# Patient Record
Sex: FEMALE | Race: WHITE | Employment: UNEMPLOYED | URBAN - METROPOLITAN AREA
[De-identification: names, ages, dates, MRNs, and addresses within clinical notes are randomized per-mention and may not be internally consistent; named-entity substitution may affect disease eponyms.]

---

## 2017-02-14 ENCOUNTER — HOSPITAL ENCOUNTER (OUTPATIENT)
Dept: OTHER | Age: 77
Discharge: HOME | End: 2017-02-14
Attending: INTERNAL MEDICINE | Admitting: INTERNAL MEDICINE

## 2017-02-14 DIAGNOSIS — I10 ESSENTIAL HYPERTENSION: ICD-10-CM

## 2017-02-14 LAB
A/G RATIO: 1.2 (ref 1.1–2.2)
ALBUMIN SERPL-MCNC: 3.9 G/DL (ref 3.4–5)
ALP BLD-CCNC: 80 U/L (ref 40–129)
ALT SERPL-CCNC: <5 U/L (ref 10–40)
ANION GAP SERPL CALCULATED.3IONS-SCNC: 14 MMOL/L (ref 3–16)
AST SERPL-CCNC: 11 U/L (ref 15–37)
BASOPHILS ABSOLUTE: 0 K/UL (ref 0–0.2)
BASOPHILS RELATIVE PERCENT: 0.9 %
BILIRUB SERPL-MCNC: 0.9 MG/DL (ref 0–1)
BUN BLDV-MCNC: 9 MG/DL (ref 7–20)
CALCIUM SERPL-MCNC: 9.3 MG/DL (ref 8.3–10.6)
CHLORIDE BLD-SCNC: 104 MMOL/L (ref 99–110)
CHOLESTEROL, TOTAL: 199 MG/DL (ref 0–199)
CO2: 26 MMOL/L (ref 21–32)
CREAT SERPL-MCNC: 1.2 MG/DL (ref 0.6–1.2)
EOSINOPHILS ABSOLUTE: 0.2 K/UL (ref 0–0.6)
EOSINOPHILS RELATIVE PERCENT: 3 %
GFR AFRICAN AMERICAN: 53
GFR NON-AFRICAN AMERICAN: 44
GLOBULIN: 3.2 G/DL
GLUCOSE BLD-MCNC: 106 MG/DL (ref 70–99)
HCT VFR BLD CALC: 34.1 % (ref 36–48)
HDLC SERPL-MCNC: 68 MG/DL (ref 40–60)
HEMOGLOBIN: 11.3 G/DL (ref 12–16)
LDL CHOLESTEROL CALCULATED: 114 MG/DL
LYMPHOCYTES ABSOLUTE: 2.1 K/UL (ref 1–5.1)
LYMPHOCYTES RELATIVE PERCENT: 38 %
MCH RBC QN AUTO: 27.8 PG (ref 26–34)
MCHC RBC AUTO-ENTMCNC: 33.1 G/DL (ref 31–36)
MCV RBC AUTO: 83.8 FL (ref 80–100)
MONOCYTES ABSOLUTE: 0.5 K/UL (ref 0–1.3)
MONOCYTES RELATIVE PERCENT: 8.4 %
NEUTROPHILS ABSOLUTE: 2.8 K/UL (ref 1.7–7.7)
NEUTROPHILS RELATIVE PERCENT: 49.7 %
PDW BLD-RTO: 13.6 % (ref 12.4–15.4)
PLATELET # BLD: 190 K/UL (ref 135–450)
PMV BLD AUTO: 8.4 FL (ref 5–10.5)
POTASSIUM SERPL-SCNC: 4.9 MMOL/L (ref 3.5–5.1)
RBC # BLD: 4.07 M/UL (ref 4–5.2)
SODIUM BLD-SCNC: 144 MMOL/L (ref 136–145)
TOTAL PROTEIN: 7.1 G/DL (ref 6.4–8.2)
TRIGL SERPL-MCNC: 83 MG/DL (ref 0–150)
VLDLC SERPL CALC-MCNC: 17 MG/DL
WBC # BLD: 5.6 K/UL (ref 4–11)

## 2017-05-23 PROBLEM — M17.0 PRIMARY OSTEOARTHRITIS OF BOTH KNEES: Status: ACTIVE | Noted: 2017-05-23

## 2018-06-04 RX ORDER — LISINOPRIL 10 MG/1
10 TABLET ORAL DAILY
Qty: 90 TABLET | Refills: 0 | Status: SHIPPED | OUTPATIENT
Start: 2018-06-04 | End: 2018-08-20 | Stop reason: SDUPTHER

## 2018-08-11 ENCOUNTER — HOSPITAL ENCOUNTER (OUTPATIENT)
Dept: OTHER | Age: 78
Discharge: HOME | End: 2018-08-11
Attending: INTERNAL MEDICINE | Admitting: INTERNAL MEDICINE

## 2018-08-11 DIAGNOSIS — Z00.00 MEDICARE ANNUAL WELLNESS VISIT, SUBSEQUENT: ICD-10-CM

## 2018-08-11 LAB
ANION GAP SERPL CALCULATED.3IONS-SCNC: 12 MMOL/L (ref 3–16)
BUN BLDV-MCNC: 8 MG/DL (ref 7–20)
CALCIUM SERPL-MCNC: 9.5 MG/DL (ref 8.3–10.6)
CHLORIDE BLD-SCNC: 101 MMOL/L (ref 99–110)
CO2: 27 MMOL/L (ref 21–32)
CREAT SERPL-MCNC: 1 MG/DL (ref 0.6–1.2)
GFR AFRICAN AMERICAN: >60
GFR NON-AFRICAN AMERICAN: 54
GLUCOSE BLD-MCNC: 106 MG/DL (ref 70–99)
HCT VFR BLD CALC: 33.9 % (ref 36–48)
HEMOGLOBIN: 11.3 G/DL (ref 12–16)
MCH RBC QN AUTO: 28.1 PG (ref 26–34)
MCHC RBC AUTO-ENTMCNC: 33.5 G/DL (ref 31–36)
MCV RBC AUTO: 83.9 FL (ref 80–100)
PDW BLD-RTO: 14.6 % (ref 12.4–15.4)
PLATELET # BLD: 191 K/UL (ref 135–450)
PMV BLD AUTO: 8 FL (ref 5–10.5)
POTASSIUM SERPL-SCNC: 4.1 MMOL/L (ref 3.5–5.1)
RBC # BLD: 4.04 M/UL (ref 4–5.2)
SODIUM BLD-SCNC: 140 MMOL/L (ref 136–145)
WBC # BLD: 3.9 K/UL (ref 4–11)

## 2019-03-16 ENCOUNTER — HOSPITAL ENCOUNTER (INPATIENT)
Age: 79
LOS: 4 days | Discharge: HOME | DRG: 638 | End: 2019-03-20
Attending: INTERNAL MEDICINE | Admitting: INTERNAL MEDICINE
Payer: MEDICARE

## 2019-03-16 ENCOUNTER — HOSPITAL ENCOUNTER (OUTPATIENT)
Age: 79
Discharge: HOME | DRG: 638 | End: 2019-03-16
Payer: MEDICARE

## 2019-03-16 DIAGNOSIS — E86.0 DEHYDRATION: ICD-10-CM

## 2019-03-16 DIAGNOSIS — E10.65 UNCONTROLLED TYPE 1 DIABETES MELLITUS WITH HYPERGLYCEMIA (HCC): Primary | ICD-10-CM

## 2019-03-16 DIAGNOSIS — M62.838 MUSCLE SPASM: ICD-10-CM

## 2019-03-16 PROBLEM — N17.9 ACUTE RENAL FAILURE (ARF) (HCC): Status: ACTIVE | Noted: 2019-03-16

## 2019-03-16 LAB
A/G RATIO: 1.2 (ref 1.1–2.2)
ALBUMIN SERPL-MCNC: 4.5 G/DL (ref 3.4–5)
ALP BLD-CCNC: 113 U/L (ref 40–129)
ALT SERPL-CCNC: 10 U/L (ref 10–40)
ANION GAP SERPL CALCULATED.3IONS-SCNC: 16 MMOL/L (ref 3–16)
ANION GAP SERPL CALCULATED.3IONS-SCNC: 27 MMOL/L (ref 3–16)
ANION GAP SERPL CALCULATED.3IONS-SCNC: 29 MMOL/L (ref 3–16)
AST SERPL-CCNC: 14 U/L (ref 15–37)
BASOPHILS ABSOLUTE: 0 K/UL (ref 0–0.2)
BASOPHILS RELATIVE PERCENT: 0.4 %
BILIRUB SERPL-MCNC: 1.1 MG/DL (ref 0–1)
BUN BLDV-MCNC: 23 MG/DL (ref 7–20)
BUN BLDV-MCNC: 28 MG/DL (ref 7–20)
BUN BLDV-MCNC: 29 MG/DL (ref 7–20)
CALCIUM SERPL-MCNC: 10.4 MG/DL (ref 8.3–10.6)
CALCIUM SERPL-MCNC: 8.2 MG/DL (ref 8.3–10.6)
CALCIUM SERPL-MCNC: 9.7 MG/DL (ref 8.3–10.6)
CHLORIDE BLD-SCNC: 100 MMOL/L (ref 99–110)
CHLORIDE BLD-SCNC: 80 MMOL/L (ref 99–110)
CHLORIDE BLD-SCNC: 80 MMOL/L (ref 99–110)
CO2: 17 MMOL/L (ref 21–32)
CO2: 18 MMOL/L (ref 21–32)
CO2: 21 MMOL/L (ref 21–32)
CREAT SERPL-MCNC: 1.2 MG/DL (ref 0.6–1.2)
CREAT SERPL-MCNC: 1.5 MG/DL (ref 0.6–1.2)
CREAT SERPL-MCNC: 1.8 MG/DL (ref 0.6–1.2)
EOSINOPHILS ABSOLUTE: 0 K/UL (ref 0–0.6)
EOSINOPHILS RELATIVE PERCENT: 0.4 %
GFR AFRICAN AMERICAN: 33
GFR AFRICAN AMERICAN: 40
GFR AFRICAN AMERICAN: 52
GFR NON-AFRICAN AMERICAN: 27
GFR NON-AFRICAN AMERICAN: 33
GFR NON-AFRICAN AMERICAN: 43
GLOBULIN: 3.7 G/DL
GLUCOSE BLD-MCNC: 151 MG/DL (ref 70–99)
GLUCOSE BLD-MCNC: 162 MG/DL (ref 70–99)
GLUCOSE BLD-MCNC: 182 MG/DL (ref 70–99)
GLUCOSE BLD-MCNC: 312 MG/DL (ref 70–99)
GLUCOSE BLD-MCNC: 373 MG/DL (ref 70–99)
GLUCOSE BLD-MCNC: 665 MG/DL (ref 70–99)
GLUCOSE BLD-MCNC: 719 MG/DL (ref 70–99)
GLUCOSE BLD-MCNC: >600 MG/DL (ref 70–99)
HCT VFR BLD CALC: 41.9 % (ref 36–48)
HCT VFR BLD CALC: 41.9 % (ref 36–48)
HEMOGLOBIN: 13.7 G/DL (ref 12–16)
HEMOGLOBIN: 13.7 G/DL (ref 12–16)
LYMPHOCYTES ABSOLUTE: 1.7 K/UL (ref 1–5.1)
LYMPHOCYTES RELATIVE PERCENT: 29.6 %
MAGNESIUM: 2.1 MG/DL (ref 1.8–2.4)
MAGNESIUM: 2.3 MG/DL (ref 1.8–2.4)
MAGNESIUM: 2.5 MG/DL (ref 1.8–2.4)
MCH RBC QN AUTO: 28 PG (ref 26–34)
MCH RBC QN AUTO: 28.1 PG (ref 26–34)
MCHC RBC AUTO-ENTMCNC: 32.6 G/DL (ref 31–36)
MCHC RBC AUTO-ENTMCNC: 32.8 G/DL (ref 31–36)
MCV RBC AUTO: 85.6 FL (ref 80–100)
MCV RBC AUTO: 85.8 FL (ref 80–100)
MONOCYTES ABSOLUTE: 0.6 K/UL (ref 0–1.3)
MONOCYTES RELATIVE PERCENT: 10.2 %
NEUTROPHILS ABSOLUTE: 3.5 K/UL (ref 1.7–7.7)
NEUTROPHILS RELATIVE PERCENT: 59.4 %
PDW BLD-RTO: 15.1 % (ref 12.4–15.4)
PDW BLD-RTO: 15.4 % (ref 12.4–15.4)
PERFORMED ON: ABNORMAL
PHOSPHORUS: 1.8 MG/DL (ref 2.5–4.9)
PHOSPHORUS: 3.9 MG/DL (ref 2.5–4.9)
PLATELET # BLD: 205 K/UL (ref 135–450)
PLATELET # BLD: 218 K/UL (ref 135–450)
PMV BLD AUTO: 10.5 FL (ref 5–10.5)
PMV BLD AUTO: 11 FL (ref 5–10.5)
POTASSIUM REFLEX MAGNESIUM: 5.9 MMOL/L (ref 3.5–5.1)
POTASSIUM SERPL-SCNC: 4.4 MMOL/L (ref 3.5–5.1)
POTASSIUM SERPL-SCNC: 5.6 MMOL/L (ref 3.5–5.1)
RBC # BLD: 4.88 M/UL (ref 4–5.2)
RBC # BLD: 4.9 M/UL (ref 4–5.2)
SODIUM BLD-SCNC: 125 MMOL/L (ref 136–145)
SODIUM BLD-SCNC: 126 MMOL/L (ref 136–145)
SODIUM BLD-SCNC: 137 MMOL/L (ref 136–145)
TOTAL PROTEIN: 8.2 G/DL (ref 6.4–8.2)
WBC # BLD: 5.9 K/UL (ref 4–11)
WBC # BLD: 6.4 K/UL (ref 4–11)

## 2019-03-16 PROCEDURE — 6360000002 HC RX W HCPCS: Performed by: FAMILY MEDICINE

## 2019-03-16 PROCEDURE — 2580000003 HC RX 258: Performed by: FAMILY MEDICINE

## 2019-03-16 PROCEDURE — 36415 COLL VENOUS BLD VENIPUNCTURE: CPT

## 2019-03-16 PROCEDURE — 80053 COMPREHEN METABOLIC PANEL: CPT

## 2019-03-16 PROCEDURE — 83036 HEMOGLOBIN GLYCOSYLATED A1C: CPT

## 2019-03-16 PROCEDURE — 2500000003 HC RX 250 WO HCPCS: Performed by: FAMILY MEDICINE

## 2019-03-16 PROCEDURE — 83735 ASSAY OF MAGNESIUM: CPT

## 2019-03-16 PROCEDURE — 84100 ASSAY OF PHOSPHORUS: CPT

## 2019-03-16 PROCEDURE — 85025 COMPLETE CBC W/AUTO DIFF WBC: CPT

## 2019-03-16 PROCEDURE — 6370000000 HC RX 637 (ALT 250 FOR IP): Performed by: INTERNAL MEDICINE

## 2019-03-16 PROCEDURE — 2580000003 HC RX 258: Performed by: INTERNAL MEDICINE

## 2019-03-16 PROCEDURE — 6370000000 HC RX 637 (ALT 250 FOR IP): Performed by: FAMILY MEDICINE

## 2019-03-16 PROCEDURE — 2000000000 HC ICU R&B

## 2019-03-16 PROCEDURE — 2580000003 HC RX 258

## 2019-03-16 PROCEDURE — 85027 COMPLETE CBC AUTOMATED: CPT

## 2019-03-16 RX ORDER — SODIUM CHLORIDE 9 MG/ML
INJECTION, SOLUTION INTRAVENOUS CONTINUOUS
Status: DISCONTINUED | OUTPATIENT
Start: 2019-03-16 | End: 2019-03-18

## 2019-03-16 RX ORDER — DEXTROSE AND SODIUM CHLORIDE 5; .45 G/100ML; G/100ML
INJECTION, SOLUTION INTRAVENOUS CONTINUOUS PRN
Status: DISCONTINUED | OUTPATIENT
Start: 2019-03-16 | End: 2019-03-19

## 2019-03-16 RX ORDER — 0.9 % SODIUM CHLORIDE 0.9 %
15 INTRAVENOUS SOLUTION INTRAVENOUS ONCE
Status: COMPLETED | OUTPATIENT
Start: 2019-03-16 | End: 2019-03-16

## 2019-03-16 RX ORDER — MAGNESIUM SULFATE 1 G/100ML
1 INJECTION INTRAVENOUS PRN
Status: DISCONTINUED | OUTPATIENT
Start: 2019-03-16 | End: 2019-03-18

## 2019-03-16 RX ORDER — 0.9 % SODIUM CHLORIDE 0.9 %
2000 INTRAVENOUS SOLUTION INTRAVENOUS ONCE
Status: COMPLETED | OUTPATIENT
Start: 2019-03-16 | End: 2019-03-16

## 2019-03-16 RX ORDER — SODIUM CHLORIDE 9 MG/ML
INJECTION, SOLUTION INTRAVENOUS
Status: COMPLETED
Start: 2019-03-16 | End: 2019-03-16

## 2019-03-16 RX ORDER — POTASSIUM CHLORIDE 7.45 MG/ML
10 INJECTION INTRAVENOUS PRN
Status: DISCONTINUED | OUTPATIENT
Start: 2019-03-16 | End: 2019-03-18

## 2019-03-16 RX ORDER — DEXTROSE MONOHYDRATE 25 G/50ML
12.5 INJECTION, SOLUTION INTRAVENOUS PRN
Status: DISCONTINUED | OUTPATIENT
Start: 2019-03-16 | End: 2019-03-19 | Stop reason: SDUPTHER

## 2019-03-16 RX ADMIN — SODIUM CHLORIDE 614 ML: 9 INJECTION, SOLUTION INTRAVENOUS at 19:57

## 2019-03-16 RX ADMIN — INSULIN HUMAN 10 UNITS: 100 INJECTION, SOLUTION PARENTERAL at 18:56

## 2019-03-16 RX ADMIN — ENOXAPARIN SODIUM 30 MG: 30 INJECTION SUBCUTANEOUS at 22:32

## 2019-03-16 RX ADMIN — SODIUM PHOSPHATE, MONOBASIC, MONOHYDRATE 15 MMOL: 276; 142 INJECTION, SOLUTION INTRAVENOUS at 23:34

## 2019-03-16 RX ADMIN — SODIUM CHLORIDE: 9 INJECTION, SOLUTION INTRAVENOUS at 21:03

## 2019-03-16 RX ADMIN — SODIUM CHLORIDE 2000 ML: 9 INJECTION, SOLUTION INTRAVENOUS at 18:58

## 2019-03-16 RX ADMIN — DEXTROSE AND SODIUM CHLORIDE: 5; 450 INJECTION, SOLUTION INTRAVENOUS at 22:44

## 2019-03-16 RX ADMIN — POTASSIUM CHLORIDE 10 MEQ: 7.46 INJECTION, SOLUTION INTRAVENOUS at 23:27

## 2019-03-16 RX ADMIN — SODIUM CHLORIDE 1000 ML: 9 INJECTION, SOLUTION INTRAVENOUS at 18:57

## 2019-03-16 RX ADMIN — SODIUM CHLORIDE 0.1 UNITS/KG/HR: 9 INJECTION, SOLUTION INTRAVENOUS at 19:51

## 2019-03-17 LAB
ANION GAP SERPL CALCULATED.3IONS-SCNC: 10 MMOL/L (ref 3–16)
ANION GAP SERPL CALCULATED.3IONS-SCNC: 12 MMOL/L (ref 3–16)
ANION GAP SERPL CALCULATED.3IONS-SCNC: 9 MMOL/L (ref 3–16)
BILIRUBIN URINE: NEGATIVE
BLOOD, URINE: ABNORMAL
BUN BLDV-MCNC: 12 MG/DL (ref 7–20)
BUN BLDV-MCNC: 15 MG/DL (ref 7–20)
BUN BLDV-MCNC: 19 MG/DL (ref 7–20)
CALCIUM SERPL-MCNC: 7.6 MG/DL (ref 8.3–10.6)
CALCIUM SERPL-MCNC: 7.7 MG/DL (ref 8.3–10.6)
CALCIUM SERPL-MCNC: 7.8 MG/DL (ref 8.3–10.6)
CHLORIDE BLD-SCNC: 101 MMOL/L (ref 99–110)
CHLORIDE BLD-SCNC: 102 MMOL/L (ref 99–110)
CHLORIDE BLD-SCNC: 102 MMOL/L (ref 99–110)
CLARITY: ABNORMAL
CO2: 22 MMOL/L (ref 21–32)
CO2: 22 MMOL/L (ref 21–32)
CO2: 24 MMOL/L (ref 21–32)
COLOR: YELLOW
CREAT SERPL-MCNC: 0.9 MG/DL (ref 0.6–1.2)
CREAT SERPL-MCNC: 0.9 MG/DL (ref 0.6–1.2)
CREAT SERPL-MCNC: 1 MG/DL (ref 0.6–1.2)
EPITHELIAL CELLS, UA: 1 /HPF (ref 0–5)
GFR AFRICAN AMERICAN: >60
GFR NON-AFRICAN AMERICAN: 53
GFR NON-AFRICAN AMERICAN: >60
GFR NON-AFRICAN AMERICAN: >60
GLUCOSE BLD-MCNC: 135 MG/DL (ref 70–99)
GLUCOSE BLD-MCNC: 137 MG/DL (ref 70–99)
GLUCOSE BLD-MCNC: 146 MG/DL (ref 70–99)
GLUCOSE BLD-MCNC: 148 MG/DL (ref 70–99)
GLUCOSE BLD-MCNC: 159 MG/DL (ref 70–99)
GLUCOSE BLD-MCNC: 165 MG/DL (ref 70–99)
GLUCOSE BLD-MCNC: 185 MG/DL (ref 70–99)
GLUCOSE BLD-MCNC: 188 MG/DL (ref 70–99)
GLUCOSE BLD-MCNC: 189 MG/DL (ref 70–99)
GLUCOSE BLD-MCNC: 195 MG/DL (ref 70–99)
GLUCOSE BLD-MCNC: 195 MG/DL (ref 70–99)
GLUCOSE BLD-MCNC: 204 MG/DL (ref 70–99)
GLUCOSE BLD-MCNC: 230 MG/DL (ref 70–99)
GLUCOSE BLD-MCNC: 236 MG/DL (ref 70–99)
GLUCOSE BLD-MCNC: 237 MG/DL (ref 70–99)
GLUCOSE URINE: >=1000 MG/DL
HYALINE CASTS: 2 /LPF (ref 0–8)
KETONES, URINE: 40 MG/DL
LEUKOCYTE ESTERASE, URINE: ABNORMAL
MAGNESIUM: 1.7 MG/DL (ref 1.8–2.4)
MAGNESIUM: 1.8 MG/DL (ref 1.8–2.4)
MAGNESIUM: 3 MG/DL (ref 1.8–2.4)
MICROSCOPIC EXAMINATION: YES
NITRITE, URINE: NEGATIVE
PERFORMED ON: ABNORMAL
PH UA: 5.5 (ref 5–8)
PHOSPHORUS: 1.7 MG/DL (ref 2.5–4.9)
PHOSPHORUS: 2.4 MG/DL (ref 2.5–4.9)
PHOSPHORUS: 3.5 MG/DL (ref 2.5–4.9)
POTASSIUM SERPL-SCNC: 3.7 MMOL/L (ref 3.5–5.1)
POTASSIUM SERPL-SCNC: 4.1 MMOL/L (ref 3.5–5.1)
POTASSIUM SERPL-SCNC: 4.2 MMOL/L (ref 3.5–5.1)
PROTEIN UA: NEGATIVE MG/DL
RBC UA: 2 /HPF (ref 0–4)
SODIUM BLD-SCNC: 133 MMOL/L (ref 136–145)
SODIUM BLD-SCNC: 135 MMOL/L (ref 136–145)
SODIUM BLD-SCNC: 136 MMOL/L (ref 136–145)
SPECIFIC GRAVITY UA: 1.02 (ref 1–1.03)
URINE TYPE: ABNORMAL
UROBILINOGEN, URINE: 1 E.U./DL
WBC UA: 15 /HPF (ref 0–5)

## 2019-03-17 PROCEDURE — 36415 COLL VENOUS BLD VENIPUNCTURE: CPT

## 2019-03-17 PROCEDURE — 81001 URINALYSIS AUTO W/SCOPE: CPT

## 2019-03-17 PROCEDURE — 2000000000 HC ICU R&B

## 2019-03-17 PROCEDURE — 80048 BASIC METABOLIC PNL TOTAL CA: CPT

## 2019-03-17 PROCEDURE — 2580000003 HC RX 258: Performed by: FAMILY MEDICINE

## 2019-03-17 PROCEDURE — 6360000002 HC RX W HCPCS: Performed by: INTERNAL MEDICINE

## 2019-03-17 PROCEDURE — 6370000000 HC RX 637 (ALT 250 FOR IP): Performed by: INTERNAL MEDICINE

## 2019-03-17 PROCEDURE — 1200000000 HC SEMI PRIVATE

## 2019-03-17 PROCEDURE — 84100 ASSAY OF PHOSPHORUS: CPT

## 2019-03-17 PROCEDURE — 83036 HEMOGLOBIN GLYCOSYLATED A1C: CPT

## 2019-03-17 PROCEDURE — 83735 ASSAY OF MAGNESIUM: CPT

## 2019-03-17 PROCEDURE — 6360000002 HC RX W HCPCS: Performed by: FAMILY MEDICINE

## 2019-03-17 RX ADMIN — POTASSIUM CHLORIDE 10 MEQ: 7.46 INJECTION, SOLUTION INTRAVENOUS at 04:20

## 2019-03-17 RX ADMIN — POTASSIUM CHLORIDE 10 MEQ: 7.46 INJECTION, SOLUTION INTRAVENOUS at 00:45

## 2019-03-17 RX ADMIN — POTASSIUM CHLORIDE 10 MEQ: 7.46 INJECTION, SOLUTION INTRAVENOUS at 09:55

## 2019-03-17 RX ADMIN — INSULIN LISPRO 1 UNITS: 100 INJECTION, SOLUTION INTRAVENOUS; SUBCUTANEOUS at 21:15

## 2019-03-17 RX ADMIN — ENOXAPARIN SODIUM 40 MG: 40 INJECTION SUBCUTANEOUS at 21:15

## 2019-03-17 RX ADMIN — INSULIN GLARGINE 10 UNITS: 100 INJECTION, SOLUTION SUBCUTANEOUS at 09:56

## 2019-03-17 RX ADMIN — DEXTROSE AND SODIUM CHLORIDE: 5; 450 INJECTION, SOLUTION INTRAVENOUS at 05:29

## 2019-03-17 RX ADMIN — POTASSIUM CHLORIDE 10 MEQ: 7.46 INJECTION, SOLUTION INTRAVENOUS at 06:16

## 2019-03-17 RX ADMIN — INSULIN LISPRO 2 UNITS: 100 INJECTION, SOLUTION INTRAVENOUS; SUBCUTANEOUS at 14:36

## 2019-03-18 ENCOUNTER — APPOINTMENT (OUTPATIENT)
Dept: CT IMAGING | Age: 79
DRG: 638 | End: 2019-03-18
Attending: INTERNAL MEDICINE
Payer: MEDICARE

## 2019-03-18 LAB
ANION GAP SERPL CALCULATED.3IONS-SCNC: 8 MMOL/L (ref 3–16)
BUN BLDV-MCNC: 9 MG/DL (ref 7–20)
CALCIUM SERPL-MCNC: 8.2 MG/DL (ref 8.3–10.6)
CHLORIDE BLD-SCNC: 106 MMOL/L (ref 99–110)
CO2: 24 MMOL/L (ref 21–32)
CREAT SERPL-MCNC: 0.8 MG/DL (ref 0.6–1.2)
ESTIMATED AVERAGE GLUCOSE: 378.1 MG/DL
ESTIMATED AVERAGE GLUCOSE: 380.9 MG/DL
GFR AFRICAN AMERICAN: >60
GFR NON-AFRICAN AMERICAN: >60
GLUCOSE BLD-MCNC: 118 MG/DL (ref 70–99)
GLUCOSE BLD-MCNC: 126 MG/DL (ref 70–99)
GLUCOSE BLD-MCNC: 135 MG/DL (ref 70–99)
GLUCOSE BLD-MCNC: 144 MG/DL (ref 70–99)
GLUCOSE BLD-MCNC: 145 MG/DL (ref 70–99)
HBA1C MFR BLD: 14.8 %
HBA1C MFR BLD: 14.9 %
MAGNESIUM: 1.9 MG/DL (ref 1.8–2.4)
PERFORMED ON: ABNORMAL
PHOSPHORUS: 2 MG/DL (ref 2.5–4.9)
POTASSIUM SERPL-SCNC: 4.1 MMOL/L (ref 3.5–5.1)
SODIUM BLD-SCNC: 138 MMOL/L (ref 136–145)

## 2019-03-18 PROCEDURE — 92526 ORAL FUNCTION THERAPY: CPT

## 2019-03-18 PROCEDURE — 1200000000 HC SEMI PRIVATE

## 2019-03-18 PROCEDURE — 36415 COLL VENOUS BLD VENIPUNCTURE: CPT

## 2019-03-18 PROCEDURE — 84100 ASSAY OF PHOSPHORUS: CPT

## 2019-03-18 PROCEDURE — 74176 CT ABD & PELVIS W/O CONTRAST: CPT

## 2019-03-18 PROCEDURE — 6370000000 HC RX 637 (ALT 250 FOR IP): Performed by: INTERNAL MEDICINE

## 2019-03-18 PROCEDURE — 92610 EVALUATE SWALLOWING FUNCTION: CPT

## 2019-03-18 PROCEDURE — 83735 ASSAY OF MAGNESIUM: CPT

## 2019-03-18 PROCEDURE — 80048 BASIC METABOLIC PNL TOTAL CA: CPT

## 2019-03-18 RX ORDER — LISINOPRIL 5 MG/1
5 TABLET ORAL DAILY
Status: DISCONTINUED | OUTPATIENT
Start: 2019-03-18 | End: 2019-03-20 | Stop reason: HOSPADM

## 2019-03-18 RX ADMIN — INSULIN GLARGINE 10 UNITS: 100 INJECTION, SOLUTION SUBCUTANEOUS at 08:12

## 2019-03-18 RX ADMIN — INSULIN LISPRO 1 UNITS: 100 INJECTION, SOLUTION INTRAVENOUS; SUBCUTANEOUS at 17:03

## 2019-03-18 RX ADMIN — LISINOPRIL 5 MG: 5 TABLET ORAL at 12:01

## 2019-03-18 ASSESSMENT — PAIN SCALES - GENERAL: PAINLEVEL_OUTOF10: 0

## 2019-03-19 PROBLEM — E10.10 DIABETIC KETOACIDOSIS WITHOUT COMA ASSOCIATED WITH TYPE 1 DIABETES MELLITUS (HCC): Status: ACTIVE | Noted: 2019-03-19

## 2019-03-19 PROBLEM — E10.65 UNCONTROLLED TYPE 1 DIABETES MELLITUS WITH HYPERGLYCEMIA (HCC): Status: ACTIVE | Noted: 2019-03-19

## 2019-03-19 LAB
ANION GAP SERPL CALCULATED.3IONS-SCNC: 9 MMOL/L (ref 3–16)
BUN BLDV-MCNC: 9 MG/DL (ref 7–20)
CALCIUM SERPL-MCNC: 8.4 MG/DL (ref 8.3–10.6)
CHLORIDE BLD-SCNC: 106 MMOL/L (ref 99–110)
CO2: 26 MMOL/L (ref 21–32)
CORTISOL TOTAL: 8.5 UG/DL
CREAT SERPL-MCNC: 0.8 MG/DL (ref 0.6–1.2)
FOLLICLE STIMULATING HORMONE: 58.4 MIU/ML
GFR AFRICAN AMERICAN: >60
GFR NON-AFRICAN AMERICAN: >60
GLUCOSE BLD-MCNC: 130 MG/DL (ref 70–99)
GLUCOSE BLD-MCNC: 190 MG/DL (ref 70–99)
GLUCOSE BLD-MCNC: 246 MG/DL (ref 70–99)
GLUCOSE BLD-MCNC: 87 MG/DL (ref 70–99)
GLUCOSE BLD-MCNC: 89 MG/DL (ref 70–99)
LUTEINIZING HORMONE: 29.3 MIU/ML
MAGNESIUM: 1.9 MG/DL (ref 1.8–2.4)
PERFORMED ON: ABNORMAL
PERFORMED ON: NORMAL
PHOSPHORUS: 2.5 MG/DL (ref 2.5–4.9)
POTASSIUM SERPL-SCNC: 3.8 MMOL/L (ref 3.5–5.1)
SODIUM BLD-SCNC: 141 MMOL/L (ref 136–145)
T4 FREE: 1.2 NG/DL (ref 0.9–1.8)
TSH SERPL DL<=0.05 MIU/L-ACNC: 3.32 UIU/ML (ref 0.27–4.2)

## 2019-03-19 PROCEDURE — 83001 ASSAY OF GONADOTROPIN (FSH): CPT

## 2019-03-19 PROCEDURE — 84443 ASSAY THYROID STIM HORMONE: CPT

## 2019-03-19 PROCEDURE — 84100 ASSAY OF PHOSPHORUS: CPT

## 2019-03-19 PROCEDURE — 82533 TOTAL CORTISOL: CPT

## 2019-03-19 PROCEDURE — 1200000000 HC SEMI PRIVATE

## 2019-03-19 PROCEDURE — 83735 ASSAY OF MAGNESIUM: CPT

## 2019-03-19 PROCEDURE — 36415 COLL VENOUS BLD VENIPUNCTURE: CPT

## 2019-03-19 PROCEDURE — 80048 BASIC METABOLIC PNL TOTAL CA: CPT

## 2019-03-19 PROCEDURE — 84305 ASSAY OF SOMATOMEDIN: CPT

## 2019-03-19 PROCEDURE — 6370000000 HC RX 637 (ALT 250 FOR IP): Performed by: INTERNAL MEDICINE

## 2019-03-19 PROCEDURE — 83002 ASSAY OF GONADOTROPIN (LH): CPT

## 2019-03-19 PROCEDURE — 84439 ASSAY OF FREE THYROXINE: CPT

## 2019-03-19 RX ORDER — NICOTINE POLACRILEX 4 MG
15 LOZENGE BUCCAL PRN
Status: DISCONTINUED | OUTPATIENT
Start: 2019-03-19 | End: 2019-03-20 | Stop reason: HOSPADM

## 2019-03-19 RX ORDER — DEXTROSE MONOHYDRATE 50 MG/ML
100 INJECTION, SOLUTION INTRAVENOUS PRN
Status: DISCONTINUED | OUTPATIENT
Start: 2019-03-19 | End: 2019-03-20 | Stop reason: HOSPADM

## 2019-03-19 RX ORDER — DEXTROSE MONOHYDRATE 25 G/50ML
12.5 INJECTION, SOLUTION INTRAVENOUS PRN
Status: DISCONTINUED | OUTPATIENT
Start: 2019-03-19 | End: 2019-03-20 | Stop reason: HOSPADM

## 2019-03-19 RX ADMIN — INSULIN LISPRO 1 UNITS: 100 INJECTION, SOLUTION INTRAVENOUS; SUBCUTANEOUS at 19:30

## 2019-03-19 RX ADMIN — INSULIN LISPRO 1 UNITS: 100 INJECTION, SOLUTION INTRAVENOUS; SUBCUTANEOUS at 22:12

## 2019-03-19 RX ADMIN — INSULIN GLARGINE 10 UNITS: 100 INJECTION, SOLUTION SUBCUTANEOUS at 10:05

## 2019-03-19 RX ADMIN — LISINOPRIL 5 MG: 5 TABLET ORAL at 09:52

## 2019-03-19 ASSESSMENT — PAIN SCALES - GENERAL
PAINLEVEL_OUTOF10: 0

## 2019-03-20 VITALS
HEIGHT: 65 IN | WEIGHT: 95.02 LBS | OXYGEN SATURATION: 100 % | TEMPERATURE: 97.8 F | HEART RATE: 70 BPM | RESPIRATION RATE: 16 BRPM | DIASTOLIC BLOOD PRESSURE: 78 MMHG | SYSTOLIC BLOOD PRESSURE: 138 MMHG | BODY MASS INDEX: 15.83 KG/M2

## 2019-03-20 LAB
ANION GAP SERPL CALCULATED.3IONS-SCNC: 9 MMOL/L (ref 3–16)
BUN BLDV-MCNC: 9 MG/DL (ref 7–20)
CALCIUM SERPL-MCNC: 8.6 MG/DL (ref 8.3–10.6)
CHLORIDE BLD-SCNC: 106 MMOL/L (ref 99–110)
CO2: 27 MMOL/L (ref 21–32)
CREAT SERPL-MCNC: 0.9 MG/DL (ref 0.6–1.2)
GFR AFRICAN AMERICAN: >60
GFR NON-AFRICAN AMERICAN: >60
GLUCOSE BLD-MCNC: 119 MG/DL (ref 70–99)
GLUCOSE BLD-MCNC: 166 MG/DL (ref 70–99)
GLUCOSE BLD-MCNC: 238 MG/DL (ref 70–99)
GLUCOSE BLD-MCNC: 71 MG/DL (ref 70–99)
IGF-1 (INSULIN-LIKE GROWTH I): 33 NG/ML (ref 18–206)
INSULIN-LIKE GROWTH FACTOR-1 Z-SCORE: -2
MAGNESIUM: 1.8 MG/DL (ref 1.8–2.4)
PERFORMED ON: ABNORMAL
PERFORMED ON: ABNORMAL
PERFORMED ON: NORMAL
PHOSPHORUS: 3.2 MG/DL (ref 2.5–4.9)
POTASSIUM SERPL-SCNC: 3.7 MMOL/L (ref 3.5–5.1)
SODIUM BLD-SCNC: 142 MMOL/L (ref 136–145)

## 2019-03-20 PROCEDURE — 84100 ASSAY OF PHOSPHORUS: CPT

## 2019-03-20 PROCEDURE — 6370000000 HC RX 637 (ALT 250 FOR IP): Performed by: INTERNAL MEDICINE

## 2019-03-20 PROCEDURE — 80048 BASIC METABOLIC PNL TOTAL CA: CPT

## 2019-03-20 PROCEDURE — 36415 COLL VENOUS BLD VENIPUNCTURE: CPT

## 2019-03-20 PROCEDURE — 83735 ASSAY OF MAGNESIUM: CPT

## 2019-03-20 RX ORDER — LANCETS 30 GAUGE
1 EACH MISCELLANEOUS 4 TIMES DAILY
Qty: 200 EACH | Refills: 0 | Status: SHIPPED | OUTPATIENT
Start: 2019-03-20 | End: 2020-06-23 | Stop reason: SDUPTHER

## 2019-03-20 RX ORDER — MAGNESIUM OXIDE 400 MG/1
400 TABLET ORAL DAILY
Qty: 30 TABLET | Refills: 1 | Status: SHIPPED | OUTPATIENT
Start: 2019-03-20 | End: 2019-04-19

## 2019-03-20 RX ORDER — LISINOPRIL 5 MG/1
5 TABLET ORAL DAILY
Qty: 30 TABLET | Refills: 3 | Status: SHIPPED | OUTPATIENT
Start: 2019-03-21 | End: 2019-05-23 | Stop reason: SDUPTHER

## 2019-03-20 RX ADMIN — INSULIN LISPRO 1 UNITS: 100 INJECTION, SOLUTION INTRAVENOUS; SUBCUTANEOUS at 08:48

## 2019-03-20 RX ADMIN — LISINOPRIL 5 MG: 5 TABLET ORAL at 08:45

## 2019-03-20 RX ADMIN — INSULIN LISPRO 2 UNITS: 100 INJECTION, SOLUTION INTRAVENOUS; SUBCUTANEOUS at 11:52

## 2019-03-20 RX ADMIN — INSULIN GLARGINE 10 UNITS: 100 INJECTION, SOLUTION SUBCUTANEOUS at 08:47

## 2019-03-20 ASSESSMENT — PAIN SCALES - GENERAL
PAINLEVEL_OUTOF10: 0

## 2019-09-17 ENCOUNTER — HOSPITAL ENCOUNTER (OUTPATIENT)
Age: 79
Discharge: HOME | End: 2019-09-17
Payer: MEDICARE

## 2019-09-17 LAB
CREATININE URINE: 81.5 MG/DL (ref 28–259)
MICROALBUMIN UR-MCNC: <1.2 MG/DL
MICROALBUMIN/CREAT UR-RTO: NORMAL MG/G (ref 0–30)

## 2019-09-17 PROCEDURE — 82570 ASSAY OF URINE CREATININE: CPT

## 2019-09-17 PROCEDURE — 82043 UR ALBUMIN QUANTITATIVE: CPT

## 2019-10-07 ENCOUNTER — HOSPITAL ENCOUNTER (OUTPATIENT)
Age: 79
Discharge: HOME | End: 2019-10-07
Payer: MEDICARE

## 2019-10-07 ENCOUNTER — OFFICE VISIT (OUTPATIENT)
Dept: ENDOCRINOLOGY | Age: 79
End: 2019-10-07
Payer: MEDICARE

## 2019-10-07 VITALS
WEIGHT: 88.4 LBS | OXYGEN SATURATION: 99 % | SYSTOLIC BLOOD PRESSURE: 138 MMHG | DIASTOLIC BLOOD PRESSURE: 78 MMHG | BODY MASS INDEX: 14.73 KG/M2 | HEART RATE: 61 BPM | HEIGHT: 65 IN

## 2019-10-07 DIAGNOSIS — E10.10 DIABETIC KETOACIDOSIS WITHOUT COMA ASSOCIATED WITH TYPE 1 DIABETES MELLITUS (HCC): Primary | ICD-10-CM

## 2019-10-07 DIAGNOSIS — K59.00 CONSTIPATION, UNSPECIFIED CONSTIPATION TYPE: ICD-10-CM

## 2019-10-07 DIAGNOSIS — E10.65 UNCONTROLLED TYPE 1 DIABETES MELLITUS WITH HYPERGLYCEMIA (HCC): ICD-10-CM

## 2019-10-07 DIAGNOSIS — M17.0 PRIMARY OSTEOARTHRITIS OF BOTH KNEES: ICD-10-CM

## 2019-10-07 DIAGNOSIS — I10 ESSENTIAL HYPERTENSION: ICD-10-CM

## 2019-10-07 DIAGNOSIS — E10.10 DIABETIC KETOACIDOSIS WITHOUT COMA ASSOCIATED WITH TYPE 1 DIABETES MELLITUS (HCC): ICD-10-CM

## 2019-10-07 LAB
A/G RATIO: 1.5 (ref 1.1–2.2)
ALBUMIN SERPL-MCNC: 4.6 G/DL (ref 3.4–5)
ALP BLD-CCNC: 69 U/L (ref 40–129)
ALT SERPL-CCNC: 12 U/L (ref 10–40)
ANION GAP SERPL CALCULATED.3IONS-SCNC: 15 MMOL/L (ref 3–16)
ANTI-THYROGLOB ABS: <10 IU/ML
AST SERPL-CCNC: 23 U/L (ref 15–37)
BILIRUB SERPL-MCNC: 0.6 MG/DL (ref 0–1)
BUN BLDV-MCNC: 19 MG/DL (ref 7–20)
CALCIUM SERPL-MCNC: 10.1 MG/DL (ref 8.3–10.6)
CHLORIDE BLD-SCNC: 104 MMOL/L (ref 99–110)
CO2: 24 MMOL/L (ref 21–32)
CREAT SERPL-MCNC: 0.9 MG/DL (ref 0.6–1.2)
GFR AFRICAN AMERICAN: >60
GFR NON-AFRICAN AMERICAN: >60
GLOBULIN: 3 G/DL
GLUCOSE BLD-MCNC: 107 MG/DL (ref 70–99)
POTASSIUM SERPL-SCNC: 5.6 MMOL/L (ref 3.5–5.1)
SODIUM BLD-SCNC: 143 MMOL/L (ref 136–145)
T3 TOTAL: 0.68 NG/ML (ref 0.8–2)
T4 FREE: 1.2 NG/DL (ref 0.9–1.8)
THYROID PEROXIDASE (TPO) ABS: 7 IU/ML
TOTAL PROTEIN: 7.6 G/DL (ref 6.4–8.2)
TSH SERPL DL<=0.05 MIU/L-ACNC: 2.83 UIU/ML (ref 0.27–4.2)

## 2019-10-07 PROCEDURE — G8484 FLU IMMUNIZE NO ADMIN: HCPCS | Performed by: INTERNAL MEDICINE

## 2019-10-07 PROCEDURE — 86341 ISLET CELL ANTIBODY: CPT

## 2019-10-07 PROCEDURE — 84480 ASSAY TRIIODOTHYRONINE (T3): CPT

## 2019-10-07 PROCEDURE — 4040F PNEUMOC VAC/ADMIN/RCVD: CPT | Performed by: INTERNAL MEDICINE

## 2019-10-07 PROCEDURE — 1036F TOBACCO NON-USER: CPT | Performed by: INTERNAL MEDICINE

## 2019-10-07 PROCEDURE — G8419 CALC BMI OUT NRM PARAM NOF/U: HCPCS | Performed by: INTERNAL MEDICINE

## 2019-10-07 PROCEDURE — G8400 PT W/DXA NO RESULTS DOC: HCPCS | Performed by: INTERNAL MEDICINE

## 2019-10-07 PROCEDURE — 84439 ASSAY OF FREE THYROXINE: CPT

## 2019-10-07 PROCEDURE — 84443 ASSAY THYROID STIM HORMONE: CPT

## 2019-10-07 PROCEDURE — 80053 COMPREHEN METABOLIC PANEL: CPT

## 2019-10-07 PROCEDURE — 1090F PRES/ABSN URINE INCON ASSESS: CPT | Performed by: INTERNAL MEDICINE

## 2019-10-07 PROCEDURE — 84681 ASSAY OF C-PEPTIDE: CPT

## 2019-10-07 PROCEDURE — 36415 COLL VENOUS BLD VENIPUNCTURE: CPT

## 2019-10-07 PROCEDURE — 1123F ACP DISCUSS/DSCN MKR DOCD: CPT | Performed by: INTERNAL MEDICINE

## 2019-10-07 PROCEDURE — G8427 DOCREV CUR MEDS BY ELIG CLIN: HCPCS | Performed by: INTERNAL MEDICINE

## 2019-10-07 PROCEDURE — 99204 OFFICE O/P NEW MOD 45 MIN: CPT | Performed by: INTERNAL MEDICINE

## 2019-10-07 PROCEDURE — 86376 MICROSOMAL ANTIBODY EACH: CPT

## 2019-10-07 PROCEDURE — 86800 THYROGLOBULIN ANTIBODY: CPT

## 2019-10-07 PROCEDURE — 83036 HEMOGLOBIN GLYCOSYLATED A1C: CPT

## 2019-10-08 LAB
ESTIMATED AVERAGE GLUCOSE: 134.1 MG/DL
HBA1C MFR BLD: 6.3 %

## 2019-10-09 LAB — C-PEPTIDE: 1.8 NG/ML (ref 1.1–4.4)

## 2019-10-10 ENCOUNTER — OFFICE VISIT (OUTPATIENT)
Dept: ENDOCRINOLOGY | Age: 79
End: 2019-10-10
Payer: MEDICARE

## 2019-10-10 ENCOUNTER — TELEPHONE (OUTPATIENT)
Dept: ENDOCRINOLOGY | Age: 79
End: 2019-10-10

## 2019-10-10 DIAGNOSIS — E10.65 UNCONTROLLED TYPE 1 DIABETES MELLITUS WITH HYPERGLYCEMIA (HCC): Primary | ICD-10-CM

## 2019-10-10 LAB — GLUTAMIC ACID DECARB AB: >250 IU/ML (ref 0–5)

## 2019-10-10 PROCEDURE — 97802 MEDICAL NUTRITION INDIV IN: CPT | Performed by: DIETITIAN, REGISTERED

## 2019-10-15 ENCOUNTER — OFFICE VISIT (OUTPATIENT)
Dept: ENDOCRINOLOGY | Age: 79
End: 2019-10-15
Payer: MEDICARE

## 2019-10-15 VITALS
DIASTOLIC BLOOD PRESSURE: 80 MMHG | SYSTOLIC BLOOD PRESSURE: 120 MMHG | WEIGHT: 91.2 LBS | HEART RATE: 103 BPM | OXYGEN SATURATION: 97 % | BODY MASS INDEX: 15.2 KG/M2 | HEIGHT: 65 IN

## 2019-10-15 DIAGNOSIS — E10.65 UNCONTROLLED TYPE 1 DIABETES MELLITUS WITH HYPERGLYCEMIA (HCC): Primary | ICD-10-CM

## 2019-10-15 PROCEDURE — G8400 PT W/DXA NO RESULTS DOC: HCPCS | Performed by: INTERNAL MEDICINE

## 2019-10-15 PROCEDURE — 1123F ACP DISCUSS/DSCN MKR DOCD: CPT | Performed by: INTERNAL MEDICINE

## 2019-10-15 PROCEDURE — 95251 CONT GLUC MNTR ANALYSIS I&R: CPT | Performed by: INTERNAL MEDICINE

## 2019-10-15 PROCEDURE — 99214 OFFICE O/P EST MOD 30 MIN: CPT | Performed by: INTERNAL MEDICINE

## 2019-10-15 PROCEDURE — G8484 FLU IMMUNIZE NO ADMIN: HCPCS | Performed by: INTERNAL MEDICINE

## 2019-10-15 PROCEDURE — 1090F PRES/ABSN URINE INCON ASSESS: CPT | Performed by: INTERNAL MEDICINE

## 2019-10-15 PROCEDURE — 4040F PNEUMOC VAC/ADMIN/RCVD: CPT | Performed by: INTERNAL MEDICINE

## 2019-10-15 PROCEDURE — 1036F TOBACCO NON-USER: CPT | Performed by: INTERNAL MEDICINE

## 2019-10-15 PROCEDURE — G8419 CALC BMI OUT NRM PARAM NOF/U: HCPCS | Performed by: INTERNAL MEDICINE

## 2019-10-15 PROCEDURE — G8427 DOCREV CUR MEDS BY ELIG CLIN: HCPCS | Performed by: INTERNAL MEDICINE

## 2019-10-15 PROCEDURE — 95250 CONT GLUC MNTR PHYS/QHP EQP: CPT | Performed by: INTERNAL MEDICINE

## 2019-10-15 RX ORDER — METFORMIN HYDROCHLORIDE 500 MG/5ML
SOLUTION ORAL
Qty: 2 BOTTLE | Refills: 3 | Status: SHIPPED | OUTPATIENT
Start: 2019-10-15 | End: 2020-02-13

## 2019-10-16 ENCOUNTER — TELEPHONE (OUTPATIENT)
Dept: ENDOCRINOLOGY | Age: 79
End: 2019-10-16

## 2019-10-24 ENCOUNTER — OFFICE VISIT (OUTPATIENT)
Dept: ENDOCRINOLOGY | Age: 79
End: 2019-10-24
Payer: MEDICARE

## 2019-10-24 DIAGNOSIS — E10.65 UNCONTROLLED TYPE 1 DIABETES MELLITUS WITH HYPERGLYCEMIA (HCC): ICD-10-CM

## 2019-10-24 PROCEDURE — 97803 MED NUTRITION INDIV SUBSEQ: CPT | Performed by: DIETITIAN, REGISTERED

## 2019-10-25 DIAGNOSIS — E10.65 UNCONTROLLED TYPE 1 DIABETES MELLITUS WITH HYPERGLYCEMIA (HCC): Primary | ICD-10-CM

## 2019-10-25 RX ORDER — GLUCOSAMINE HCL/CHONDROITIN SU 500-400 MG
CAPSULE ORAL
Qty: 120 STRIP | Refills: 1 | Status: SHIPPED | OUTPATIENT
Start: 2019-10-25 | End: 2019-10-28 | Stop reason: SDUPTHER

## 2019-10-28 DIAGNOSIS — E10.65 CONTROLLED TYPE 1 DIABETES MELLITUS WITH HYPERGLYCEMIA (HCC): Primary | ICD-10-CM

## 2019-10-28 RX ORDER — LANCETS 30 GAUGE
1 EACH MISCELLANEOUS 3 TIMES DAILY
Qty: 300 EACH | Refills: 3 | Status: SHIPPED | OUTPATIENT
Start: 2019-10-28 | End: 2019-10-28 | Stop reason: SDUPTHER

## 2019-10-28 RX ORDER — LANCETS 30 GAUGE
1 EACH MISCELLANEOUS 3 TIMES DAILY
Qty: 300 EACH | Refills: 3 | Status: SHIPPED | OUTPATIENT
Start: 2019-10-28 | End: 2020-02-13 | Stop reason: SDUPTHER

## 2019-10-28 RX ORDER — GLUCOSAMINE HCL/CHONDROITIN SU 500-400 MG
CAPSULE ORAL
Qty: 270 STRIP | Refills: 3 | Status: SHIPPED | OUTPATIENT
Start: 2019-10-28 | End: 2019-10-28 | Stop reason: SDUPTHER

## 2019-10-28 RX ORDER — GLUCOSAMINE HCL/CHONDROITIN SU 500-400 MG
CAPSULE ORAL
Qty: 270 STRIP | Refills: 3 | Status: SHIPPED | OUTPATIENT
Start: 2019-10-28 | End: 2020-06-23 | Stop reason: SDUPTHER

## 2019-10-28 RX ORDER — GLUCOSAMINE HCL/CHONDROITIN SU 500-400 MG
1 CAPSULE ORAL 3 TIMES DAILY
Qty: 300 EACH | Refills: 3 | Status: SHIPPED | OUTPATIENT
Start: 2019-10-28 | End: 2019-10-28 | Stop reason: SDUPTHER

## 2019-10-28 RX ORDER — GLUCOSAMINE HCL/CHONDROITIN SU 500-400 MG
1 CAPSULE ORAL 3 TIMES DAILY
Qty: 300 EACH | Refills: 3 | Status: SHIPPED | OUTPATIENT
Start: 2019-10-28 | End: 2021-06-08

## 2019-11-14 ENCOUNTER — TELEPHONE (OUTPATIENT)
Dept: ENDOCRINOLOGY | Age: 79
End: 2019-11-14

## 2019-11-14 ENCOUNTER — OFFICE VISIT (OUTPATIENT)
Dept: ENDOCRINOLOGY | Age: 79
End: 2019-11-14
Payer: MEDICARE

## 2019-11-14 DIAGNOSIS — E10.65 UNCONTROLLED TYPE 1 DIABETES MELLITUS WITH HYPERGLYCEMIA (HCC): Primary | ICD-10-CM

## 2019-11-14 PROCEDURE — 97803 MED NUTRITION INDIV SUBSEQ: CPT | Performed by: DIETITIAN, REGISTERED

## 2019-11-15 ENCOUNTER — HOSPITAL ENCOUNTER (OUTPATIENT)
Age: 79
Discharge: HOME | End: 2019-11-15
Payer: MEDICARE

## 2019-11-15 DIAGNOSIS — E10.65 UNCONTROLLED TYPE 1 DIABETES MELLITUS WITH HYPERGLYCEMIA (HCC): ICD-10-CM

## 2019-11-15 LAB
A/G RATIO: 1.6 (ref 1.1–2.2)
ALBUMIN SERPL-MCNC: 4.4 G/DL (ref 3.4–5)
ALP BLD-CCNC: 64 U/L (ref 40–129)
ALT SERPL-CCNC: 13 U/L (ref 10–40)
ANION GAP SERPL CALCULATED.3IONS-SCNC: 11 MMOL/L (ref 3–16)
AST SERPL-CCNC: 17 U/L (ref 15–37)
BILIRUB SERPL-MCNC: 1.6 MG/DL (ref 0–1)
BUN BLDV-MCNC: 14 MG/DL (ref 7–20)
CALCIUM SERPL-MCNC: 9.4 MG/DL (ref 8.3–10.6)
CHLORIDE BLD-SCNC: 104 MMOL/L (ref 99–110)
CHOLESTEROL, TOTAL: 175 MG/DL (ref 0–199)
CO2: 26 MMOL/L (ref 21–32)
CREAT SERPL-MCNC: 0.8 MG/DL (ref 0.6–1.2)
CREATININE URINE: 53.3 MG/DL (ref 28–259)
GFR AFRICAN AMERICAN: >60
GFR NON-AFRICAN AMERICAN: >60
GLOBULIN: 2.8 G/DL
GLUCOSE BLD-MCNC: 120 MG/DL (ref 70–99)
HCT VFR BLD CALC: 34.3 % (ref 36–48)
HDLC SERPL-MCNC: 87 MG/DL (ref 40–60)
HEMOGLOBIN: 11.5 G/DL (ref 12–16)
LDL CHOLESTEROL CALCULATED: 74 MG/DL
MCH RBC QN AUTO: 28.6 PG (ref 26–34)
MCHC RBC AUTO-ENTMCNC: 33.5 G/DL (ref 31–36)
MCV RBC AUTO: 85.5 FL (ref 80–100)
MICROALBUMIN UR-MCNC: <1.2 MG/DL
MICROALBUMIN/CREAT UR-RTO: NORMAL MG/G (ref 0–30)
PDW BLD-RTO: 14.5 % (ref 12.4–15.4)
PLATELET # BLD: 165 K/UL (ref 135–450)
PMV BLD AUTO: 9.5 FL (ref 5–10.5)
POTASSIUM SERPL-SCNC: 3.9 MMOL/L (ref 3.5–5.1)
RBC # BLD: 4.01 M/UL (ref 4–5.2)
SODIUM BLD-SCNC: 141 MMOL/L (ref 136–145)
TOTAL PROTEIN: 7.2 G/DL (ref 6.4–8.2)
TRIGL SERPL-MCNC: 71 MG/DL (ref 0–150)
VLDLC SERPL CALC-MCNC: 14 MG/DL
WBC # BLD: 3.8 K/UL (ref 4–11)

## 2019-11-15 PROCEDURE — 85027 COMPLETE CBC AUTOMATED: CPT

## 2019-11-15 PROCEDURE — 82043 UR ALBUMIN QUANTITATIVE: CPT

## 2019-11-15 PROCEDURE — 36415 COLL VENOUS BLD VENIPUNCTURE: CPT

## 2019-11-15 PROCEDURE — 82570 ASSAY OF URINE CREATININE: CPT

## 2019-11-15 PROCEDURE — 80061 LIPID PANEL: CPT

## 2019-11-15 PROCEDURE — 80053 COMPREHEN METABOLIC PANEL: CPT

## 2020-01-02 ENCOUNTER — TELEPHONE (OUTPATIENT)
Dept: ENDOCRINOLOGY | Age: 80
End: 2020-01-02

## 2020-02-11 ENCOUNTER — HOSPITAL ENCOUNTER (OUTPATIENT)
Age: 80
Discharge: HOME | End: 2020-02-11
Payer: MEDICARE

## 2020-02-11 LAB
A/G RATIO: 1.5 (ref 1.1–2.2)
ALBUMIN SERPL-MCNC: 4.2 G/DL (ref 3.4–5)
ALP BLD-CCNC: 67 U/L (ref 40–129)
ALT SERPL-CCNC: 20 U/L (ref 10–40)
ANION GAP SERPL CALCULATED.3IONS-SCNC: 13 MMOL/L (ref 3–16)
AST SERPL-CCNC: 22 U/L (ref 15–37)
BILIRUB SERPL-MCNC: 0.7 MG/DL (ref 0–1)
BUN BLDV-MCNC: 16 MG/DL (ref 7–20)
CALCIUM SERPL-MCNC: 9.3 MG/DL (ref 8.3–10.6)
CHLORIDE BLD-SCNC: 105 MMOL/L (ref 99–110)
CHOLESTEROL, TOTAL: 160 MG/DL (ref 0–199)
CO2: 26 MMOL/L (ref 21–32)
CREAT SERPL-MCNC: 0.9 MG/DL (ref 0.6–1.2)
CREATININE URINE: 77.4 MG/DL (ref 28–259)
ESTIMATED AVERAGE GLUCOSE: 151.3 MG/DL
GFR AFRICAN AMERICAN: >60
GFR NON-AFRICAN AMERICAN: >60
GLOBULIN: 2.8 G/DL
GLUCOSE BLD-MCNC: 131 MG/DL (ref 70–99)
HBA1C MFR BLD: 6.9 %
HDLC SERPL-MCNC: 72 MG/DL (ref 40–60)
LDL CHOLESTEROL CALCULATED: 76 MG/DL
MICROALBUMIN UR-MCNC: <1.2 MG/DL
MICROALBUMIN/CREAT UR-RTO: NORMAL MG/G (ref 0–30)
POTASSIUM SERPL-SCNC: 3.9 MMOL/L (ref 3.5–5.1)
SODIUM BLD-SCNC: 144 MMOL/L (ref 136–145)
TOTAL PROTEIN: 7 G/DL (ref 6.4–8.2)
TRIGL SERPL-MCNC: 62 MG/DL (ref 0–150)
TSH SERPL DL<=0.05 MIU/L-ACNC: 2.27 UIU/ML (ref 0.27–4.2)
VLDLC SERPL CALC-MCNC: 12 MG/DL

## 2020-02-11 PROCEDURE — 80053 COMPREHEN METABOLIC PANEL: CPT

## 2020-02-11 PROCEDURE — 83036 HEMOGLOBIN GLYCOSYLATED A1C: CPT

## 2020-02-11 PROCEDURE — 80061 LIPID PANEL: CPT

## 2020-02-11 PROCEDURE — 84443 ASSAY THYROID STIM HORMONE: CPT

## 2020-02-11 PROCEDURE — 82043 UR ALBUMIN QUANTITATIVE: CPT

## 2020-02-11 PROCEDURE — 36415 COLL VENOUS BLD VENIPUNCTURE: CPT

## 2020-02-11 PROCEDURE — 82570 ASSAY OF URINE CREATININE: CPT

## 2020-02-12 NOTE — PROGRESS NOTES
Medical Nutrition Therapy for Diabetes follow up    Mikel Carpio  February 12, 2020      Patient Care Team:  Roshni Meyers MD as PCP - Bharath Hanna MD as PCP - [facility] Empaneled Provider    Reason for visit: Type 1 Diabetes    ASSESSMENT/PLAN:   NUTRITION DIAGNOSIS    Patient says, \"I have not taken any insulin since last time. I do not want to take insulin. \"    #1 Problem: Altered Nutrition-Related Laboratory Values (NC-2.2)  Related to: Endocrine/Diabetes   As Evidenced by: Elevated Plasma glucose and/or HgbA1c levels         #2 Problem: Fluid intake NI-3.1                  Inadequate fluid intake    #3 Problem: Problem: Inconsistent Carbohydrate and Fiber Intake  Related to: Food- and nutrition-related knowledge deficit concerning appropriate amount of carbohydrate and fiber intake  As evidenced by: patient food recall      NUTRITION INTERVENTION  Nutrition Prescription: 45-60 grams per meal with protein and non-starch vegetables                                       15 gram carbohydrate snacks    Diabetes Education/Counseling included:  Reviewed relationship of insulin, carbohydrate and weight gain. Reinforced health benefits and blood sugar control when including grains/starches, fruit and milk/yogurt with protein and non-starch vegetables at meals. Reviewed label reading interpretation. Interventions: Congratulated on increased activity. Encouraged 45-60 grams carbohydrate meals with protein and non-starch vegetables. Advised continued  monitoring prior to meals. Take 1-2 units insulin as prescribed.     NUTRITION MONITORING AND EVALUATION  Target 45-60 grams carbohydrate meals  Continue glucose monitoring and walking frequently       Patient Active Problem List   Diagnosis    Essential hypertension    Constipation    Primary osteoarthritis of both knees    Acute renal failure (ARF) ([address]    Uncontrolled type 1 diabetes mellitus with hyperglycemia ([address]    Diabetic ketoacidosis grams carbohydrate meals    Barriers:   -Pts. Hesitant to use insulin if needed.           Follow Up Plan: 2 months    Referring Provider: Urszula Norwood MD  Time spent with patient: 30 minutes

## 2020-02-13 ENCOUNTER — OFFICE VISIT (OUTPATIENT)
Dept: ENDOCRINOLOGY | Age: 80
End: 2020-02-13
Payer: MEDICARE

## 2020-02-13 VITALS
HEIGHT: 65 IN | OXYGEN SATURATION: 99 % | BODY MASS INDEX: 14.33 KG/M2 | DIASTOLIC BLOOD PRESSURE: 80 MMHG | HEART RATE: 61 BPM | SYSTOLIC BLOOD PRESSURE: 100 MMHG | WEIGHT: 86 LBS

## 2020-02-13 PROCEDURE — 97803 MED NUTRITION INDIV SUBSEQ: CPT | Performed by: DIETITIAN, REGISTERED

## 2020-02-13 PROCEDURE — 99214 OFFICE O/P EST MOD 30 MIN: CPT | Performed by: INTERNAL MEDICINE

## 2020-02-13 RX ORDER — LANCETS 30 GAUGE
EACH MISCELLANEOUS
Qty: 300 EACH | Refills: 3 | Status: SHIPPED | OUTPATIENT
Start: 2020-02-13 | End: 2021-06-08

## 2020-02-13 RX ORDER — INSULIN LISPRO 100 [IU]/ML
INJECTION, SOLUTION INTRAVENOUS; SUBCUTANEOUS
Qty: 1 PEN | Refills: 3 | Status: SHIPPED | OUTPATIENT
Start: 2020-02-13 | End: 2020-05-26 | Stop reason: SDUPTHER

## 2020-02-13 NOTE — PATIENT INSTRUCTIONS
Only use 1-2 units of Humalog if eating more than 60 grams of carbs   If the blood sugar above 200 you can take 1-2 unit of humalog

## 2020-02-13 NOTE — PROGRESS NOTES
Lyssa Jin is a [de-identified] y.o. female is seen to day for  evaluation and management of  ELIDA vandana antibodies positive. Lyssa Jin was diagnosed with Diabetes mellitus in March 2019 when she was admitted to the hospital with polyuria polydipsia and weight loss. .  At the time of diagnosis patient had polyuria polydipsia and weight loss aprox 15 lbs . She was admitted to hospital for DKA in march 2019 and that is when she was diagnosed with diabetes , her aic was 14.9 she was discharged home on basal bolus insulin. Patient has been watching her diet closely and does not eat enough due to the fear that her blood glucose are good to go elevated. At the same time she does tend to eat snacks between meals so that she does not have hypoglycemia. She denies any hypoglycemia. --On review of chart in November 2015 she had blood work done and her blood glucose was 88, in February 2017 she had a fasting glucose of 106, again in August 2018 she was noted to have a blood glucose of 106 on her Chem-7  In March 2019 when she was admitted to the hospital with a diabetic ketoacidosis her glucose was 665, sodium 125 and anion gap of 27 with a creatinine of 1.8. She had an A1c of 14.9     Comorbid conditions: none    Current diabetic medications include: lantus 2 units fasting glucose 93 --100    --- Weight loss according to the  she has been slowly losing weight in the last 5 years approximately since 2014 when she weighed around 130 to 140 pounds --denied any significant change in appetite at that time. On review of chart she had lost 16 to 17 pounds from November 2017 to March 2019  --In August 2013 she weighed 117 pounds as per office note so actual weight loss might be approximately 30 pounds since 2013 but it has been gradually. INTERIM:    Diabetes   She presents for her follow-up diabetic visit. She has type 1 diabetes mellitus. No MedicAlert identification noted.  The initial diagnosis of diabetes was made 7 needed for symptoms of irregular blood glucose. 270 strip 3    Alcohol Swabs 70 % PADS 1 each by Does not apply route 3 times daily 300 each 3    Insulin Pen Needle (MEIJER PEN NEEDLES) 31G X 6 MM MISC 1 each by Does not apply route daily 100 each 3    Lancets MISC 1 each by Does not apply route 4 times daily 200 each 0     No current facility-administered medications for this visit. Allergies   Allergen Reactions    Asa [Aspirin]     Codeine     Penicillins      Family Status   Relation Name Status    Brother  (Not Specified)       Review of Systems  I have reviewed the review of system questionnaire filled by the patient .   Patient was advised to contact PCP for non endocrine signs and symptoms       OBJECTIVE:  /80   Pulse 61   Ht 5' 5\" (1.651 m)   Wt 86 lb (39 kg)   SpO2 99%   BMI 14.31 kg/m²    Wt Readings from Last 3 Encounters:   02/13/20 86 lb (39 kg)   11/26/19 88 lb (39.9 kg)   10/15/19 91 lb 3.2 oz (41.4 kg)       Constitutional: no acute distress, well appearing, well nourished  Psychiatric: oriented to person, place and time, judgement, insight and normal, recent and remote memory and intact and mood, affect are normal  Skin: skin and subcutaneous tissue is normal without mass,   Head and Face: examination of head and face revealed no abnormalities  Eyes: no lid or conjunctival swelling, no erythema or discharge, pupils are normal,   Ears/Nose: external inspection of ears and nose revealed no abnormalities, hearing is grossly normal  Oropharynx/Mouth/Face: lips, tongue and gums are normal with no lesions, the voice quality was normal  Neck: neck is supple and symmetric, with midline trachea and no masses, thyroid is normal    Pulmonary: no increased work of breathing or signs of respiratory distress, lungs are clear to auscultation  Cardiovascular: normal heart rate and rhythm, normal S1 and S2,   Musculoskeletal: normal gait and station,   Neurological: normal coordination, normal general cortical function          Lab Results   Component Value Date    LABA1C 6.9 02/11/2020    LABA1C 6.3 10/07/2019    LABA1C 6.2 08/27/2019         ASSESSMENT/PLAN:  1.  New onset diabetes diagnosed in March 2019 ---ELIDA vandana antibody strongly positive C-peptide of 1.8 in October 2019  aic 14>>6.2>>6.3>>6.9    She is not on insulin and has been restricting her carbs   Once she starts consuming more carbs she might need 1-2 units of humalog and this was discussed in great detail with the patient  She is eating snacks in between her meals denies any hypoglycemia at home  I gave her a new prescription for Humalog  Hypoglycemia protocol reviewed in detail with patient Patient was advised to carry glucose tablets as well as given a prescription for glucagon  Patient checks her fingerstick blood glucose 4-6 times a day. Patient was advised that sending of her fingerstick blood glucose logs is crucial in management of her  diabetes. I will adjust the  doses of diabetic medications  according to sent data. Health Maintenance       Last Eye Exam: advised to have annual dilated eye exam. her last eye exam was in 2020   Last Podiatry Exam:  Last foot exam was 2019she follows with podiatry every 3 months Dr Lovie Habermann: Last LDL level was 68 in feb 2020  patient is not on any medication at the time of the blood work she did not carry the diagnosis of diabetes  Microalbumin/Creatinine Ratio: Normal in feb 2020 . Education: Reviewed ABCs of diabetes management (respective goals in parentheses): A1C (<7), blood pressure (<130/80), and cholesterol (LDL <100). Additional Education: Patient has seen diabetes Educator. 2. Essential hypertension  Well controlled on medication     3. Primary osteoarthritis of both knees  Pt denies any significant hypoglycemia     4. Constipation, unspecified constipation type  Chronic problem     5.  LOW BMI  Patient weight 117 lbs in 2013 but now weighs 88   Gradual weight loss over the years. Today we again discussed that she needs to consume more calories and more carbohydrates and if required she can take 1 to 2 units of Humalog with the meals she seems to be fearful of the idea of taking insulin we tried to alleviate her anxiety around insulin dosing. Encouraged to work on diet and exercise and follow-up with the primary care physician for work-up of weight loss if continues          Reviewed and/or ordered clinical lab results yes   Reviewed and/or ordered radiology tests Yes  Reviewed and/or ordered other diagnostic tests yes   Made a decision to obtain old records yes   Reviewed and summarized old records yes     Elizabeth Anton was counseled regarding symptoms of current diagnosis, course and complications of disease if inadequately treated, side effects of medications, diagnosis, treatment options, and prognosis, risks, benefits, complications, and alternatives of treatment, labs, imaging and other studies and treatment targets and goals. She understands instructions and counseling    I spent  25 + minutes with patient and more than 50 % of this time was spent discussing and providing counseling and coordinating care of patient's multiple health issues      Return in about 3 months (around 5/13/2020). Please note that some or all of this report was generated using voice recognition software. Please notify me in case of any questions about the content of this document, as some errors in transcription may have occurred .

## 2020-02-18 RX ORDER — BLOOD SUGAR DIAGNOSTIC
STRIP MISCELLANEOUS
Qty: 300 EACH | Refills: 5 | Status: SHIPPED | OUTPATIENT
Start: 2020-02-18 | End: 2021-01-11 | Stop reason: SDUPTHER

## 2020-02-18 RX ORDER — LANCETS 33 GAUGE
EACH MISCELLANEOUS
Qty: 300 EACH | Refills: 5 | Status: SHIPPED | OUTPATIENT
Start: 2020-02-18 | End: 2021-01-11 | Stop reason: SDUPTHER

## 2020-02-18 NOTE — TELEPHONE ENCOUNTER
Pt's  called and states rec'd One Touch Meter but needs the One Touch Delica Lancets & One Touch Verio Test Strips sent to [facility] on [address]  Rosita called back would like 90 day supply  # [phone]

## 2020-02-25 PROBLEM — H61.23 BILATERAL IMPACTED CERUMEN: Status: ACTIVE | Noted: 2020-02-25

## 2020-03-19 ENCOUNTER — TELEPHONE (OUTPATIENT)
Dept: ENDOCRINOLOGY | Age: 80
End: 2020-03-19

## 2020-04-09 ENCOUNTER — TELEPHONE (OUTPATIENT)
Dept: ENDOCRINOLOGY | Age: 80
End: 2020-04-09

## 2020-04-30 ENCOUNTER — TELEPHONE (OUTPATIENT)
Dept: ENDOCRINOLOGY | Age: 80
End: 2020-04-30

## 2020-05-14 ENCOUNTER — TELEPHONE (OUTPATIENT)
Dept: ENDOCRINOLOGY | Age: 80
End: 2020-05-14

## 2020-06-08 ENCOUNTER — TELEPHONE (OUTPATIENT)
Dept: ENDOCRINOLOGY | Age: 80
End: 2020-06-08

## 2020-06-08 NOTE — TELEPHONE ENCOUNTER
Please advise patient that I reviewed her log and there is no changes in her regimen for now.   She should let us know if her morning glucose start running above 130 all the time

## 2020-06-08 NOTE — TELEPHONE ENCOUNTER
Sorry but this diabetes log has been in my bag and I just found it.  I think it was from when we moved to the new office and I stuck it in there    Diabetes log from 5/7-5/20/20

## 2020-06-11 ENCOUNTER — TELEPHONE (OUTPATIENT)
Dept: ENDOCRINOLOGY | Age: 80
End: 2020-06-11

## 2020-06-23 ENCOUNTER — OFFICE VISIT (OUTPATIENT)
Dept: ENDOCRINOLOGY | Age: 80
End: 2020-06-23
Payer: MEDICARE

## 2020-06-23 VITALS
HEIGHT: 66 IN | DIASTOLIC BLOOD PRESSURE: 78 MMHG | WEIGHT: 84.2 LBS | BODY MASS INDEX: 13.53 KG/M2 | TEMPERATURE: 97.7 F | SYSTOLIC BLOOD PRESSURE: 146 MMHG | RESPIRATION RATE: 16 BRPM | HEART RATE: 63 BPM

## 2020-06-23 LAB — HBA1C MFR BLD: 6.9 %

## 2020-06-23 PROCEDURE — 99214 OFFICE O/P EST MOD 30 MIN: CPT | Performed by: INTERNAL MEDICINE

## 2020-06-23 PROCEDURE — 83036 HEMOGLOBIN GLYCOSYLATED A1C: CPT | Performed by: INTERNAL MEDICINE

## 2020-06-23 RX ORDER — INSULIN LISPRO 100 [IU]/ML
INJECTION, SOLUTION INTRAVENOUS; SUBCUTANEOUS
Qty: 1 PEN | Refills: 3 | Status: SHIPPED | OUTPATIENT
Start: 2020-06-23 | End: 2020-06-23 | Stop reason: SDUPTHER

## 2020-06-23 RX ORDER — INSULIN LISPRO 100 [IU]/ML
INJECTION, SOLUTION INTRAVENOUS; SUBCUTANEOUS
Qty: 1 PEN | Refills: 3 | Status: SHIPPED | OUTPATIENT
Start: 2020-06-23 | End: 2020-07-08

## 2020-06-23 NOTE — PROGRESS NOTES
300 each 5    Alcohol Swabs 70 % PADS 1 each by Does not apply route 3 times daily 300 each 3    Insulin Pen Needle (MEIJER PEN NEEDLES) 31G X 6 MM MISC 1 each by Does not apply route daily 100 each 3    Lancets MISC Test blood sugar TID dx: e10.65 (Patient not taking: Reported on 6/23/2020) 300 each 3     No current facility-administered medications for this visit. Allergies   Allergen Reactions    Asa [Aspirin]     Codeine     Meloxicam     Penicillins      Family Status   Relation Name Status    Brother  (Not Specified)       Review of Systems  I have reviewed the review of system questionnaire filled by the patient .   Patient was advised to contact PCP for non endocrine signs and symptoms       OBJECTIVE:  BP (!) 146/78   Pulse 63   Temp 97.7 °F (36.5 °C)   Resp 16   Ht 5' 5.5\" (1.664 m)   Wt 84 lb 3.2 oz (38.2 kg)   BMI 13.80 kg/m²    Wt Readings from Last 3 Encounters:   06/23/20 84 lb 3.2 oz (38.2 kg)   05/26/20 86 lb (39 kg)   05/26/20 86 lb (39 kg)       Constitutional: no acute distress, well appearing, well nourished  Psychiatric: oriented to person, place and time, judgement, insight and normal, recent and remote memory and intact and mood, affect are normal  Skin: skin and subcutaneous tissue is normal without mass,   Head and Face: examination of head and face revealed no abnormalities  Eyes: no lid or conjunctival swelling, no erythema or discharge, pupils are normal,   Ears/Nose: external inspection of ears and nose revealed no abnormalities, hearing is grossly normal  Oropharynx/Mouth/Face: lips, tongue and gums are normal with no lesions, the voice quality was normal  Neck: neck is supple and symmetric, with midline trachea and no masses, thyroid is normal    Pulmonary: no increased work of breathing or signs of respiratory distress, lungs are clear to auscultation  Cardiovascular: normal heart rate and rhythm, normal S1 and S2,   Musculoskeletal: normal gait and station,

## 2020-06-24 ENCOUNTER — OFFICE VISIT (OUTPATIENT)
Dept: ENT CLINIC | Age: 80
End: 2020-06-24
Payer: MEDICARE

## 2020-06-24 VITALS
WEIGHT: 84 LBS | HEIGHT: 65 IN | DIASTOLIC BLOOD PRESSURE: 72 MMHG | TEMPERATURE: 98.2 F | HEART RATE: 66 BPM | BODY MASS INDEX: 13.99 KG/M2 | SYSTOLIC BLOOD PRESSURE: 179 MMHG

## 2020-06-24 PROBLEM — H90.0 CONDUCTIVE HEARING LOSS, BILATERAL: Status: ACTIVE | Noted: 2020-06-24

## 2020-06-24 PROCEDURE — 69210 REMOVE IMPACTED EAR WAX UNI: CPT | Performed by: OTOLARYNGOLOGY

## 2020-06-24 NOTE — PATIENT INSTRUCTIONS
1. Please schedule an audiogram (hearing test), to evaluate your hearing loss. 2. You may use an over the counter ear wax removal kit (such as Murine, Bausch and Lomb, NeilMed, or Debrox wax removal system) for ear wax removal, as needed. 3. It may help to use Debrox (OTC) for 4 days prior to future visits for ear cleaning. This may soften your ear wax and facilitate removal of the wax. NO Q-TIPS OR OTHER INSTRUMENTS/OBJECTS IN THE EARS   You should never clean your ears with a Q-tip, cotton tipped applicator, Nat pin, paper clip, safety pin, pen cap, or any other instrument. This will tend to push wax in deeper and pack the ear canal with wax. There is a high risk and danger of this practice, especially rupture of ear drum, dislocation or other damage to ossicles, and permanent, irreversible, and irreparable hearing loss. It may cause inflammation and irritation of the ear canal and cause itching or pain. I recommend only use of one the several ear wax removal kits available \"over the counter\" if you feel a need to try to remove ear wax. For example, Murine, Bausch and Lomb, NeilMed, or Debrox ear wax removal kits may be used for ear wax removal, as needed. No other methods should be self used for cleaning your ears.

## 2020-07-06 ENCOUNTER — TELEPHONE (OUTPATIENT)
Dept: ENDOCRINOLOGY | Age: 80
End: 2020-07-06

## 2020-07-07 NOTE — TELEPHONE ENCOUNTER
Patient gave verbal permission over the phone to speak to her . Pt's  is going to call [facility] pharmacy to verify they have the correct insurance on file and will call us back if he has any issues.

## 2020-07-08 RX ORDER — INSULIN ASPART 100 [IU]/ML
2 INJECTION, SOLUTION INTRAVENOUS; SUBCUTANEOUS
Qty: 5 PEN | Refills: 3 | Status: SHIPPED | OUTPATIENT
Start: 2020-07-08 | End: 2021-06-08

## 2020-07-08 NOTE — TELEPHONE ENCOUNTER
Pt's  calling back to let us know he called insurance to find out what was cov'd as a alternative and they told him either Novolog or Fiasp.  He would like which ever one Dr prefers and send to pharmacy

## 2020-07-16 ENCOUNTER — TELEPHONE (OUTPATIENT)
Dept: ENDOCRINOLOGY | Age: 80
End: 2020-07-16

## 2020-08-03 ENCOUNTER — HOSPITAL ENCOUNTER (OUTPATIENT)
Age: 80
Discharge: HOME | End: 2020-08-03
Payer: MEDICARE

## 2020-08-03 LAB
A/G RATIO: 1.5 (ref 1.1–2.2)
ALBUMIN SERPL-MCNC: 4.4 G/DL (ref 3.4–5)
ALP BLD-CCNC: 64 U/L (ref 40–129)
ALT SERPL-CCNC: 12 U/L (ref 10–40)
ANION GAP SERPL CALCULATED.3IONS-SCNC: 13 MMOL/L (ref 3–16)
AST SERPL-CCNC: 19 U/L (ref 15–37)
BILIRUB SERPL-MCNC: 1 MG/DL (ref 0–1)
BUN BLDV-MCNC: 20 MG/DL (ref 7–20)
CALCIUM SERPL-MCNC: 9.5 MG/DL (ref 8.3–10.6)
CHLORIDE BLD-SCNC: 104 MMOL/L (ref 99–110)
CO2: 25 MMOL/L (ref 21–32)
CREAT SERPL-MCNC: 1.1 MG/DL (ref 0.6–1.2)
CREATININE URINE: 54.6 MG/DL (ref 28–259)
ESTIMATED AVERAGE GLUCOSE: 154.2 MG/DL
GFR AFRICAN AMERICAN: 58
GFR NON-AFRICAN AMERICAN: 48
GLOBULIN: 3 G/DL
GLUCOSE BLD-MCNC: 125 MG/DL (ref 70–99)
HBA1C MFR BLD: 7 %
MICROALBUMIN UR-MCNC: <1.2 MG/DL
MICROALBUMIN/CREAT UR-RTO: NORMAL MG/G (ref 0–30)
POTASSIUM SERPL-SCNC: 4.1 MMOL/L (ref 3.5–5.1)
SODIUM BLD-SCNC: 142 MMOL/L (ref 136–145)
TOTAL PROTEIN: 7.4 G/DL (ref 6.4–8.2)
TSH SERPL DL<=0.05 MIU/L-ACNC: 2.26 UIU/ML (ref 0.27–4.2)

## 2020-08-03 PROCEDURE — 82043 UR ALBUMIN QUANTITATIVE: CPT

## 2020-08-03 PROCEDURE — 80053 COMPREHEN METABOLIC PANEL: CPT

## 2020-08-03 PROCEDURE — 83036 HEMOGLOBIN GLYCOSYLATED A1C: CPT

## 2020-08-03 PROCEDURE — 82570 ASSAY OF URINE CREATININE: CPT

## 2020-08-03 PROCEDURE — 84443 ASSAY THYROID STIM HORMONE: CPT

## 2020-08-03 PROCEDURE — 84681 ASSAY OF C-PEPTIDE: CPT

## 2020-08-05 LAB — C-PEPTIDE: 1.1 NG/ML (ref 1.1–4.4)

## 2020-08-13 ENCOUNTER — TELEPHONE (OUTPATIENT)
Dept: ENDOCRINOLOGY | Age: 80
End: 2020-08-13

## 2020-08-18 NOTE — TELEPHONE ENCOUNTER
On the log on 6 and 11 the patient has written numbers 33 and 37 I just want to make sure that her fingerstick glucose was not that low it probably was 133 and 137 instead but I could not see the one in the log so please confirm with the patient and let me know.   Thanks

## 2020-08-19 NOTE — TELEPHONE ENCOUNTER
Please advise patient that her labs are still within normal limits she can continue with the current regimen of watching diet

## 2020-08-19 NOTE — TELEPHONE ENCOUNTER
Patient stated she thinks that was a mistake and that it was 133 and 131. She does not remember her sugar going that low. Patient also had labs done 2 weeks ago.

## 2020-08-27 ENCOUNTER — TELEPHONE (OUTPATIENT)
Dept: ENDOCRINOLOGY | Age: 80
End: 2020-08-27

## 2020-09-14 ENCOUNTER — TELEPHONE (OUTPATIENT)
Dept: ENDOCRINOLOGY | Age: 80
End: 2020-09-14

## 2020-09-14 NOTE — TELEPHONE ENCOUNTER
Please advise patient that since her blood sugars have been running  good she can reduce the frequency of fingersticks to maybe twice a day and she can rotate mealtimes i.e. she can check a few before breakfast some days she can check before lunch and some days she can check before dinner numbers

## 2020-09-24 ENCOUNTER — TELEPHONE (OUTPATIENT)
Dept: ENDOCRINOLOGY | Age: 80
End: 2020-09-24

## 2020-09-24 NOTE — TELEPHONE ENCOUNTER
Diabetes log 9/10-9/23/20    When dropping off log the  wanted to know why there are changes each time because it is confusing him and the pt.

## 2020-10-13 ENCOUNTER — TELEPHONE (OUTPATIENT)
Dept: ENDOCRINOLOGY | Age: 80
End: 2020-10-13

## 2020-10-22 ENCOUNTER — TELEPHONE (OUTPATIENT)
Dept: ENDOCRINOLOGY | Age: 80
End: 2020-10-22

## 2020-11-05 ENCOUNTER — TELEPHONE (OUTPATIENT)
Dept: ENDOCRINOLOGY | Age: 80
End: 2020-11-05

## 2020-11-19 ENCOUNTER — TELEPHONE (OUTPATIENT)
Dept: ENDOCRINOLOGY | Age: 80
End: 2020-11-19

## 2020-11-19 NOTE — TELEPHONE ENCOUNTER
Patient aware. She stated she has been eating more than usual and would like to watch her diet before starting the Metformin.

## 2020-12-08 ENCOUNTER — OFFICE VISIT (OUTPATIENT)
Dept: ENDOCRINOLOGY | Age: 80
End: 2020-12-08
Payer: MEDICARE

## 2020-12-08 VITALS
DIASTOLIC BLOOD PRESSURE: 75 MMHG | TEMPERATURE: 97.3 F | RESPIRATION RATE: 16 BRPM | BODY MASS INDEX: 13.44 KG/M2 | WEIGHT: 83.6 LBS | SYSTOLIC BLOOD PRESSURE: 153 MMHG | HEIGHT: 66 IN | HEART RATE: 68 BPM

## 2020-12-08 LAB — HBA1C MFR BLD: 7 %

## 2020-12-08 PROCEDURE — 99214 OFFICE O/P EST MOD 30 MIN: CPT | Performed by: INTERNAL MEDICINE

## 2020-12-08 PROCEDURE — 3051F HG A1C>EQUAL 7.0%<8.0%: CPT | Performed by: INTERNAL MEDICINE

## 2020-12-08 PROCEDURE — 83036 HEMOGLOBIN GLYCOSYLATED A1C: CPT | Performed by: INTERNAL MEDICINE

## 2020-12-08 NOTE — PROGRESS NOTES
Oswaldo Mir is a 79 y.o. 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 Oswaldo Mir was diagnosed with Diabetes mellitus in March 2019 when she was admitted to the hospital with polyuria polydipsia and weight loss. .  At the time of diagnosis patient had polyuria polydipsia and weight loss aprox 15 lbs . She was admitted to hospital for DKA in march 2019 and that is when she was diagnosed with diabetes , her aic was 14.9 she was discharged home on basal bolus insulin. Patient has been watching her diet closely and does not eat enough due to the fear that her blood glucose are good to go elevated. At the same time she does tend to eat snacks between meals so that she does not have hypoglycemia. She denies any hypoglycemia. --On review of chart in November 2015 she had blood work done and her blood glucose was 88, in February 2017 she had a fasting glucose of 106, again in August 2018 she was noted to have a blood glucose of 106 on her Chem-7  In March 2019 when she was admitted to the hospital with a diabetic ketoacidosis her glucose was 665, sodium 125 and anion gap of 27 with a creatinine of 1.8. She had an A1c of 14.9     Comorbid conditions: none    Current diabetic medications include: lantus 2 units fasting glucose 93 --100    --- Weight loss according to the  she has been slowly losing weight in the last 5 years approximately since 2014 when she weighed around 130 to 140 pounds --denied any significant change in appetite at that time. On review of chart she had lost 16 to 17 pounds from November 2017 to March 2019  --In August 2013 she weighed 117 pounds as per office note so actual weight loss might be approximately 30 pounds since 2013 but it has been gradually. INTERIM:    Diabetes   She presents for her follow-up diabetic visit. She has type 1 diabetes mellitus. No MedicAlert identification noted.  The initial diagnosis of diabetes was made 7 months ago. Her disease course has been improving. There are no hypoglycemic associated symptoms. Associated symptoms include polyphagia and polyuria. Pertinent negatives for diabetes include no weight loss. There are no hypoglycemic complications. Symptoms are improving. There are no diabetic complications. Risk factors for coronary artery disease include hypertension. Current diabetic treatment includes insulin injections. She is compliant with treatment most of the time. Her weight is stable. She is following a generally healthy diet. Meal planning includes avoidance of concentrated sweets. She has not had a previous visit with a dietitian. She rarely participates in exercise. Her breakfast blood glucose is taken between 7-8 am. Her breakfast blood glucose range is generally  mg/dl. Her dinner blood glucose range is generally 140-180 mg/dl. An ACE inhibitor/angiotensin II receptor blocker is being taken. She sees a podiatrist.Eye exam is current. Since October 2019 patient had stopped taking insulin but she has been restricting the carbohydrates in her diet so that she does not have to take any insulin her fingerstick glucose data is within reasonable glycemic control. She did not had any hypoglycemia she is not taking any oral hypoglycemic agent either.   She denies any change in her symptoms since the last visit     Weight trend: stable  Prior visit with dietician: no  Current diet: on average, 3 meals per day  Current exercise: rare    She has hypertension and takes medication   She has been diagnosed with Arthritis and is not on meds     Past Medical History:   Diagnosis Date    Hypertension       Patient Active Problem List   Diagnosis    Essential hypertension    Constipation    Primary osteoarthritis of both knees    Acute renal failure (ARF) ([address]    Uncontrolled type 1 diabetes mellitus with hyperglycemia ([address]    Diabetic ketoacidosis without coma associated with type 1 diabetes mellitus [facility]    Bilateral impacted cerumen    Conductive hearing loss, bilateral     Past Surgical History:   Procedure Laterality Date    CHOLECYSTECTOMY       Social History     Socioeconomic History    Marital status:      Spouse name: Not on file    Number of children: Not on file    Years of education: Not on file    Highest education level: Not on file   Occupational History    Not on file   Social Needs    Financial resource strain: Not on file    Food insecurity     Worry: Not on file     Inability: Not on file    Transportation needs     Medical: Not on file     Non-medical: Not on file   Tobacco Use    Smoking status: Former Smoker     Packs/day: 0.50     Years: 10.00     Pack years: 5.00     Types: Cigarettes    Smokeless tobacco: Never Used   Substance and Sexual Activity    Alcohol use: No    Drug use: No    Sexual activity: Not on file   Lifestyle    Physical activity     Days per week: Not on file     Minutes per session: Not on file    Stress: Not on file   Relationships    Social connections     Talks on phone: Not on file     Gets together: Not on file     Attends Voodoo service: Not on file     Active member of club or organization: Not on file     Attends meetings of clubs or organizations: Not on file     Relationship status: Not on file    Intimate partner violence     Fear of current or ex partner: Not on file     Emotionally abused: Not on file     Physically abused: Not on file     Forced sexual activity: Not on file   Other Topics Concern    Not on file   Social History Narrative    Not on file     Family History   Problem Relation Age of Onset    Other Brother         hypoglycemia      Current Outpatient Medications   Medication Sig Dispense Refill    lisinopril (PRINIVIL;ZESTRIL) 5 MG tablet Take 1 tablet by mouth daily 90 tablet 0    ONETOUCH VERIO strip Test blood sugar TID dx: e10.65 300 each 5    OneTouch Delica Lancets 60D MISC Test blood sugar TID dx: e10.65 300 each 5    Lancets MISC Test blood sugar TID dx: e10.65 300 each 3    Alcohol Swabs 70 % PADS 1 each by Does not apply route 3 times daily 300 each 3    Insulin Pen Needle (MEIJER PEN NEEDLES) 31G X 6 MM MISC 1 each by Does not apply route daily 100 each 3    insulin aspart (NOVOLOG FLEXPEN) 100 UNIT/ML injection pen Inject 2 Units into the skin 3 times daily (before meals) (Patient not taking: Reported on 12/8/2020) 5 pen 3     No current facility-administered medications for this visit. Allergies   Allergen Reactions    Asa [Aspirin]     Codeine     Meloxicam     Penicillins      Family Status   Relation Name Status    Brother  (Not Specified)       Review of Systems  I have reviewed the review of system questionnaire filled by the patient .   Patient was advised to contact PCP for non endocrine signs and symptoms       OBJECTIVE:  BP (!) 153/75   Pulse 68   Temp 97.3 °F (36.3 °C)   Resp 16   Ht 5' 5.5\" (1.664 m)   Wt 83 lb 9.6 oz (37.9 kg)   BMI 13.70 kg/m²    Wt Readings from Last 3 Encounters:   12/08/20 83 lb 9.6 oz (37.9 kg)   11/24/20 86 lb (39 kg)   08/25/20 86 lb (39 kg)       Constitutional: no acute distress, well appearing, well nourished  Psychiatric: oriented to person, place and time, judgement, insight and normal, recent and remote memory and intact and mood, affect are normal  Skin: skin and subcutaneous tissue is normal without mass,   Head and Face: examination of head and face revealed no abnormalities  Eyes: no lid or conjunctival swelling, no erythema or discharge, pupils are normal,   Ears/Nose: external inspection of ears and nose revealed no abnormalities, hearing is grossly normal  Oropharynx/Mouth/Face: lips, tongue and gums are normal with no lesions, the voice quality was normal  Neck: neck is supple and symmetric, with midline trachea and no masses, thyroid is normal    Pulmonary: no increased work of breathing or signs of respiratory distress, lungs are clear to auscultation  Cardiovascular: normal heart rate and rhythm, normal S1 and S2,   Musculoskeletal: normal gait and station,   Neurological: normal coordination, normal general cortical function          Lab Results   Component Value Date    LABA1C 7.0 12/08/2020    LABA1C 7.0 08/03/2020    LABA1C 6.9 06/23/2020         ASSESSMENT/PLAN:  ----  New onset diabetes diagnosed in March 2019 ---ELIDA vandana antibody strongly positive C-peptide of 1.8 in October 2019  aic 14>>6.2>>6.3>>6.9>>6.9>>7.0    She is not on any medication  and has been restricting her carbs and watching diet   We discussed starting metformin she is opposed to taking it and will like to send me her logs to me for the next few months and if fingerstick blood glucose stay elevated she will take Glucophage ,     She will call me if fingerstick blood glucose go above 150   She will reduce the frequency of testing to BID     Patient was advised that sending of her fingerstick blood glucose logs is crucial in management of her  diabetes. I will adjust the  doses of diabetic medications  according to sent data. Health Maintenance       Last Eye Exam: advised to have annual dilated eye exam. her last eye exam was in 2020   Last Podiatry Exam:  Last foot exam was 2020 she follows with podiatry every 3 months Dr Luis Gonzalez: Last LDL level was 68 in feb 2020  patient is not on any medication at the time of the blood work she did not carry the diagnosis of diabetes  Microalbumin/Creatinine Ratio: Normal in feb 2020 . Education: Reviewed ABCs of diabetes management (respective goals in parentheses): A1C (<7), blood pressure (<130/80), and cholesterol (LDL <100). Additional Education: Patient has seen diabetes Educator. 2. Essential hypertension  On medication   BP high today advised to go to Er if develops any of those S/s   She denies any chestpain denies any headaches denies any SOB   BP high today she feels the anxiety     3. Primary osteoarthritis of both knees  Pt denies any significant hypoglycemia     4. Constipation, unspecified constipation type  Chronic problem     5. LOW BMI  Patient weight 117 lbs in 2013 but now weighs 88   Gradual weight loss over the years. Today again we again discussed that she needs to consume more calories and more carbohydrates and if required she can take 1 to 2 units of Humalog with the meals she seems to be fearful of the idea of taking insulin we tried to alleviate her anxiety around insulin dosing. Encouraged to work on diet and exercise and follow-up with the primary care physician for work-up of weight loss if continues          Reviewed and/or ordered clinical lab results yes   Reviewed and/or ordered radiology tests Yes  Reviewed and/or ordered other diagnostic tests yes   Made a decision to obtain old records yes   Reviewed and summarized old records yes     Alejo Escoto was counseled regarding symptoms of current diagnosis, course and complications of disease if inadequately treated, side effects of medications, diagnosis, treatment options, and prognosis, risks, benefits, complications, and alternatives of treatment, labs, imaging and other studies and treatment targets and goals. She understands instructions and counseling    I spent  25 + minutes with patient and more than 50 % of this time was spent discussing and providing counseling and coordinating care of patient's multiple health issues      Return in about 6 months (around 6/8/2021). Please note that some or all of this report was generated using voice recognition software. Please notify me in case of any questions about the content of this document, as some errors in transcription may have occurred .

## 2020-12-08 NOTE — PATIENT INSTRUCTIONS
Patient Education          Introduction     Metformin (such as Glucophage) is a medicine used to treat type 2 diabetes. It helps keep blood sugar levels on target. You may have tried to eat a healthy diet, lose weight, and get more exercise to keep your blood sugar in your target range. If those things do not help, you may take a medicine called metformin. It helps your body use insulin. This can help you control your blood sugar. You might take it on its own or with other medicines. When taken on its own, metformin should not cause low blood sugar or weight gain. Example  · Metformin (Glucophage)  Possible side effects  Common side effects include:  · Short-term nausea. · Not feeling hungry. · Diarrhea. · Increased gas in your belly. · A metallic taste. You may have side effects or reactions not listed here. Check the information that comes with your medicine. What to know about taking this medicine  · Metformin does not usually cause low blood sugar. But you may get a low blood sugar when you take metformin and you exercise hard, drink alcohol, or you do not eat enough food. · Sometimes metformin is combined with other diabetes medicine. Some of these can cause low blood sugar. · If you need a test that uses a dye or you need to have surgery, be sure to tell all of your doctors that you take metformin. You may have to stop taking it before and after the test or surgery. · Over time, blood levels of vitamin B12 can decrease in some people who take metformin. Your body needs this B vitamin to make blood cells. It also keeps your nervous system healthy. If you have been taking metformin for more than a few years, ask your doctor if you need a B12 blood test to measure the amount of vitamin B12 in your blood. · Be safe with medicines. Take your medicines exactly as prescribed. Call your doctor if you think you are having a problem with your medicine.   · Check with your doctor or pharmacist before you use any other medicines. This includes over-the-counter medicines. Make sure your doctor knows all of the medicines, vitamins, herbal products, and supplements you take. Taking some medicines together can cause problems. Where can you learn more? Go to https://chpexiang.[employer]. org and sign in to your [employer] account. Enter J360 in the MediaLink box to learn more about \"Learning About Metformin for Type 2 Diabetes. \"     If you do not have an account, please click on the \"Sign Up Now\" link. Current as of: December 20, 2019               Content Version: 12.6  © 6035-6762 [employer]. Care instructions adapted under license by [facility] ([facility]). If you have questions about a medical condition or this instruction, always ask your healthcare professional. [address] any warranty or liability for your use of this information.

## 2020-12-17 ENCOUNTER — TELEPHONE (OUTPATIENT)
Dept: ENDOCRINOLOGY | Age: 80
End: 2020-12-17

## 2021-01-07 ENCOUNTER — TELEPHONE (OUTPATIENT)
Dept: ENDOCRINOLOGY | Age: 81
End: 2021-01-07

## 2021-01-07 DIAGNOSIS — E10.65 UNCONTROLLED TYPE 1 DIABETES MELLITUS WITH HYPERGLYCEMIA (HCC): Primary | ICD-10-CM

## 2021-01-11 RX ORDER — LANCETS 33 GAUGE
EACH MISCELLANEOUS
Qty: 300 EACH | Refills: 5 | Status: SHIPPED | OUTPATIENT
Start: 2021-01-11 | End: 2022-04-06

## 2021-01-11 RX ORDER — BLOOD SUGAR DIAGNOSTIC
STRIP MISCELLANEOUS
Qty: 300 EACH | Refills: 5 | Status: SHIPPED | OUTPATIENT
Start: 2021-01-11 | End: 2022-04-04

## 2021-02-11 ENCOUNTER — TELEPHONE (OUTPATIENT)
Dept: ENDOCRINOLOGY | Age: 81
End: 2021-02-11

## 2021-02-11 NOTE — TELEPHONE ENCOUNTER
Please advise Pt that her glucose are running higher than usual   I would recommend starting metformin  daily

## 2021-02-12 RX ORDER — METFORMIN HYDROCHLORIDE 500 MG/1
500 TABLET, EXTENDED RELEASE ORAL
Qty: 90 TABLET | Refills: 1 | Status: SHIPPED | OUTPATIENT
Start: 2021-02-12 | End: 2021-02-15

## 2021-02-15 ENCOUNTER — TELEPHONE (OUTPATIENT)
Dept: ENDOCRINOLOGY | Age: 81
End: 2021-02-15

## 2021-02-15 NOTE — TELEPHONE ENCOUNTER
Called pt and advised below. Pt stated she cannot take the Metformin, it is too big. She already has some at home. Pt would like alternate medication. Please advise?

## 2021-02-15 NOTE — TELEPHONE ENCOUNTER
PT's states that the Metformin 500 mg pill is too large for the PT to take. Would like to know if this pill comes in a smaller form.   Also, one of the side effects for the mediation is diarrhea and the PT's  states that will not be good for the PT.  PT's  would like to have a new medication phoned into [facility] on [address] [facility]

## 2021-02-16 NOTE — TELEPHONE ENCOUNTER
Called pt and informed of below. Pt states she has a colonoscopy getting schedule next week. Is she able to take this medication before the colonoscopy or no?

## 2021-02-16 NOTE — TELEPHONE ENCOUNTER
Called pt and informed. Pt will call us and let us know when this is scheduled. Pt will check her sugar and keep us posted on readings.

## 2021-02-24 ENCOUNTER — HOSPITAL ENCOUNTER (OUTPATIENT)
Age: 81
Discharge: HOME | End: 2021-02-24
Payer: MEDICARE

## 2021-02-24 DIAGNOSIS — R19.00 ABDOMINAL MASS, UNSPECIFIED ABDOMINAL LOCATION: ICD-10-CM

## 2021-02-24 DIAGNOSIS — I10 ESSENTIAL HYPERTENSION: ICD-10-CM

## 2021-02-24 DIAGNOSIS — R63.4 WEIGHT LOSS: ICD-10-CM

## 2021-02-24 LAB
A/G RATIO: 1.4 (ref 1.1–2.2)
ALBUMIN SERPL-MCNC: 4.1 G/DL (ref 3.4–5)
ALP BLD-CCNC: 71 U/L (ref 40–129)
ALT SERPL-CCNC: 14 U/L (ref 10–40)
ANION GAP SERPL CALCULATED.3IONS-SCNC: 9 MMOL/L (ref 3–16)
AST SERPL-CCNC: 21 U/L (ref 15–37)
BILIRUB SERPL-MCNC: 0.9 MG/DL (ref 0–1)
BUN BLDV-MCNC: 19 MG/DL (ref 7–20)
CALCIUM SERPL-MCNC: 9.7 MG/DL (ref 8.3–10.6)
CHLORIDE BLD-SCNC: 98 MMOL/L (ref 99–110)
CO2: 26 MMOL/L (ref 21–32)
CREAT SERPL-MCNC: 1 MG/DL (ref 0.6–1.2)
GFR AFRICAN AMERICAN: >60
GFR NON-AFRICAN AMERICAN: 53
GLOBULIN: 2.9 G/DL
GLUCOSE BLD-MCNC: 138 MG/DL (ref 70–99)
HCT VFR BLD CALC: 37.5 % (ref 36–48)
HEMOGLOBIN: 12.7 G/DL (ref 12–16)
MCH RBC QN AUTO: 29 PG (ref 26–34)
MCHC RBC AUTO-ENTMCNC: 33.9 G/DL (ref 31–36)
MCV RBC AUTO: 85.7 FL (ref 80–100)
PDW BLD-RTO: 14.8 % (ref 12.4–15.4)
PLATELET # BLD: 164 K/UL (ref 135–450)
PMV BLD AUTO: 10.4 FL (ref 5–10.5)
POTASSIUM SERPL-SCNC: 4.3 MMOL/L (ref 3.5–5.1)
RBC # BLD: 4.38 M/UL (ref 4–5.2)
SODIUM BLD-SCNC: 133 MMOL/L (ref 136–145)
TOTAL PROTEIN: 7 G/DL (ref 6.4–8.2)
WBC # BLD: 3.9 K/UL (ref 4–11)

## 2021-02-24 PROCEDURE — 80053 COMPREHEN METABOLIC PANEL: CPT

## 2021-02-24 PROCEDURE — 85027 COMPLETE CBC AUTOMATED: CPT

## 2021-02-24 PROCEDURE — 36415 COLL VENOUS BLD VENIPUNCTURE: CPT

## 2021-02-25 ENCOUNTER — TELEPHONE (OUTPATIENT)
Dept: ENDOCRINOLOGY | Age: 81
End: 2021-02-25

## 2021-03-02 ENCOUNTER — HOSPITAL ENCOUNTER (OUTPATIENT)
Dept: ULTRASOUND IMAGING | Age: 81
Discharge: HOME | End: 2021-03-02
Payer: MEDICARE

## 2021-03-02 DIAGNOSIS — R19.00 ABDOMINAL MASS, UNSPECIFIED ABDOMINAL LOCATION: ICD-10-CM

## 2021-03-02 PROCEDURE — 76856 US EXAM PELVIC COMPLETE: CPT

## 2021-03-02 PROCEDURE — 76700 US EXAM ABDOM COMPLETE: CPT

## 2021-03-11 ENCOUNTER — TELEPHONE (OUTPATIENT)
Dept: ENDOCRINOLOGY | Age: 81
End: 2021-03-11

## 2021-03-11 NOTE — TELEPHONE ENCOUNTER
Pt's  stopped by office to drop off sugar log and had some questions also. Wants to know when pt should get her A1-C lab drawn since her appt is not until June 2021? Now or later? Also states that pt has not started taking her Januvia because she is concerned about her abdominal pain that she's been having and wants to wait until she sees on her family Dr on 3-22-21 to go over her ultrasound abd/pelvis results.  Pt is also concerned that when she does start her Januvia that her sugar #\"s will go to low in the evening/bedtime

## 2021-03-11 NOTE — TELEPHONE ENCOUNTER
Please advise I do not expect having hypoglycemia with Januvia.   She can start taking it after she sees her medical doctor

## 2021-03-11 NOTE — TELEPHONE ENCOUNTER
Please inquire if patient started taking Januvia or not, if she still has not started it she can start taking it she should continue watching her diet

## 2021-04-08 ENCOUNTER — TELEPHONE (OUTPATIENT)
Dept: ENDOCRINOLOGY | Age: 81
End: 2021-04-08

## 2021-05-12 ENCOUNTER — HOSPITAL ENCOUNTER (OUTPATIENT)
Dept: CT IMAGING | Age: 81
Discharge: HOME | End: 2021-05-12
Payer: MEDICARE

## 2021-05-12 DIAGNOSIS — E11.9 TYPE 2 DIABETES MELLITUS WITHOUT COMPLICATION, UNSPECIFIED WHETHER LONG TERM INSULIN USE (HCC): ICD-10-CM

## 2021-05-12 DIAGNOSIS — N13.39 OTHER HYDRONEPHROSIS: ICD-10-CM

## 2021-05-12 DIAGNOSIS — I10 ESSENTIAL HYPERTENSION: ICD-10-CM

## 2021-05-12 PROCEDURE — 74176 CT ABD & PELVIS W/O CONTRAST: CPT

## 2021-05-17 ENCOUNTER — TELEPHONE (OUTPATIENT)
Dept: ENDOCRINOLOGY | Age: 81
End: 2021-05-17

## 2021-05-17 NOTE — TELEPHONE ENCOUNTER
PT  states she was given a sugar pill 4-5 days ago to level out her blood sugar. He stated she also received a script for metformin and needs a call back to let her know what she is supposed to take both or one or the other.

## 2021-05-21 ENCOUNTER — HOSPITAL ENCOUNTER (OUTPATIENT)
Age: 81
Discharge: HOME | End: 2021-05-21
Payer: MEDICARE

## 2021-05-21 ENCOUNTER — TELEPHONE (OUTPATIENT)
Dept: ENDOCRINOLOGY | Age: 81
End: 2021-05-21

## 2021-05-21 DIAGNOSIS — E10.65 UNCONTROLLED TYPE 1 DIABETES MELLITUS WITH HYPERGLYCEMIA (HCC): ICD-10-CM

## 2021-05-21 LAB
A/G RATIO: 1.7 (ref 1.1–2.2)
ALBUMIN SERPL-MCNC: 4.3 G/DL (ref 3.4–5)
ALP BLD-CCNC: 74 U/L (ref 40–129)
ALT SERPL-CCNC: 13 U/L (ref 10–40)
ANION GAP SERPL CALCULATED.3IONS-SCNC: 11 MMOL/L (ref 3–16)
AST SERPL-CCNC: 21 U/L (ref 15–37)
BILIRUB SERPL-MCNC: 0.6 MG/DL (ref 0–1)
BUN BLDV-MCNC: 36 MG/DL (ref 7–20)
CALCIUM SERPL-MCNC: 9 MG/DL (ref 8.3–10.6)
CHLORIDE BLD-SCNC: 101 MMOL/L (ref 99–110)
CHOLESTEROL, TOTAL: 224 MG/DL (ref 0–199)
CO2: 24 MMOL/L (ref 21–32)
CREAT SERPL-MCNC: 1.1 MG/DL (ref 0.6–1.2)
CREATININE URINE: 33 MG/DL (ref 28–259)
GFR AFRICAN AMERICAN: 58
GFR NON-AFRICAN AMERICAN: 48
GLOBULIN: 2.6 G/DL
GLUCOSE BLD-MCNC: 150 MG/DL (ref 70–99)
HDLC SERPL-MCNC: 89 MG/DL (ref 40–60)
LDL CHOLESTEROL CALCULATED: 124 MG/DL
MICROALBUMIN UR-MCNC: <1.2 MG/DL
MICROALBUMIN/CREAT UR-RTO: NORMAL MG/G (ref 0–30)
POTASSIUM SERPL-SCNC: 3.9 MMOL/L (ref 3.5–5.1)
SODIUM BLD-SCNC: 136 MMOL/L (ref 136–145)
TOTAL PROTEIN: 6.9 G/DL (ref 6.4–8.2)
TRIGL SERPL-MCNC: 55 MG/DL (ref 0–150)
TSH SERPL DL<=0.05 MIU/L-ACNC: 2.52 UIU/ML (ref 0.27–4.2)
VLDLC SERPL CALC-MCNC: 11 MG/DL

## 2021-05-21 PROCEDURE — 83036 HEMOGLOBIN GLYCOSYLATED A1C: CPT

## 2021-05-21 PROCEDURE — 36415 COLL VENOUS BLD VENIPUNCTURE: CPT

## 2021-05-21 PROCEDURE — 82570 ASSAY OF URINE CREATININE: CPT

## 2021-05-21 PROCEDURE — 80053 COMPREHEN METABOLIC PANEL: CPT

## 2021-05-21 PROCEDURE — 84681 ASSAY OF C-PEPTIDE: CPT

## 2021-05-21 PROCEDURE — 84443 ASSAY THYROID STIM HORMONE: CPT

## 2021-05-21 PROCEDURE — 80061 LIPID PANEL: CPT

## 2021-05-21 PROCEDURE — 82043 UR ALBUMIN QUANTITATIVE: CPT

## 2021-05-22 LAB
ESTIMATED AVERAGE GLUCOSE: 171.4 MG/DL
HBA1C MFR BLD: 7.6 %

## 2021-05-25 LAB — C-PEPTIDE: 1.8 NG/ML (ref 1.1–4.4)

## 2021-06-08 ENCOUNTER — OFFICE VISIT (OUTPATIENT)
Dept: ENDOCRINOLOGY | Age: 81
End: 2021-06-08
Payer: MEDICARE

## 2021-06-08 VITALS
RESPIRATION RATE: 16 BRPM | BODY MASS INDEX: 12.86 KG/M2 | DIASTOLIC BLOOD PRESSURE: 68 MMHG | HEIGHT: 66 IN | WEIGHT: 80 LBS | HEART RATE: 62 BPM | SYSTOLIC BLOOD PRESSURE: 146 MMHG

## 2021-06-08 DIAGNOSIS — E13.9 DIABETES MELLITUS TYPE 1.5 (HCC): Primary | ICD-10-CM

## 2021-06-08 PROCEDURE — 3051F HG A1C>EQUAL 7.0%<8.0%: CPT | Performed by: INTERNAL MEDICINE

## 2021-06-08 PROCEDURE — 99214 OFFICE O/P EST MOD 30 MIN: CPT | Performed by: INTERNAL MEDICINE

## 2021-06-18 ENCOUNTER — TELEPHONE (OUTPATIENT)
Dept: ENDOCRINOLOGY | Age: 81
End: 2021-06-18

## 2021-06-18 NOTE — TELEPHONE ENCOUNTER
pts  requests a call back, his wife has been vomiting and not able to eat since her increase to 100 mg of Januvia a week ago.

## 2021-06-18 NOTE — TELEPHONE ENCOUNTER
Plz advise Pt that she can stop the [place] and [place] for a few days and if vomiting persists she might have to go the ER   Also once she feels better she can go back to 1/2 tab [place] which She was tolerating without any side-effects

## 2021-06-21 ENCOUNTER — ANESTHESIA (OUTPATIENT)
Dept: ENDOSCOPY | Age: 81
End: 2021-06-21
Payer: MEDICARE

## 2021-06-21 ENCOUNTER — HOSPITAL ENCOUNTER (OUTPATIENT)
Age: 81
Setting detail: OUTPATIENT SURGERY
Discharge: HOME | End: 2021-06-21
Attending: INTERNAL MEDICINE | Admitting: INTERNAL MEDICINE
Payer: MEDICARE

## 2021-06-21 ENCOUNTER — ANESTHESIA EVENT (OUTPATIENT)
Dept: ENDOSCOPY | Age: 81
End: 2021-06-21
Payer: MEDICARE

## 2021-06-21 VITALS
RESPIRATION RATE: 14 BRPM | OXYGEN SATURATION: 100 % | DIASTOLIC BLOOD PRESSURE: 61 MMHG | SYSTOLIC BLOOD PRESSURE: 146 MMHG

## 2021-06-21 VITALS
SYSTOLIC BLOOD PRESSURE: 111 MMHG | HEIGHT: 65 IN | HEART RATE: 50 BPM | RESPIRATION RATE: 14 BRPM | WEIGHT: 82 LBS | TEMPERATURE: 96.3 F | BODY MASS INDEX: 13.66 KG/M2 | DIASTOLIC BLOOD PRESSURE: 97 MMHG | OXYGEN SATURATION: 100 %

## 2021-06-21 LAB
GLUCOSE BLD-MCNC: 173 MG/DL (ref 70–99)
GLUCOSE BLD-MCNC: 185 MG/DL (ref 70–99)
PERFORMED ON: ABNORMAL
PERFORMED ON: ABNORMAL

## 2021-06-21 PROCEDURE — 7100000010 HC PHASE II RECOVERY - FIRST 15 MIN: Performed by: INTERNAL MEDICINE

## 2021-06-21 PROCEDURE — 3700000000 HC ANESTHESIA ATTENDED CARE: Performed by: INTERNAL MEDICINE

## 2021-06-21 PROCEDURE — 88305 TISSUE EXAM BY PATHOLOGIST: CPT

## 2021-06-21 PROCEDURE — 3609010600 HC COLONOSCOPY POLYPECTOMY SNARE/COLD BIOPSY: Performed by: INTERNAL MEDICINE

## 2021-06-21 PROCEDURE — 3609019800 HC COLONOSCOPY WITH SUBMUCOSAL INJECTION: Performed by: INTERNAL MEDICINE

## 2021-06-21 PROCEDURE — 2580000003 HC RX 258: Performed by: NURSE ANESTHETIST, CERTIFIED REGISTERED

## 2021-06-21 PROCEDURE — 3700000001 HC ADD 15 MINUTES (ANESTHESIA): Performed by: INTERNAL MEDICINE

## 2021-06-21 PROCEDURE — 2500000003 HC RX 250 WO HCPCS: Performed by: NURSE ANESTHETIST, CERTIFIED REGISTERED

## 2021-06-21 PROCEDURE — 2709999900 HC NON-CHARGEABLE SUPPLY: Performed by: INTERNAL MEDICINE

## 2021-06-21 PROCEDURE — 3609010300 HC COLONOSCOPY W/BIOPSY SINGLE/MULTIPLE: Performed by: INTERNAL MEDICINE

## 2021-06-21 PROCEDURE — 2580000003 HC RX 258: Performed by: FAMILY MEDICINE

## 2021-06-21 PROCEDURE — 7100000011 HC PHASE II RECOVERY - ADDTL 15 MIN: Performed by: INTERNAL MEDICINE

## 2021-06-21 PROCEDURE — 6360000002 HC RX W HCPCS: Performed by: NURSE ANESTHETIST, CERTIFIED REGISTERED

## 2021-06-21 RX ORDER — PROPOFOL 10 MG/ML
INJECTION, EMULSION INTRAVENOUS PRN
Status: DISCONTINUED | OUTPATIENT
Start: 2021-06-21 | End: 2021-06-21 | Stop reason: SDUPTHER

## 2021-06-21 RX ORDER — SODIUM CHLORIDE 9 MG/ML
INJECTION, SOLUTION INTRAVENOUS CONTINUOUS PRN
Status: DISCONTINUED | OUTPATIENT
Start: 2021-06-21 | End: 2021-06-21 | Stop reason: SDUPTHER

## 2021-06-21 RX ORDER — LIDOCAINE HYDROCHLORIDE 20 MG/ML
INJECTION, SOLUTION INFILTRATION; PERINEURAL PRN
Status: DISCONTINUED | OUTPATIENT
Start: 2021-06-21 | End: 2021-06-21 | Stop reason: SDUPTHER

## 2021-06-21 RX ORDER — PROPOFOL 10 MG/ML
INJECTION, EMULSION INTRAVENOUS CONTINUOUS PRN
Status: DISCONTINUED | OUTPATIENT
Start: 2021-06-21 | End: 2021-06-21 | Stop reason: SDUPTHER

## 2021-06-21 RX ORDER — SODIUM CHLORIDE 9 MG/ML
INJECTION, SOLUTION INTRAVENOUS CONTINUOUS
Status: DISCONTINUED | OUTPATIENT
Start: 2021-06-21 | End: 2021-06-21 | Stop reason: HOSPADM

## 2021-06-21 RX ADMIN — PHENYLEPHRINE HYDROCHLORIDE 25 MCG: 10 INJECTION INTRAVENOUS at 09:26

## 2021-06-21 RX ADMIN — PHENYLEPHRINE HYDROCHLORIDE 25 MCG: 10 INJECTION INTRAVENOUS at 08:58

## 2021-06-21 RX ADMIN — PHENYLEPHRINE HYDROCHLORIDE 25 MCG: 10 INJECTION INTRAVENOUS at 09:15

## 2021-06-21 RX ADMIN — PHENYLEPHRINE HYDROCHLORIDE 25 MCG: 10 INJECTION INTRAVENOUS at 09:22

## 2021-06-21 RX ADMIN — PHENYLEPHRINE HYDROCHLORIDE 25 MCG: 10 INJECTION INTRAVENOUS at 09:03

## 2021-06-21 RX ADMIN — SODIUM CHLORIDE: 9 INJECTION, SOLUTION INTRAVENOUS at 07:55

## 2021-06-21 RX ADMIN — LIDOCAINE HYDROCHLORIDE 100 MG: 20 INJECTION, SOLUTION INFILTRATION; PERINEURAL at 08:39

## 2021-06-21 RX ADMIN — PROPOFOL 80 MCG/KG/MIN: 10 INJECTION, EMULSION INTRAVENOUS at 08:40

## 2021-06-21 RX ADMIN — PHENYLEPHRINE HYDROCHLORIDE 25 MCG: 10 INJECTION INTRAVENOUS at 09:07

## 2021-06-21 RX ADMIN — SODIUM CHLORIDE: 9 INJECTION, SOLUTION INTRAVENOUS at 08:00

## 2021-06-21 RX ADMIN — PHENYLEPHRINE HYDROCHLORIDE 25 MCG: 10 INJECTION INTRAVENOUS at 09:11

## 2021-06-21 RX ADMIN — PROPOFOL 20 MG: 10 INJECTION, EMULSION INTRAVENOUS at 08:44

## 2021-06-21 RX ADMIN — PROPOFOL 30 MG: 10 INJECTION, EMULSION INTRAVENOUS at 08:40

## 2021-06-21 ASSESSMENT — PAIN SCALES - GENERAL: PAINLEVEL_OUTOF10: 0

## 2021-06-21 ASSESSMENT — PAIN - FUNCTIONAL ASSESSMENT: PAIN_FUNCTIONAL_ASSESSMENT: 0-10

## 2021-06-21 NOTE — ANESTHESIA PRE PROCEDURE
Department of Anesthesiology  Preprocedure Note       Name:  Annelise Phillips   Age:  79 y.o.  :  1940                                          MRN:  [id number]         Date:  2021      Surgeon: Brooklyn Cruz):  Hemalatha Donovan MD    Procedure: Procedure(s):  COLONOSCOPY DIAGNOSTIC    Medications prior to admission:   Prior to Admission medications    Medication Sig Start Date End Date Taking? Authorizing Provider   SITagliptin (JANUVIA) 100 MG tablet Take 1 tablet by mouth daily 21   Aadrsh Ibrara MD   lisinopril (PRINIVIL;ZESTRIL) 5 MG tablet Take 1 tablet by mouth daily 21   Alfonzo Galindo MD   Ronnie Forte strip Test blood sugar TID dx: e10.65 21   Adarsh Ibarra MD   OneTouch Delica Lancets 77T MISC Test blood sugar TID dx: e10.65 21   Adarsh Ibarra MD       Current medications:    No current facility-administered medications for this encounter. Allergies: Allergies   Allergen Reactions    Asa [Aspirin]      nausea    Codeine      dizzy    Meloxicam      Not sure of reaction    Penicillins      rash       Problem List:    Patient Active Problem List   Diagnosis Code    Essential hypertension I10    Constipation K59.00    Primary osteoarthritis of both knees M17.0    Acute renal failure (ARF) ([address] N17.9    Uncontrolled type 1 diabetes mellitus with hyperglycemia ([address] E10.65    Diabetic ketoacidosis without coma associated with type 1 diabetes mellitus (HCC) E10.10    Bilateral impacted cerumen H61.23    Conductive hearing loss, bilateral H90.0       Past Medical History:        Diagnosis Date    Diabetes mellitus ([address]     Hypertension        Past Surgical History:        Procedure Laterality Date    CHOLECYSTECTOMY         Social History:    Social History     Tobacco Use    Smoking status: Former Smoker     Packs/day: 0.50     Years: 10.00     Pack years: 5.00     Types: Cigarettes    Smokeless tobacco: Never Used   Substance Use Topics    Alcohol use:  No Cardiovascular:    (+) hypertension:,         Rhythm: regular  Rate: normal                    Neuro/Psych:               GI/Hepatic/Renal:             Endo/Other:    (+) Diabetes, . Abdominal:           Vascular:                                        Anesthesia Plan      MAC     ASA 2       Induction: intravenous. Anesthetic plan and risks discussed with patient. Plan discussed with CRNA.                   Chip Martell MD   6/21/2021

## 2021-06-21 NOTE — PROGRESS NOTES
Patient hands very cold and fluctuating pulse ox reading. Patient states she is scheduled an appointment with her PCP on Thursday.
Post-procedure education reviewed with patient and visitor, and they verbalized understanding.
Teaching / education initiated regarding perioperative experience, expectations, and pain management during stay. Patient verbalized understanding.
To endo recovery from procedure room. VSS, awakens to voice, respirations easy and unlabored. Recovery blood sugar 173.   Patient stable
expected to remain in the car with a cell phone for communication. If the ride is leaving the hospital grounds please make sure they are back in time for pickup. Have the patient inform the staff on arrival what their rides plans are while the patient is in the facility. At the MAIN there is one visitor allowed. Please note that the visitor policy is subject to change.

## 2021-06-21 NOTE — H&P
Gastroenterology Note                 Pre-operative History and Physical    Patient: Ben Ball  : 1940  CSN:     History Obtained From:   Patient or guardian. HISTORY OF PRESENT ILLNESS:    The patient is a 79 y.o. female here for Endoscopy. Past Medical History:    Past Medical History:   Diagnosis Date    Diabetes mellitus [facility]     Hypertension      Past Surgical History:    Past Surgical History:   Procedure Laterality Date    CHOLECYSTECTOMY       Medications Prior to Admission:   No current facility-administered medications on file prior to encounter. Current Outpatient Medications on File Prior to Encounter   Medication Sig Dispense Refill    ONETOUCH VERIO strip Test blood sugar TID dx: e10.65 300 each 5    OneTouch Delica Lancets 32P MISC Test blood sugar TID dx: e10.65 300 each 5        Allergies:  Asa [aspirin], Codeine, Meloxicam, and Penicillins      Social History:   Social History     Tobacco Use    Smoking status: Former Smoker     Packs/day: 0.50     Years: 10.00     Pack years: 5.00     Types: Cigarettes    Smokeless tobacco: Never Used   Substance Use Topics    Alcohol use: No     Family History:   Family History   Problem Relation Age of Onset    Other Brother         hypoglycemia        PHYSICAL EXAM:      BP (!) 204/86 Comment: left arm  Pulse 70   Temp 96.7 °F (35.9 °C) (Temporal)   Resp 16   Ht 5' 5\" (1.651 m)   Wt 82 lb (37.2 kg)   SpO2 99%   BMI 13.65 kg/m²  I        Heart:  RRR, normal s1s2    Lungs:  CTA and normal effort    Abdomen:   Soft, nt nd. ASSESSMENT AND PLAN:    1. Patient is a 79 y.o. female here for endoscopy with MAC sedation. 2.  Procedure options, risks and benefits reviewed with patient and/or guardian. They express understanding.

## 2021-07-16 ENCOUNTER — TELEPHONE (OUTPATIENT)
Dept: ENDOCRINOLOGY | Age: 81
End: 2021-07-16

## 2021-08-03 ENCOUNTER — OFFICE VISIT (OUTPATIENT)
Dept: SURGERY | Age: 81
End: 2021-08-03
Payer: MEDICARE

## 2021-08-03 VITALS — WEIGHT: 80.4 LBS | BODY MASS INDEX: 13.38 KG/M2 | SYSTOLIC BLOOD PRESSURE: 142 MMHG | DIASTOLIC BLOOD PRESSURE: 88 MMHG

## 2021-08-03 DIAGNOSIS — D12.2 ADENOMA OF ASCENDING COLON: Primary | ICD-10-CM

## 2021-08-03 PROCEDURE — 99203 OFFICE O/P NEW LOW 30 MIN: CPT | Performed by: SURGERY

## 2021-08-03 ASSESSMENT — ENCOUNTER SYMPTOMS
RESPIRATORY NEGATIVE: 1
ALLERGIC/IMMUNOLOGIC NEGATIVE: 1
CONSTIPATION: 1

## 2021-08-03 NOTE — PROGRESS NOTES
and Penicillins    Social History:    reports that she has quit smoking. Her smoking use included cigarettes. She has a 5.00 pack-year smoking history. She has never used smokeless tobacco. She reports that she does not drink alcohol and does not use drugs. Family History:        Problem Relation Age of Onset    Other Brother         hypoglycemia        REVIEW OF SYSTEMS:  Review of Systems   Constitutional: Positive for unexpected weight change. HENT: Negative. Eyes: Positive for visual disturbance. Respiratory: Negative. Cardiovascular: Negative. Gastrointestinal: Positive for constipation. Endocrine: Positive for cold intolerance. Genitourinary: Negative. Musculoskeletal: Negative. Skin: Positive for wound. Allergic/Immunologic: Negative. Neurological: Positive for tremors. Hematological: Bruises/bleeds easily. Psychiatric/Behavioral: Positive for agitation and decreased concentration. The patient is nervous/anxious and is hyperactive. PHYSICAL EXAM:  VITALS:  BP (!) 142/88   Wt 80 lb 6.4 oz (36.5 kg)   BMI 13.38 kg/m²   CONSTITUTIONAL:  alert, no apparent distress and thin  EYES:  sclera clear  ENT:  normocepalic, without obvious abnormality  NECK:  supple, symmetrical, trachea midline and no carotid bruits, no adenopathy  LUNGS:  clear to auscultation  CARDIOVASCULAR:  regular rate and rhythm and no murmur noted  ABDOMEN:  scars noted right upper quadrant, normal bowel sounds, soft, non-distended, non-tender, voluntary guarding absent, no masses palpated, no hepatosplenomegally and hernia absent  MUSCULOSKELETAL:  0+ pitting edema lower extremities  NEUROLOGIC:  Mental Status Exam:  Level of Alertness:   awake  Orientation:   person, place, time  SKIN:  no bruising or bleeding, normal skin color, texture, turgor and no redness, warmth, or swelling       LABS:   Ref.  Range 2/24/2021 09:11 5/21/2021 08:50   Sodium Latest Ref Range: 136 - 145 mmol/L 133 (L) 136 Potassium Latest Ref Range: 3.5 - 5.1 mmol/L 4.3 3.9   Chloride Latest Ref Range: 99 - 110 mmol/L 98 (L) 101   CO2 Latest Ref Range: 21 - 32 mmol/L 26 24   BUN Latest Ref Range: 7 - 20 mg/dL 19 36 (H)   Creatinine Latest Ref Range: 0.6 - 1.2 mg/dL 1.0 1.1   Anion Gap Latest Ref Range: 3 - 16  9 11   GFR Non- Latest Ref Range: >60  53 (A) 48 (A)   GFR  Latest Ref Range: >60  >60 58 (A)   Glucose Latest Ref Range: 70 - 99 mg/dL 138 (H) 150 (H)   Calcium Latest Ref Range: 8.3 - 10.6 mg/dL 9.7 9.0   Total Protein Latest Ref Range: 6.4 - 8.2 g/dL 7.0 6.9   Cholesterol, Total Latest Ref Range: 0 - 199 mg/dL  224 (H)   HDL Cholesterol Latest Ref Range: 40 - 60 mg/dL  89 (H)   LDL Calculated Latest Ref Range: <100 mg/dL  124 (H)   Triglycerides Latest Ref Range: 0 - 150 mg/dL  55   VLDL Cholesterol Calculated Latest Ref Range: Not Established mg/dL  11   Albumin Latest Ref Range: 3.4 - 5.0 g/dL 4.1 4.3   Globulin Latest Units: g/dL 2.9 2.6   Albumin/Globulin Ratio Latest Ref Range: 1.1 - 2.2  1.4 1.7   Alk Phos Latest Ref Range: 40 - 129 U/L 71 74   ALT Latest Ref Range: 10 - 40 U/L 14 13   AST Latest Ref Range: 15 - 37 U/L 21 21   Bilirubin Latest Ref Range: 0.0 - 1.0 mg/dL 0.9 0.6   Hemoglobin A1C Latest Ref Range: See comment %  7.6   eAG (mg/dL) Latest Units: mg/dL  171.4   C-Peptide Latest Ref Range: 1.1 - 4.4 ng/mL  1.8   TSH Latest Ref Range: 0.27 - 4.20 uIU/mL  2.52   WBC Latest Ref Range: 4.0 - 11.0 K/uL 3.9 (L)    RBC Latest Ref Range: 4.00 - 5.20 M/uL 4.38    Hemoglobin Quant Latest Ref Range: 12.0 - 16.0 g/dL 12.7    Hematocrit Latest Ref Range: 36.0 - 48.0 % 37.5    MCV Latest Ref Range: 80.0 - 100.0 fL 85.7    MCH Latest Ref Range: 26.0 - 34.0 pg 29.0    MCHC Latest Ref Range: 31.0 - 36.0 g/dL 33.9    MPV Latest Ref Range: 5.0 - 10.5 fL 10.4    RDW Latest Ref Range: 12.4 - 15.4 % 14.8    Platelet Count Latest Ref Range: 135 - 450 K/uL 164            PATHOLOGY  FINAL DIAGNOSIS:

## 2021-08-05 NOTE — PROGRESS NOTES
Name_______________________________________Printed:____________________  Date and time of surgery___8/25/21 @ 1230_____________________Arrival Time:___1100 [facility] hosp_____________   1. The instructions given regarding when and if a patient needs to stop oral intake prior to surgery varies. Follow the specific instructions you were given                  _x__Nothing to eat or to drink after Midnight the night before.                   ____Carbo loading or ERAS instructions will be given to select patients-if you have been given those instructions -please do the following                           The evening before your surgery after dinner before midnight drink 40 ounces of gatorade. If you are diabetic use sugar free. The morning of surgery drink 40 ounces of water. This needs to be finished 3 hours prior to your surgery start time. 2. Take the following pills with a small sip of water on the morning of surgery______none_____________________________________________                  Do not take blood pressure medications ending in pril or sartan the allen prior to surgery or the morning of surgery_   3. Aspirin, Ibuprofen, Advil, Naproxen, Vitamin E and other Anti-inflammatory products and supplements should be stopped for 5 -7days before surgery or as directed by your physician. 4. Check with your Doctor regarding stopping Plavix, Coumadin,Eliquis, Lovenox,Effient,Pradaxa,Xarelto, Fragmin or other blood thinners and follow their instructions. 5. Do not smoke, and do not drink any alcoholic beverages 24 hours prior to surgery. This includes NA Beer. Refrain from the usage of any recreational drugs. 6. You may brush your teeth and gargle the morning of surgery. DO NOT SWALLOW WATER   7. You MUST make arrangements for a responsible adult to stay on site while you are here and take you home after your surgery. You will not be allowed to leave alone or drive yourself home.   It is strongly suggested someone stay with you the first 24 hrs. Your surgery will be cancelled if you do not have a ride home. 8. A parent/legal guardian must accompany a child scheduled for surgery and plan to stay at the hospital until the child is discharged. Please do not bring other children with you. 9. Please wear simple, loose fitting clothing to the hospital.  Karli Abraham not bring valuables (money, credit cards, checkbooks, etc.) Do not wear any makeup (including no eye makeup) or nail polish on your fingers or toes. 10. DO NOT wear any jewelry or piercings on day of surgery. All body piercing jewelry must be removed. 11. If you have ___dentures, they will be removed before going to the OR; we will provide you a container. If you wear ___contact lenses or ___glasses, they will be removed; please bring a case for them. 12. Please see your family doctor/pediatrician for a history & physical and/or concerning medications. Bring any test results/reports from your physician's office. PCP____desai____________Phone___________H&P Appt. Date___8/23_____             13 If you  have a Living Will and Durable Power of  for Healthcare, please bring in a copy. 15. Notify your Surgeon if you develop any illness between now and surgery  time, cough, cold, fever, sore throat, nausea, vomiting, etc.  Please notify your surgeon if you experience dizziness, shortness of breath or blurred vision between now & the time of your surgery             15. DO NOT shave your operative site 96 hours prior to surgery. For face & neck surgery, men may use an electric razor 48 hours prior to surgery. 16. Shower the night before or morning of surgery using an antibacterial soap or as you have been instructed. 17. To provide excellent care visitors will be limited to one in the room at any given time. 18.  Please bring picture ID and insurance card.              19.  Visit our web site for additional information:  [email or web address]              20.During flu season no children under the age of 15 are permitted in the hospital for the safety of all patients. 21. If you take a long acting insulin in the evening only  take half of your usual  dose the night  before your procedure              22. If you use a c-pap please bring DOS if staying overnight,             23.For your convenience [facility] has a pharmacy on site to fill your prescriptions. 24. If you use oxygen and have a portable tank please bring it  with you the DOS             25. Bring a complete list of all your medications with name and dose include any supplements. 26. Other__________________________________________   *Please call pre admission testing if you any further questions   Trae Rod   [address] [place] [address]. Airy  [phone]   [facility]    _x__ Vaccinated-patient instructed to bring card    ___ Covid test to be done 3-5 days prior to scheduled surgery if not vaccinated-patient aware they are REQUIRED to bring a copy of the negative result DOS-if they receive a positive result to notify their surgeon         If known - indicate where patient is getting covid test done ___________________________________________________________    ___ [facility] - DOS    ___ Other___________________________________      Lorie Manger POLICY(subject to change)    There is a one visitor policy at [address] [place] [address] for all surgeries and endoscopies. Whether the visitor can stay or will be asked to wait in the car will depend on the current policy and if social distancing can be maintained. The policy is subject to change at any time. Please make sure the visitor has a cell phone that is on,charged and able to accept calls, as this may be the way that the staff communicates with them. Pain management is NO VISITOR policyThe patients ride is expected to remain in the car with a cell phone for communication. If the ride is leaving the hospital grounds please make sure they are back in time for pickup. Have the patient inform the staff on arrival what their rides plans are while the patient is in the facility. At the MAIN there is one visitor allowed. Please note that the visitor policy is subject to change. All above information reviewed with patient in person or by phone. Patient verbalizes understanding. All questions and concerns addressed.                                                                                                  Patient/Rep____pt________________                                                                                                                                    PRE OP INSTRUCTIONS

## 2021-08-10 ENCOUNTER — HOSPITAL ENCOUNTER (OUTPATIENT)
Age: 81
Discharge: HOME | End: 2021-08-10
Payer: MEDICARE

## 2021-08-10 DIAGNOSIS — Z01.818 PREOP TESTING: ICD-10-CM

## 2021-08-10 LAB
ABO/RH: NORMAL
ANION GAP SERPL CALCULATED.3IONS-SCNC: 13 MMOL/L (ref 3–16)
ANTIBODY SCREEN: NORMAL
BUN BLDV-MCNC: 27 MG/DL (ref 7–20)
CALCIUM SERPL-MCNC: 9.2 MG/DL (ref 8.3–10.6)
CHLORIDE BLD-SCNC: 104 MMOL/L (ref 99–110)
CO2: 22 MMOL/L (ref 21–32)
CREAT SERPL-MCNC: 0.9 MG/DL (ref 0.6–1.2)
EKG ATRIAL RATE: 58 BPM
EKG DIAGNOSIS: NORMAL
EKG P AXIS: 81 DEGREES
EKG P-R INTERVAL: 168 MS
EKG Q-T INTERVAL: 456 MS
EKG QRS DURATION: 76 MS
EKG QTC CALCULATION (BAZETT): 447 MS
EKG R AXIS: 31 DEGREES
EKG T AXIS: 92 DEGREES
EKG VENTRICULAR RATE: 58 BPM
GFR AFRICAN AMERICAN: >60
GFR NON-AFRICAN AMERICAN: 60
GLUCOSE BLD-MCNC: 165 MG/DL (ref 70–99)
HCT VFR BLD CALC: 37 % (ref 36–48)
HEMOGLOBIN: 12.6 G/DL (ref 12–16)
MCH RBC QN AUTO: 30 PG (ref 26–34)
MCHC RBC AUTO-ENTMCNC: 34.1 G/DL (ref 31–36)
MCV RBC AUTO: 88.2 FL (ref 80–100)
PDW BLD-RTO: 16.5 % (ref 12.4–15.4)
PLATELET # BLD: 151 K/UL (ref 135–450)
PMV BLD AUTO: 9.3 FL (ref 5–10.5)
POTASSIUM SERPL-SCNC: 4.2 MMOL/L (ref 3.5–5.1)
RBC # BLD: 4.2 M/UL (ref 4–5.2)
SODIUM BLD-SCNC: 139 MMOL/L (ref 136–145)
WBC # BLD: 4.1 K/UL (ref 4–11)

## 2021-08-10 PROCEDURE — 86850 RBC ANTIBODY SCREEN: CPT

## 2021-08-10 PROCEDURE — 93010 ELECTROCARDIOGRAM REPORT: CPT | Performed by: INTERNAL MEDICINE

## 2021-08-10 PROCEDURE — 80048 BASIC METABOLIC PNL TOTAL CA: CPT

## 2021-08-10 PROCEDURE — 36415 COLL VENOUS BLD VENIPUNCTURE: CPT

## 2021-08-10 PROCEDURE — 93005 ELECTROCARDIOGRAM TRACING: CPT | Performed by: ANESTHESIOLOGY

## 2021-08-10 PROCEDURE — 86901 BLOOD TYPING SEROLOGIC RH(D): CPT

## 2021-08-10 PROCEDURE — 86900 BLOOD TYPING SEROLOGIC ABO: CPT

## 2021-08-10 PROCEDURE — 85027 COMPLETE CBC AUTOMATED: CPT

## 2021-08-23 PROBLEM — E43 SEVERE PROTEIN-CALORIE MALNUTRITION (HCC): Status: ACTIVE | Noted: 2021-08-23

## 2021-08-23 PROBLEM — D12.6 TUBULAR ADENOMA OF COLON: Status: ACTIVE | Noted: 2021-08-23

## 2021-08-23 PROBLEM — E13.9 DIABETES MELLITUS TYPE 1.5, MANAGED AS TYPE 2 (HCC): Status: ACTIVE | Noted: 2021-08-23

## 2021-08-25 ENCOUNTER — ANESTHESIA (OUTPATIENT)
Dept: OPERATING ROOM | Age: 81
DRG: 329 | End: 2021-08-25
Payer: MEDICARE

## 2021-08-25 ENCOUNTER — ANESTHESIA EVENT (OUTPATIENT)
Dept: OPERATING ROOM | Age: 81
DRG: 329 | End: 2021-08-25
Payer: MEDICARE

## 2021-08-25 ENCOUNTER — HOSPITAL ENCOUNTER (INPATIENT)
Age: 81
LOS: 2 days | Discharge: HOME | DRG: 329 | End: 2021-08-27
Attending: SURGERY | Admitting: SURGERY
Payer: MEDICARE

## 2021-08-25 VITALS
DIASTOLIC BLOOD PRESSURE: 58 MMHG | SYSTOLIC BLOOD PRESSURE: 127 MMHG | TEMPERATURE: 95.7 F | OXYGEN SATURATION: 100 % | RESPIRATION RATE: 1 BRPM

## 2021-08-25 DIAGNOSIS — D12.3 ADENOMA OF TRANSVERSE COLON: ICD-10-CM

## 2021-08-25 DIAGNOSIS — Z01.818 PREOP TESTING: Primary | ICD-10-CM

## 2021-08-25 LAB
ABO/RH: NORMAL
ANTIBODY SCREEN: NORMAL
GLUCOSE BLD-MCNC: 175 MG/DL (ref 70–99)
GLUCOSE BLD-MCNC: 231 MG/DL (ref 70–99)
GLUCOSE BLD-MCNC: 242 MG/DL (ref 70–99)
PERFORMED ON: ABNORMAL

## 2021-08-25 PROCEDURE — 86850 RBC ANTIBODY SCREEN: CPT

## 2021-08-25 PROCEDURE — 3600000004 HC SURGERY LEVEL 4 BASE: Performed by: SURGERY

## 2021-08-25 PROCEDURE — 86900 BLOOD TYPING SEROLOGIC ABO: CPT

## 2021-08-25 PROCEDURE — 2500000003 HC RX 250 WO HCPCS: Performed by: SURGERY

## 2021-08-25 PROCEDURE — 2580000003 HC RX 258: Performed by: NURSE ANESTHETIST, CERTIFIED REGISTERED

## 2021-08-25 PROCEDURE — 6370000000 HC RX 637 (ALT 250 FOR IP): Performed by: NURSE ANESTHETIST, CERTIFIED REGISTERED

## 2021-08-25 PROCEDURE — 0DTL0ZZ RESECTION OF TRANSVERSE COLON, OPEN APPROACH: ICD-10-PCS | Performed by: SURGERY

## 2021-08-25 PROCEDURE — 3700000001 HC ADD 15 MINUTES (ANESTHESIA): Performed by: SURGERY

## 2021-08-25 PROCEDURE — 2500000003 HC RX 250 WO HCPCS: Performed by: NURSE ANESTHETIST, CERTIFIED REGISTERED

## 2021-08-25 PROCEDURE — 6360000002 HC RX W HCPCS: Performed by: NURSE ANESTHETIST, CERTIFIED REGISTERED

## 2021-08-25 PROCEDURE — 44213 LAP MOBIL SPLENIC FL ADD-ON: CPT | Performed by: SURGERY

## 2021-08-25 PROCEDURE — 2709999900 HC NON-CHARGEABLE SUPPLY: Performed by: SURGERY

## 2021-08-25 PROCEDURE — 6370000000 HC RX 637 (ALT 250 FOR IP): Performed by: SURGERY

## 2021-08-25 PROCEDURE — 3700000000 HC ANESTHESIA ATTENDED CARE: Performed by: SURGERY

## 2021-08-25 PROCEDURE — 6370000000 HC RX 637 (ALT 250 FOR IP)

## 2021-08-25 PROCEDURE — 2580000003 HC RX 258: Performed by: SURGERY

## 2021-08-25 PROCEDURE — 3600000014 HC SURGERY LEVEL 4 ADDTL 15MIN: Performed by: SURGERY

## 2021-08-25 PROCEDURE — 88307 TISSUE EXAM BY PATHOLOGIST: CPT

## 2021-08-25 PROCEDURE — 36415 COLL VENOUS BLD VENIPUNCTURE: CPT

## 2021-08-25 PROCEDURE — 1200000000 HC SEMI PRIVATE

## 2021-08-25 PROCEDURE — 7100000000 HC PACU RECOVERY - FIRST 15 MIN: Performed by: SURGERY

## 2021-08-25 PROCEDURE — 44204 LAPARO PARTIAL COLECTOMY: CPT | Performed by: SURGERY

## 2021-08-25 PROCEDURE — 6360000002 HC RX W HCPCS

## 2021-08-25 PROCEDURE — P9045 ALBUMIN (HUMAN), 5%, 250 ML: HCPCS | Performed by: NURSE ANESTHETIST, CERTIFIED REGISTERED

## 2021-08-25 PROCEDURE — 7100000001 HC PACU RECOVERY - ADDTL 15 MIN: Performed by: SURGERY

## 2021-08-25 PROCEDURE — 2720000010 HC SURG SUPPLY STERILE: Performed by: SURGERY

## 2021-08-25 PROCEDURE — 86901 BLOOD TYPING SEROLOGIC RH(D): CPT

## 2021-08-25 PROCEDURE — 6360000002 HC RX W HCPCS: Performed by: SURGERY

## 2021-08-25 RX ORDER — SODIUM CHLORIDE, SODIUM LACTATE, POTASSIUM CHLORIDE, AND CALCIUM CHLORIDE .6; .31; .03; .02 G/100ML; G/100ML; G/100ML; G/100ML
IRRIGANT IRRIGATION
Status: COMPLETED | OUTPATIENT
Start: 2021-08-25 | End: 2021-08-25

## 2021-08-25 RX ORDER — ALBUTEROL SULFATE 90 UG/1
AEROSOL, METERED RESPIRATORY (INHALATION) PRN
Status: DISCONTINUED | OUTPATIENT
Start: 2021-08-25 | End: 2021-08-25 | Stop reason: SDUPTHER

## 2021-08-25 RX ORDER — ACETAMINOPHEN 500 MG
1000 TABLET ORAL EVERY 8 HOURS SCHEDULED
Status: DISCONTINUED | OUTPATIENT
Start: 2021-08-25 | End: 2021-08-25

## 2021-08-25 RX ORDER — CIPROFLOXACIN 2 MG/ML
400 INJECTION, SOLUTION INTRAVENOUS EVERY 12 HOURS
Status: COMPLETED | OUTPATIENT
Start: 2021-08-26 | End: 2021-08-26

## 2021-08-25 RX ORDER — ONDANSETRON 2 MG/ML
INJECTION INTRAMUSCULAR; INTRAVENOUS PRN
Status: DISCONTINUED | OUTPATIENT
Start: 2021-08-25 | End: 2021-08-25 | Stop reason: SDUPTHER

## 2021-08-25 RX ORDER — ESMOLOL HYDROCHLORIDE 10 MG/ML
INJECTION INTRAVENOUS PRN
Status: DISCONTINUED | OUTPATIENT
Start: 2021-08-25 | End: 2021-08-25 | Stop reason: SDUPTHER

## 2021-08-25 RX ORDER — BUPIVACAINE HYDROCHLORIDE 5 MG/ML
INJECTION, SOLUTION EPIDURAL; INTRACAUDAL
Status: COMPLETED | OUTPATIENT
Start: 2021-08-25 | End: 2021-08-25

## 2021-08-25 RX ORDER — CIPROFLOXACIN 2 MG/ML
400 INJECTION, SOLUTION INTRAVENOUS
Status: COMPLETED | OUTPATIENT
Start: 2021-08-25 | End: 2021-08-25

## 2021-08-25 RX ORDER — FENTANYL CITRATE 50 UG/ML
50 INJECTION, SOLUTION INTRAMUSCULAR; INTRAVENOUS EVERY 5 MIN PRN
Status: DISCONTINUED | OUTPATIENT
Start: 2021-08-25 | End: 2021-08-25 | Stop reason: HOSPADM

## 2021-08-25 RX ORDER — DEXTROSE MONOHYDRATE 50 MG/ML
100 INJECTION, SOLUTION INTRAVENOUS PRN
Status: DISCONTINUED | OUTPATIENT
Start: 2021-08-25 | End: 2021-08-27 | Stop reason: HOSPADM

## 2021-08-25 RX ORDER — DEXTROSE MONOHYDRATE 25 G/50ML
12.5 INJECTION, SOLUTION INTRAVENOUS PRN
Status: DISCONTINUED | OUTPATIENT
Start: 2021-08-25 | End: 2021-08-27 | Stop reason: HOSPADM

## 2021-08-25 RX ORDER — ROCURONIUM BROMIDE 10 MG/ML
INJECTION, SOLUTION INTRAVENOUS PRN
Status: DISCONTINUED | OUTPATIENT
Start: 2021-08-25 | End: 2021-08-25 | Stop reason: SDUPTHER

## 2021-08-25 RX ORDER — HYDROMORPHONE HCL 110MG/55ML
0.25 PATIENT CONTROLLED ANALGESIA SYRINGE INTRAVENOUS EVERY 5 MIN PRN
Status: DISCONTINUED | OUTPATIENT
Start: 2021-08-25 | End: 2021-08-25 | Stop reason: HOSPADM

## 2021-08-25 RX ORDER — ALOGLIPTIN 6.25 MG/1
6.25 TABLET, FILM COATED ORAL DAILY
Status: DISCONTINUED | OUTPATIENT
Start: 2021-08-26 | End: 2021-08-27 | Stop reason: HOSPADM

## 2021-08-25 RX ORDER — DEXTROSE, SODIUM CHLORIDE, AND POTASSIUM CHLORIDE 5; .45; .15 G/100ML; G/100ML; G/100ML
INJECTION INTRAVENOUS CONTINUOUS
Status: DISCONTINUED | OUTPATIENT
Start: 2021-08-25 | End: 2021-08-26

## 2021-08-25 RX ORDER — FENTANYL CITRATE 50 UG/ML
INJECTION, SOLUTION INTRAMUSCULAR; INTRAVENOUS PRN
Status: DISCONTINUED | OUTPATIENT
Start: 2021-08-25 | End: 2021-08-25 | Stop reason: SDUPTHER

## 2021-08-25 RX ORDER — PROPOFOL 10 MG/ML
INJECTION, EMULSION INTRAVENOUS PRN
Status: DISCONTINUED | OUTPATIENT
Start: 2021-08-25 | End: 2021-08-25 | Stop reason: SDUPTHER

## 2021-08-25 RX ORDER — ACETAMINOPHEN 160 MG/5ML
1000 SOLUTION ORAL 3 TIMES DAILY
Status: DISCONTINUED | OUTPATIENT
Start: 2021-08-26 | End: 2021-08-27 | Stop reason: HOSPADM

## 2021-08-25 RX ORDER — METOCLOPRAMIDE HYDROCHLORIDE 5 MG/ML
5 INJECTION INTRAMUSCULAR; INTRAVENOUS EVERY 6 HOURS
Status: COMPLETED | OUTPATIENT
Start: 2021-08-25 | End: 2021-08-27

## 2021-08-25 RX ORDER — LABETALOL HYDROCHLORIDE 5 MG/ML
5 INJECTION, SOLUTION INTRAVENOUS EVERY 10 MIN PRN
Status: DISCONTINUED | OUTPATIENT
Start: 2021-08-25 | End: 2021-08-25 | Stop reason: HOSPADM

## 2021-08-25 RX ORDER — ALBUMIN, HUMAN INJ 5% 5 %
SOLUTION INTRAVENOUS PRN
Status: DISCONTINUED | OUTPATIENT
Start: 2021-08-25 | End: 2021-08-25 | Stop reason: SDUPTHER

## 2021-08-25 RX ORDER — SODIUM CHLORIDE 0.9 % (FLUSH) 0.9 %
5-40 SYRINGE (ML) INJECTION PRN
Status: DISCONTINUED | OUTPATIENT
Start: 2021-08-25 | End: 2021-08-27 | Stop reason: HOSPADM

## 2021-08-25 RX ORDER — DEXAMETHASONE SODIUM PHOSPHATE 4 MG/ML
INJECTION, SOLUTION INTRA-ARTICULAR; INTRALESIONAL; INTRAMUSCULAR; INTRAVENOUS; SOFT TISSUE PRN
Status: DISCONTINUED | OUTPATIENT
Start: 2021-08-25 | End: 2021-08-25 | Stop reason: SDUPTHER

## 2021-08-25 RX ORDER — OXYCODONE HYDROCHLORIDE 5 MG/1
5 TABLET ORAL PRN
Status: DISCONTINUED | OUTPATIENT
Start: 2021-08-25 | End: 2021-08-25 | Stop reason: HOSPADM

## 2021-08-25 RX ORDER — EPHEDRINE SULFATE/0.9% NACL/PF 50 MG/5 ML
SYRINGE (ML) INTRAVENOUS PRN
Status: DISCONTINUED | OUTPATIENT
Start: 2021-08-25 | End: 2021-08-25 | Stop reason: SDUPTHER

## 2021-08-25 RX ORDER — INSULIN LISPRO 100 [IU]/ML
0-12 INJECTION, SOLUTION INTRAVENOUS; SUBCUTANEOUS
Status: DISCONTINUED | OUTPATIENT
Start: 2021-08-25 | End: 2021-08-27 | Stop reason: HOSPADM

## 2021-08-25 RX ORDER — KETOROLAC TROMETHAMINE 15 MG/ML
15 INJECTION, SOLUTION INTRAMUSCULAR; INTRAVENOUS EVERY 6 HOURS PRN
Status: DISCONTINUED | OUTPATIENT
Start: 2021-08-25 | End: 2021-08-27 | Stop reason: HOSPADM

## 2021-08-25 RX ORDER — NICOTINE POLACRILEX 4 MG
15 LOZENGE BUCCAL PRN
Status: DISCONTINUED | OUTPATIENT
Start: 2021-08-25 | End: 2021-08-27 | Stop reason: HOSPADM

## 2021-08-25 RX ORDER — OXYCODONE HYDROCHLORIDE 5 MG/1
10 TABLET ORAL PRN
Status: DISCONTINUED | OUTPATIENT
Start: 2021-08-25 | End: 2021-08-25 | Stop reason: HOSPADM

## 2021-08-25 RX ORDER — MEPERIDINE HYDROCHLORIDE 25 MG/ML
12.5 INJECTION INTRAMUSCULAR; INTRAVENOUS; SUBCUTANEOUS EVERY 5 MIN PRN
Status: DISCONTINUED | OUTPATIENT
Start: 2021-08-25 | End: 2021-08-25 | Stop reason: HOSPADM

## 2021-08-25 RX ORDER — LIDOCAINE HYDROCHLORIDE 20 MG/ML
INJECTION, SOLUTION EPIDURAL; INFILTRATION; INTRACAUDAL; PERINEURAL PRN
Status: DISCONTINUED | OUTPATIENT
Start: 2021-08-25 | End: 2021-08-25 | Stop reason: SDUPTHER

## 2021-08-25 RX ORDER — CIPROFLOXACIN 2 MG/ML
INJECTION, SOLUTION INTRAVENOUS
Status: COMPLETED
Start: 2021-08-25 | End: 2021-08-25

## 2021-08-25 RX ORDER — GLYCOPYRROLATE 1 MG/5 ML
SYRINGE (ML) INTRAVENOUS PRN
Status: DISCONTINUED | OUTPATIENT
Start: 2021-08-25 | End: 2021-08-25 | Stop reason: SDUPTHER

## 2021-08-25 RX ORDER — HYDROMORPHONE HCL 110MG/55ML
PATIENT CONTROLLED ANALGESIA SYRINGE INTRAVENOUS PRN
Status: DISCONTINUED | OUTPATIENT
Start: 2021-08-25 | End: 2021-08-25 | Stop reason: SDUPTHER

## 2021-08-25 RX ORDER — SODIUM CHLORIDE 9 MG/ML
25 INJECTION, SOLUTION INTRAVENOUS PRN
Status: DISCONTINUED | OUTPATIENT
Start: 2021-08-25 | End: 2021-08-27 | Stop reason: HOSPADM

## 2021-08-25 RX ORDER — LIDOCAINE HYDROCHLORIDE 40 MG/ML
SOLUTION TOPICAL PRN
Status: DISCONTINUED | OUTPATIENT
Start: 2021-08-25 | End: 2021-08-25 | Stop reason: SDUPTHER

## 2021-08-25 RX ORDER — MORPHINE SULFATE 2 MG/ML
2 INJECTION, SOLUTION INTRAMUSCULAR; INTRAVENOUS
Status: DISCONTINUED | OUTPATIENT
Start: 2021-08-25 | End: 2021-08-27 | Stop reason: HOSPADM

## 2021-08-25 RX ORDER — HYDROMORPHONE HCL 110MG/55ML
0.5 PATIENT CONTROLLED ANALGESIA SYRINGE INTRAVENOUS EVERY 5 MIN PRN
Status: DISCONTINUED | OUTPATIENT
Start: 2021-08-25 | End: 2021-08-25 | Stop reason: HOSPADM

## 2021-08-25 RX ORDER — SUCCINYLCHOLINE/SOD CL,ISO/PF 200MG/10ML
SYRINGE (ML) INTRAVENOUS PRN
Status: DISCONTINUED | OUTPATIENT
Start: 2021-08-25 | End: 2021-08-25 | Stop reason: SDUPTHER

## 2021-08-25 RX ORDER — MAGNESIUM HYDROXIDE 1200 MG/15ML
LIQUID ORAL CONTINUOUS PRN
Status: COMPLETED | OUTPATIENT
Start: 2021-08-25 | End: 2021-08-25

## 2021-08-25 RX ORDER — PROMETHAZINE HYDROCHLORIDE 25 MG/ML
6.25 INJECTION, SOLUTION INTRAMUSCULAR; INTRAVENOUS PRN
Status: DISCONTINUED | OUTPATIENT
Start: 2021-08-25 | End: 2021-08-25 | Stop reason: HOSPADM

## 2021-08-25 RX ORDER — ACETAMINOPHEN 650 MG
TABLET, EXTENDED RELEASE ORAL
Status: COMPLETED | OUTPATIENT
Start: 2021-08-25 | End: 2021-08-25

## 2021-08-25 RX ORDER — SODIUM CHLORIDE 0.9 % (FLUSH) 0.9 %
5-40 SYRINGE (ML) INJECTION EVERY 12 HOURS SCHEDULED
Status: DISCONTINUED | OUTPATIENT
Start: 2021-08-25 | End: 2021-08-27 | Stop reason: HOSPADM

## 2021-08-25 RX ORDER — SODIUM CHLORIDE 9 MG/ML
INJECTION, SOLUTION INTRAVENOUS CONTINUOUS PRN
Status: DISCONTINUED | OUTPATIENT
Start: 2021-08-25 | End: 2021-08-25 | Stop reason: SDUPTHER

## 2021-08-25 RX ORDER — INSULIN LISPRO 100 [IU]/ML
0-6 INJECTION, SOLUTION INTRAVENOUS; SUBCUTANEOUS NIGHTLY
Status: DISCONTINUED | OUTPATIENT
Start: 2021-08-25 | End: 2021-08-27 | Stop reason: HOSPADM

## 2021-08-25 RX ORDER — TRAMADOL HYDROCHLORIDE 50 MG/1
50 TABLET ORAL EVERY 6 HOURS PRN
Status: DISCONTINUED | OUTPATIENT
Start: 2021-08-25 | End: 2021-08-27 | Stop reason: HOSPADM

## 2021-08-25 RX ORDER — LISINOPRIL 5 MG/1
5 TABLET ORAL DAILY
Status: DISCONTINUED | OUTPATIENT
Start: 2021-08-25 | End: 2021-08-27 | Stop reason: HOSPADM

## 2021-08-25 RX ORDER — ONDANSETRON 2 MG/ML
4 INJECTION INTRAMUSCULAR; INTRAVENOUS EVERY 4 HOURS PRN
Status: DISCONTINUED | OUTPATIENT
Start: 2021-08-25 | End: 2021-08-27 | Stop reason: HOSPADM

## 2021-08-25 RX ORDER — DIPHENHYDRAMINE HYDROCHLORIDE 50 MG/ML
12.5 INJECTION INTRAMUSCULAR; INTRAVENOUS
Status: DISCONTINUED | OUTPATIENT
Start: 2021-08-25 | End: 2021-08-25 | Stop reason: HOSPADM

## 2021-08-25 RX ADMIN — HYDROMORPHONE HYDROCHLORIDE 0.2 MG: 2 INJECTION, SOLUTION INTRAMUSCULAR; INTRAVENOUS; SUBCUTANEOUS at 14:08

## 2021-08-25 RX ADMIN — ACETAMINOPHEN 1000 MG: 500 TABLET ORAL at 20:15

## 2021-08-25 RX ADMIN — PHENYLEPHRINE HYDROCHLORIDE 30 MCG/MIN: 10 INJECTION INTRAVENOUS at 13:39

## 2021-08-25 RX ADMIN — LIDOCAINE HYDROCHLORIDE 40 MG: 20 INJECTION, SOLUTION EPIDURAL; INFILTRATION; INTRACAUDAL; PERINEURAL at 13:19

## 2021-08-25 RX ADMIN — POTASSIUM CHLORIDE, DEXTROSE MONOHYDRATE AND SODIUM CHLORIDE: 150; 5; 450 INJECTION, SOLUTION INTRAVENOUS at 18:46

## 2021-08-25 RX ADMIN — CIPROFLOXACIN 400 MG: 2 INJECTION, SOLUTION INTRAVENOUS at 13:34

## 2021-08-25 RX ADMIN — ROCURONIUM BROMIDE 10 MG: 10 INJECTION, SOLUTION INTRAVENOUS at 13:19

## 2021-08-25 RX ADMIN — LISINOPRIL 5 MG: 5 TABLET ORAL at 20:14

## 2021-08-25 RX ADMIN — Medication 0.1 MG: at 13:28

## 2021-08-25 RX ADMIN — HYDROMORPHONE HYDROCHLORIDE 0.2 MG: 2 INJECTION, SOLUTION INTRAMUSCULAR; INTRAVENOUS; SUBCUTANEOUS at 14:04

## 2021-08-25 RX ADMIN — ROCURONIUM BROMIDE 20 MG: 10 INJECTION, SOLUTION INTRAVENOUS at 13:30

## 2021-08-25 RX ADMIN — ALBUTEROL SULFATE 4 PUFF: 90 AEROSOL, METERED RESPIRATORY (INHALATION) at 13:24

## 2021-08-25 RX ADMIN — LIDOCAINE HYDROCHLORIDE 2 ML: 40 SOLUTION TOPICAL at 13:21

## 2021-08-25 RX ADMIN — HYDROMORPHONE HYDROCHLORIDE 0.2 MG: 2 INJECTION, SOLUTION INTRAMUSCULAR; INTRAVENOUS; SUBCUTANEOUS at 14:15

## 2021-08-25 RX ADMIN — ROCURONIUM BROMIDE 10 MG: 10 INJECTION, SOLUTION INTRAVENOUS at 14:30

## 2021-08-25 RX ADMIN — INSULIN HUMAN 5 UNITS: 100 INJECTION, SOLUTION PARENTERAL at 16:19

## 2021-08-25 RX ADMIN — ESMOLOL HYDROCHLORIDE 5 MG: 10 INJECTION, SOLUTION INTRAVENOUS at 13:56

## 2021-08-25 RX ADMIN — DEXAMETHASONE SODIUM PHOSPHATE 4 MG: 4 INJECTION, SOLUTION INTRAMUSCULAR; INTRAVENOUS at 13:30

## 2021-08-25 RX ADMIN — FENTANYL CITRATE 25 MCG: 50 INJECTION, SOLUTION INTRAMUSCULAR; INTRAVENOUS at 13:57

## 2021-08-25 RX ADMIN — ESMOLOL HYDROCHLORIDE 5 MG: 10 INJECTION, SOLUTION INTRAVENOUS at 14:05

## 2021-08-25 RX ADMIN — METRONIDAZOLE 500 MG: 500 INJECTION, SOLUTION INTRAVENOUS at 13:13

## 2021-08-25 RX ADMIN — ONDANSETRON 4 MG: 2 INJECTION INTRAMUSCULAR; INTRAVENOUS at 13:30

## 2021-08-25 RX ADMIN — SODIUM CHLORIDE: 9 INJECTION, SOLUTION INTRAVENOUS at 12:51

## 2021-08-25 RX ADMIN — METRONIDAZOLE 500 MG: 500 INJECTION, SOLUTION INTRAVENOUS at 20:18

## 2021-08-25 RX ADMIN — Medication 80 MG: at 13:20

## 2021-08-25 RX ADMIN — FENTANYL CITRATE 50 MCG: 50 INJECTION, SOLUTION INTRAMUSCULAR; INTRAVENOUS at 13:52

## 2021-08-25 RX ADMIN — FAMOTIDINE 20 MG: 10 INJECTION, SOLUTION INTRAVENOUS at 20:14

## 2021-08-25 RX ADMIN — METOCLOPRAMIDE 5 MG: 5 INJECTION, SOLUTION INTRAMUSCULAR; INTRAVENOUS at 18:50

## 2021-08-25 RX ADMIN — ALBUMIN (HUMAN) 250 ML: 12.5 INJECTION, SOLUTION INTRAVENOUS at 13:35

## 2021-08-25 RX ADMIN — SUGAMMADEX 70 MG: 100 INJECTION, SOLUTION INTRAVENOUS at 15:42

## 2021-08-25 RX ADMIN — PROPOFOL 80 MG: 10 INJECTION, EMULSION INTRAVENOUS at 13:19

## 2021-08-25 RX ADMIN — ESMOLOL HYDROCHLORIDE 5 MG: 10 INJECTION, SOLUTION INTRAVENOUS at 13:58

## 2021-08-25 RX ADMIN — ALBUMIN (HUMAN) 250 ML: 12.5 INJECTION, SOLUTION INTRAVENOUS at 13:49

## 2021-08-25 RX ADMIN — Medication 10 MG: at 13:28

## 2021-08-25 RX ADMIN — FENTANYL CITRATE 25 MCG: 50 INJECTION, SOLUTION INTRAMUSCULAR; INTRAVENOUS at 13:19

## 2021-08-25 RX ADMIN — PROPOFOL 30 MG: 10 INJECTION, EMULSION INTRAVENOUS at 14:00

## 2021-08-25 ASSESSMENT — PULMONARY FUNCTION TESTS
PIF_VALUE: 11
PIF_VALUE: 1
PIF_VALUE: 12
PIF_VALUE: 14
PIF_VALUE: 14
PIF_VALUE: 18
PIF_VALUE: 26
PIF_VALUE: 13
PIF_VALUE: 12
PIF_VALUE: 11
PIF_VALUE: 13
PIF_VALUE: 13
PIF_VALUE: 17
PIF_VALUE: 11
PIF_VALUE: 17
PIF_VALUE: 10
PIF_VALUE: 18
PIF_VALUE: 15
PIF_VALUE: 2
PIF_VALUE: 16
PIF_VALUE: 12
PIF_VALUE: 12
PIF_VALUE: 2
PIF_VALUE: 11
PIF_VALUE: 12
PIF_VALUE: 12
PIF_VALUE: 11
PIF_VALUE: 14
PIF_VALUE: 12
PIF_VALUE: 2
PIF_VALUE: 13
PIF_VALUE: 13
PIF_VALUE: 11
PIF_VALUE: 17
PIF_VALUE: 18
PIF_VALUE: 18
PIF_VALUE: 12
PIF_VALUE: 13
PIF_VALUE: 17
PIF_VALUE: 11
PIF_VALUE: 0
PIF_VALUE: 18
PIF_VALUE: 17
PIF_VALUE: 13
PIF_VALUE: 11
PIF_VALUE: 12
PIF_VALUE: 3
PIF_VALUE: 10
PIF_VALUE: 2
PIF_VALUE: 17
PIF_VALUE: 0
PIF_VALUE: 17
PIF_VALUE: 0
PIF_VALUE: 14
PIF_VALUE: 12
PIF_VALUE: 14
PIF_VALUE: 14
PIF_VALUE: 11
PIF_VALUE: 13
PIF_VALUE: 14
PIF_VALUE: 12
PIF_VALUE: 2
PIF_VALUE: 11
PIF_VALUE: 12
PIF_VALUE: 3
PIF_VALUE: 11
PIF_VALUE: 14
PIF_VALUE: 17
PIF_VALUE: 17
PIF_VALUE: 2
PIF_VALUE: 12
PIF_VALUE: 13
PIF_VALUE: 12
PIF_VALUE: 18
PIF_VALUE: 0
PIF_VALUE: 11
PIF_VALUE: 1
PIF_VALUE: 11
PIF_VALUE: 18
PIF_VALUE: 14
PIF_VALUE: 15
PIF_VALUE: 17
PIF_VALUE: 17
PIF_VALUE: 12
PIF_VALUE: 2
PIF_VALUE: 11
PIF_VALUE: 14
PIF_VALUE: 17
PIF_VALUE: 12
PIF_VALUE: 16
PIF_VALUE: 2
PIF_VALUE: 18
PIF_VALUE: 18
PIF_VALUE: 12
PIF_VALUE: 14
PIF_VALUE: 17
PIF_VALUE: 18
PIF_VALUE: 17
PIF_VALUE: 14
PIF_VALUE: 1
PIF_VALUE: 13
PIF_VALUE: 0
PIF_VALUE: 11
PIF_VALUE: 17
PIF_VALUE: 14
PIF_VALUE: 12
PIF_VALUE: 18
PIF_VALUE: 12
PIF_VALUE: 17
PIF_VALUE: 1
PIF_VALUE: 12
PIF_VALUE: 17
PIF_VALUE: 17
PIF_VALUE: 18
PIF_VALUE: 2
PIF_VALUE: 12
PIF_VALUE: 17
PIF_VALUE: 14
PIF_VALUE: 1
PIF_VALUE: 13
PIF_VALUE: 17
PIF_VALUE: 3
PIF_VALUE: 17
PIF_VALUE: 17
PIF_VALUE: 12
PIF_VALUE: 11
PIF_VALUE: 16
PIF_VALUE: 3
PIF_VALUE: 13
PIF_VALUE: 18
PIF_VALUE: 18
PIF_VALUE: 2
PIF_VALUE: 12
PIF_VALUE: 4
PIF_VALUE: 12
PIF_VALUE: 16
PIF_VALUE: 18
PIF_VALUE: 18
PIF_VALUE: 14
PIF_VALUE: 11
PIF_VALUE: 11
PIF_VALUE: 17
PIF_VALUE: 2
PIF_VALUE: 14
PIF_VALUE: 14
PIF_VALUE: 17
PIF_VALUE: 17
PIF_VALUE: 18
PIF_VALUE: 17
PIF_VALUE: 12
PIF_VALUE: 12
PIF_VALUE: 17
PIF_VALUE: 4
PIF_VALUE: 17
PIF_VALUE: 13
PIF_VALUE: 18
PIF_VALUE: 17
PIF_VALUE: 18
PIF_VALUE: 11
PIF_VALUE: 13
PIF_VALUE: 17

## 2021-08-25 ASSESSMENT — PAIN DESCRIPTION - LOCATION: LOCATION: ABDOMEN

## 2021-08-25 ASSESSMENT — PAIN DESCRIPTION - PAIN TYPE: TYPE: SURGICAL PAIN

## 2021-08-25 ASSESSMENT — PAIN SCALES - GENERAL: PAINLEVEL_OUTOF10: 3

## 2021-08-25 NOTE — H&P
[facility]      I have reviewed the history and physical and examined the patient and find no relevant changes. I have reviewed with the patient and/or family the risks, benefits, and alternatives to the procedure.     Ibis Linn MD  8/25/2021

## 2021-08-25 NOTE — ANESTHESIA POSTPROCEDURE EVALUATION
Department of Anesthesiology  Postprocedure Note    Patient: Arline Kay  MRN: [id number]  YOB: 1940  Date of evaluation: 8/25/2021  Time:  4:21 PM     Procedure Summary     Date: 08/25/21 Room / Location: [address]    Anesthesia Start: 5928 Anesthesia Stop: 7728    Procedure: LAPAROSCOPIC ASSISTED TRANSVERSE COLON RESECTION (N/A Abdomen) Diagnosis: (D12.2 ASCENDING COLON POLYP)    Surgeons: Anna Moura MD Responsible Provider: All Barber MD    Anesthesia Type: general ASA Status: 3          Anesthesia Type: general    Evert Phase I: Evert Score: 8    Evert Phase II:      Last vitals: Reviewed and per EMR flowsheets.        Anesthesia Post Evaluation    Level of consciousness: awake  Complications: no

## 2021-08-25 NOTE — PROGRESS NOTES
Phase 1 recovery completed, patient still drowsy. O2 100% on 2L. Abd soft, lap sites intact. Holding for a room.

## 2021-08-25 NOTE — BRIEF OP NOTE
Brief Postoperative Note      Patient: Rodo Marquez  YOB: 1940  MRN: [id number]    Date of Procedure: 8/25/2021    Pre-Op Diagnosis: Descending COLON POLYP    Post-Op Diagnosis: TV colon adenoma       Procedure(s):  LAPAROSCOPIC ASSISTED TRANSVERSE COLON RESECTION , SPLENIC FLEXURE MOBILIZATION    Surgeon(s):  Raffaele Banuelos MD    Assistant:  Surgical Assistant: Shayy Monsalve; [facility]    Anesthesia: General    Estimated Blood Loss (mL): Minimal    Complications: None    Specimens:   ID Type Source Tests Collected by Time Destination   A : TRANSVERSE COLON Tissue Colon SURGICAL PATHOLOGY Raffaele Banuelos MD 8/25/2021 1503        Implants:  * No implants in log *      Drains:   Urethral Catheter Non-latex 16 fr (Active)       Findings: Lesion in distal TV colon. Resection completed, wisely clear on gross inspection. Multiple small LN visible in mesentery, path pending.  Primary anastomosis completed    Electronically signed by Raffaele Banuelos MD on 8/25/2021 at 3:53 PM

## 2021-08-25 NOTE — ANESTHESIA PRE PROCEDURE
Department of Anesthesiology  Preprocedure Note       Name:  Rebecca Jordan   Age:  79 y.o.  :  1940                                          MRN:  [id number]         Date:  2021      Surgeon: Sonya Campbell):  Leobardo Kenny MD    Procedure: Procedure(s):  LAPAROSCOPIC ASSISTED DESCENDING COLON RESECTION    Medications prior to admission:   Prior to Admission medications    Medication Sig Start Date End Date Taking?  Authorizing Provider   lisinopril (PRINIVIL;ZESTRIL) 5 MG tablet Take 1 tablet by mouth daily 21   Cb Sandoval MD   SITagliptin (JANUVIA) 100 MG tablet Take 1 tablet by mouth daily  Patient taking differently: Take 50 mg by mouth daily  21   Ramos Childs MD   Annamary Balling strip Test blood sugar TID dx: e10.65 21   Ramos Childs MD   OneTouch Delica Lancets 60J MISC Test blood sugar TID dx: e10.65 21   Ramos Childs MD       Current medications:    Current Facility-Administered Medications   Medication Dose Route Frequency Provider Last Rate Last Admin    meperidine (DEMEROL) injection 12.5 mg  12.5 mg IntraVENous Q5 Min PRN Eileen Bhatia MD        HYDROmorphone (DILAUDID) injection 0.25 mg  0.25 mg IntraVENous Q5 Min PRN Eileen Bhatia MD        fentaNYL (SUBLIMAZE) injection 50 mcg  50 mcg IntraVENous Q5 Min PRN Eileen Bhatia MD        HYDROmorphone (DILAUDID) injection 0.25 mg  0.25 mg IntraVENous Q5 Min PRN Eileen Bhatia MD        HYDROmorphone (DILAUDID) injection 0.5 mg  0.5 mg IntraVENous Q5 Min PRN Eileen Bhatia MD        oxyCODONE (ROXICODONE) immediate release tablet 5 mg  5 mg Oral PRN Eileen Bhatia MD        Or    oxyCODONE (ROXICODONE) immediate release tablet 10 mg  10 mg Oral PRN Eileen Bhatia MD        promethazine (PHENERGAN) injection 6.25 mg  6.25 mg IntraVENous PRN Eileen Bhatia MD        diphenhydrAMINE (BENADRYL) injection 12.5 mg  12.5 mg IntraVENous Once PRN Eileen Bhatia MD        labetalol (NORMODYNE;TRANDATE) injection 5 mg  5 mg IntraVENous Q10 Min PRN Brigette Higuera MD           Allergies: Allergies   Allergen Reactions    Asa [Aspirin]      nausea    Codeine      dizzy    Meloxicam      Not sure of reaction    Penicillins      rash       Problem List:    Patient Active Problem List   Diagnosis Code    Essential hypertension I10    Constipation K59.00    Primary osteoarthritis of both knees M17.0    Uncontrolled type 1 diabetes mellitus with hyperglycemia ([address] E10.65    Bilateral impacted cerumen H61.23    Conductive hearing loss, bilateral H90.0    Severe protein-calorie malnutrition ([address] E43    Tubular adenoma of colon D12.6    Diabetes mellitus type 1.5, 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       Past Medical History:        Diagnosis Date    Diabetes mellitus ([facility]     Hypertension        Past Surgical History:        Procedure Laterality Date    CHOLECYSTECTOMY      COLONOSCOPY N/A 6/21/2021    COLONOSCOPY WITH BIOPSY performed by Domingo Chavez MD at [place] [address]  6/21/2021    COLONOSCOPY SUBMUCOSAL/BOTOX INJECTION performed by Domingo Chavez MD at [place] [address]  6/21/2021    COLONOSCOPY POLYPECTOMY SNARE/COLD BIOPSY performed by Domingo Chavez MD at [address] [facility] ENDOSCOPY       Social History:    Social History     Tobacco Use    Smoking status: Former Smoker     Packs/day: 0.50     Years: 10.00     Pack years: 5.00     Types: Cigarettes    Smokeless tobacco: Never Used   Substance Use Topics    Alcohol use:  No                                Counseling given: Not Answered      Vital Signs (Current):   Vitals:    08/05/21 1614   Weight: 80 lb (36.3 kg)   Height: 5' 5\" (1.651 m)                                              BP Readings from Last 3 Encounters:   08/23/21 92/60   08/03/21 (!) 142/88   06/24/21 138/80       NPO Status:                                                                                 BMI:   Wt Readings from Last 3 Encounters: 08/05/21 80 lb (36.3 kg)   08/23/21 78 lb (35.4 kg)   08/03/21 80 lb 6.4 oz (36.5 kg)     Body mass index is 13.31 kg/m². CBC:   Lab Results   Component Value Date    WBC 4.1 08/10/2021    RBC 4.20 08/10/2021    HGB 12.6 08/10/2021    HCT 37.0 08/10/2021    MCV 88.2 08/10/2021    RDW 16.5 08/10/2021     08/10/2021       CMP:   Lab Results   Component Value Date     08/10/2021    K 4.2 08/10/2021    K 5.9 03/16/2019     08/10/2021    CO2 22 08/10/2021    BUN 27 08/10/2021    CREATININE 0.9 08/10/2021    GFRAA >60 08/10/2021    GFRAA >60 05/22/2012    AGRATIO 1.7 05/21/2021    LABGLOM 60 08/10/2021    GLUCOSE 165 08/10/2021    PROT 6.9 05/21/2021    CALCIUM 9.2 08/10/2021    BILITOT 0.6 05/21/2021    ALKPHOS 74 05/21/2021    AST 21 05/21/2021    ALT 13 05/21/2021       POC Tests:   Recent Labs     08/25/21  1124   POCGLU 175*       Coags: No results found for: PROTIME, INR, APTT    HCG (If Applicable): No results found for: PREGTESTUR, PREGSERUM, HCG, HCGQUANT     ABGs: No results found for: PHART, PO2ART, YWR2KSM, KUK4NBO, BEART, W2XFMDLU     Type & Screen (If Applicable):  No results found for: LABABO, LABRH    Drug/Infectious Status (If Applicable):  No results found for: HIV, HEPCAB    COVID-19 Screening (If Applicable): No results found for: COVID19        Anesthesia Evaluation    Airway: Mallampati: II  TM distance: >3 FB   Neck ROM: full  Mouth opening: > = 3 FB Dental:          Pulmonary:                              Cardiovascular:    (+) hypertension:,         Rhythm: regular  Rate: normal                    Neuro/Psych:               GI/Hepatic/Renal:             Endo/Other:    (+) Diabetes, . Abdominal:             Vascular: Other Findings:             Anesthesia Plan      general     ASA 3       Induction: intravenous. Anesthetic plan and risks discussed with patient. Plan discussed with CRNA.                   Kristi Cox MD 8/25/2021

## 2021-08-26 LAB
ANION GAP SERPL CALCULATED.3IONS-SCNC: 11 MMOL/L (ref 3–16)
BASOPHILS ABSOLUTE: 0 K/UL (ref 0–0.2)
BASOPHILS RELATIVE PERCENT: 0.3 %
BUN BLDV-MCNC: 13 MG/DL (ref 7–20)
CALCIUM SERPL-MCNC: 8.2 MG/DL (ref 8.3–10.6)
CHLORIDE BLD-SCNC: 100 MMOL/L (ref 99–110)
CO2: 22 MMOL/L (ref 21–32)
CREAT SERPL-MCNC: 1 MG/DL (ref 0.6–1.2)
EOSINOPHILS ABSOLUTE: 0 K/UL (ref 0–0.6)
EOSINOPHILS RELATIVE PERCENT: 0 %
ESTIMATED AVERAGE GLUCOSE: 162.8 MG/DL
GFR AFRICAN AMERICAN: >60
GFR NON-AFRICAN AMERICAN: 53
GLUCOSE BLD-MCNC: 163 MG/DL (ref 70–99)
GLUCOSE BLD-MCNC: 168 MG/DL (ref 70–99)
GLUCOSE BLD-MCNC: 232 MG/DL (ref 70–99)
GLUCOSE BLD-MCNC: 337 MG/DL (ref 70–99)
GLUCOSE BLD-MCNC: 349 MG/DL (ref 70–99)
GLUCOSE BLD-MCNC: 420 MG/DL (ref 70–99)
GLUCOSE BLD-MCNC: 89 MG/DL (ref 70–99)
HBA1C MFR BLD: 7.3 %
HCT VFR BLD CALC: 31.7 % (ref 36–48)
HEMOGLOBIN: 10.8 G/DL (ref 12–16)
LYMPHOCYTES ABSOLUTE: 0.6 K/UL (ref 1–5.1)
LYMPHOCYTES RELATIVE PERCENT: 8.4 %
MCH RBC QN AUTO: 30.8 PG (ref 26–34)
MCHC RBC AUTO-ENTMCNC: 34.1 G/DL (ref 31–36)
MCV RBC AUTO: 90.2 FL (ref 80–100)
MONOCYTES ABSOLUTE: 0.9 K/UL (ref 0–1.3)
MONOCYTES RELATIVE PERCENT: 13.5 %
NEUTROPHILS ABSOLUTE: 5.2 K/UL (ref 1.7–7.7)
NEUTROPHILS RELATIVE PERCENT: 77.8 %
PDW BLD-RTO: 16.5 % (ref 12.4–15.4)
PERFORMED ON: ABNORMAL
PERFORMED ON: NORMAL
PLATELET # BLD: 114 K/UL (ref 135–450)
PMV BLD AUTO: 7.6 FL (ref 5–10.5)
POTASSIUM SERPL-SCNC: 3.7 MMOL/L (ref 3.5–5.1)
RBC # BLD: 3.52 M/UL (ref 4–5.2)
SODIUM BLD-SCNC: 133 MMOL/L (ref 136–145)
WBC # BLD: 6.7 K/UL (ref 4–11)

## 2021-08-26 PROCEDURE — 6360000002 HC RX W HCPCS: Performed by: SURGERY

## 2021-08-26 PROCEDURE — 94760 N-INVAS EAR/PLS OXIMETRY 1: CPT

## 2021-08-26 PROCEDURE — 94150 VITAL CAPACITY TEST: CPT

## 2021-08-26 PROCEDURE — 85025 COMPLETE CBC W/AUTO DIFF WBC: CPT

## 2021-08-26 PROCEDURE — 2500000003 HC RX 250 WO HCPCS: Performed by: SURGERY

## 2021-08-26 PROCEDURE — APPSS15 APP SPLIT SHARED TIME 0-15 MINUTES: Performed by: NURSE PRACTITIONER

## 2021-08-26 PROCEDURE — 99024 POSTOP FOLLOW-UP VISIT: CPT | Performed by: SURGERY

## 2021-08-26 PROCEDURE — 6370000000 HC RX 637 (ALT 250 FOR IP): Performed by: SURGERY

## 2021-08-26 PROCEDURE — 80048 BASIC METABOLIC PNL TOTAL CA: CPT

## 2021-08-26 PROCEDURE — 1200000000 HC SEMI PRIVATE

## 2021-08-26 PROCEDURE — APPNB30 APP NON BILLABLE TIME 0-30 MINS: Performed by: NURSE PRACTITIONER

## 2021-08-26 PROCEDURE — 2580000003 HC RX 258: Performed by: NURSE PRACTITIONER

## 2021-08-26 PROCEDURE — 83036 HEMOGLOBIN GLYCOSYLATED A1C: CPT

## 2021-08-26 PROCEDURE — 2580000003 HC RX 258: Performed by: SURGERY

## 2021-08-26 RX ORDER — SODIUM CHLORIDE 9 MG/ML
INJECTION, SOLUTION INTRAVENOUS CONTINUOUS
Status: DISCONTINUED | OUTPATIENT
Start: 2021-08-26 | End: 2021-08-27

## 2021-08-26 RX ADMIN — CIPROFLOXACIN 400 MG: 2 INJECTION, SOLUTION INTRAVENOUS at 00:37

## 2021-08-26 RX ADMIN — FAMOTIDINE 20 MG: 10 INJECTION, SOLUTION INTRAVENOUS at 08:25

## 2021-08-26 RX ADMIN — KETOROLAC TROMETHAMINE 15 MG: 15 INJECTION, SOLUTION INTRAMUSCULAR; INTRAVENOUS at 06:21

## 2021-08-26 RX ADMIN — METOCLOPRAMIDE 5 MG: 5 INJECTION, SOLUTION INTRAMUSCULAR; INTRAVENOUS at 21:19

## 2021-08-26 RX ADMIN — METOCLOPRAMIDE 5 MG: 5 INJECTION, SOLUTION INTRAMUSCULAR; INTRAVENOUS at 13:25

## 2021-08-26 RX ADMIN — METRONIDAZOLE 500 MG: 500 INJECTION, SOLUTION INTRAVENOUS at 06:18

## 2021-08-26 RX ADMIN — LISINOPRIL 5 MG: 5 TABLET ORAL at 08:25

## 2021-08-26 RX ADMIN — SODIUM CHLORIDE: 9 INJECTION, SOLUTION INTRAVENOUS at 11:28

## 2021-08-26 RX ADMIN — INSULIN LISPRO 12 UNITS: 100 INJECTION, SOLUTION INTRAVENOUS; SUBCUTANEOUS at 11:30

## 2021-08-26 RX ADMIN — METRONIDAZOLE 500 MG: 500 INJECTION, SOLUTION INTRAVENOUS at 15:02

## 2021-08-26 RX ADMIN — CIPROFLOXACIN 400 MG: 2 INJECTION, SOLUTION INTRAVENOUS at 13:25

## 2021-08-26 RX ADMIN — INSULIN LISPRO 8 UNITS: 100 INJECTION, SOLUTION INTRAVENOUS; SUBCUTANEOUS at 08:31

## 2021-08-26 RX ADMIN — ALOGLIPTIN 6.25 MG: 6.25 TABLET, FILM COATED ORAL at 08:25

## 2021-08-26 RX ADMIN — ACETAMINOPHEN ORAL SOLUTION 1000 MG: 650 SOLUTION ORAL at 13:25

## 2021-08-26 RX ADMIN — INSULIN LISPRO 1 UNITS: 100 INJECTION, SOLUTION INTRAVENOUS; SUBCUTANEOUS at 21:19

## 2021-08-26 RX ADMIN — METOCLOPRAMIDE 5 MG: 5 INJECTION, SOLUTION INTRAMUSCULAR; INTRAVENOUS at 06:18

## 2021-08-26 RX ADMIN — METOCLOPRAMIDE 5 MG: 5 INJECTION, SOLUTION INTRAMUSCULAR; INTRAVENOUS at 00:23

## 2021-08-26 RX ADMIN — INSULIN LISPRO 2 UNITS: 100 INJECTION, SOLUTION INTRAVENOUS; SUBCUTANEOUS at 00:22

## 2021-08-26 RX ADMIN — Medication 10 ML: at 08:29

## 2021-08-26 RX ADMIN — Medication 5 ML: at 21:36

## 2021-08-26 RX ADMIN — ENOXAPARIN SODIUM 30 MG: 30 INJECTION SUBCUTANEOUS at 08:26

## 2021-08-26 RX ADMIN — ACETAMINOPHEN ORAL SOLUTION 1000 MG: 650 SOLUTION ORAL at 08:25

## 2021-08-26 ASSESSMENT — PAIN SCALES - GENERAL
PAINLEVEL_OUTOF10: 0
PAINLEVEL_OUTOF10: 4
PAINLEVEL_OUTOF10: 4
PAINLEVEL_OUTOF10: 0
PAINLEVEL_OUTOF10: 5
PAINLEVEL_OUTOF10: 2

## 2021-08-26 ASSESSMENT — PAIN DESCRIPTION - PAIN TYPE: TYPE: SURGICAL PAIN

## 2021-08-26 ASSESSMENT — PAIN DESCRIPTION - LOCATION: LOCATION: ABDOMEN

## 2021-08-26 NOTE — PROGRESS NOTES
[facility] Laparoscopic [facility]        Progress Note    Patient Name: Staci Mendoza  MRN: [id number]  YOB: 1940  Date of Evaluation: 2021    Subjective:  No acute events overnight  Pain controlled  No nausea or vomiting, tolerating clear liquid diet  No flatus or BM, only mild bloating  Resting in bed at this time      Post-Operative Day #1    Vital Signs:  Patient Vitals for the past 24 hrs:   BP Temp Temp src Pulse Resp SpO2   21 0937 -- -- -- -- -- 92 %   21 0845 137/68 97.7 °F (36.5 °C) Oral 76 16 98 %   21 0400 (!) 153/67 98.6 °F (37 °C) Oral 83 14 99 %   21 0018 (!) 162/74 97.5 °F (36.4 °C) Oral 75 15 100 %   21 (!) 165/74 97.4 °F (36.3 °C) Oral 74 14 100 %   21 1730 (!) 146/45 -- -- 67 12 100 %   21 1715 (!) 133/40 -- -- 65 9 100 %   21 1700 (!) 137/40 -- -- 66 9 100 %   21 1645 (!) 133/43 -- -- 67 9 100 %   21 1630 (!) 138/44 -- -- 75 11 100 %   21 1620 (!) 127/40 -- -- 68 10 100 %   21 1615 (!) 133/39 97 °F (36.1 °C) Temporal 69 11 100 %   21 1610 (!) 135/41 -- -- 69 11 100 %      TEMPERATURE HISTORY 24H: Temp (24hrs), Av.6 °F (36.4 °C), Min:97 °F (36.1 °C), Max:98.6 °F (37 °C)    BLOOD PRESSURE HISTORY: Systolic (91RBT), RDM:432 , Min:74 , NHF:607    Diastolic (01QEQ), CUW:72, Min:39, Max:89      Intake/Output:  I/O last 3 completed shifts: In: 1782.6 [P.O.:480; I.V.:1002.6; IV Piggyback:300]  Out: 700 [Urine:650; Blood:50]  No intake/output data recorded.   Drain/tube Output:       Physical Exam:  General: awake, alert, oriented to  person, place, time  Lungs: unlabored respirations  Abdomen: soft, mild distention, incisional tenderness only, bowel sounds present   Skin/Wound: healing well, no drainage, no erythema, well approximated    Labs:  CBC:    Recent Labs     21  0640   WBC 6.7   HGB 10.8*   HCT 31.7*   *     BMP:    Recent Labs     21  0640   NA 133*   K 3.7      CO2 22   BUN 13   CREATININE 1.0   GLUCOSE 349*     Hepatic:  No results for input(s): AST, ALT, ALB, BILITOT, ALKPHOS in the last 72 hours. Amylase:  No results found for: AMYLASE  Lipase:  No results found for: LIPASE   Mag:    Lab Results   Component Value Date    MG 1.80 03/20/2019    MG 1.90 03/19/2019     Phos:     Lab Results   Component Value Date    PHOS 3.2 03/20/2019    PHOS 2.5 03/19/2019      Coags: No results found for: PROTIME, INR, APTT    Cultures:  Anaerobic culture  No results found for: LABANAE  Fungus stain  No results found for requested labs within last 30 days. Gram stain  No results found for requested labs within last 30 days. Organism  No results found for: [facility]  Surgical culture  No results found for: CXSURG  Blood culture 1  No results found for requested labs within last 30 days. Blood culture 2  No results found for requested labs within last 30 days. Fecal occult  No results found for requested labs within last 30 days. GI bacterial pathogens by PCR  No results found for requested labs within last 30 days. C. difficile  No results found for requested labs within last 30 days. Urine culture  No results found for: LABURIN    Pathology:  Pathology results pending     Imaging:  I have personally reviewed the following films:    No results found.     Scheduled Meds:   lisinopril  5 mg Oral Daily    alogliptin  6.25 mg Oral Daily    sodium chloride flush  5-40 mL IntraVENous 2 times per day    enoxaparin  30 mg Subcutaneous Daily    famotidine (PEPCID) injection  20 mg IntraVENous Daily    metoclopramide  5 mg IntraVENous Q6H    insulin lispro  0-12 Units Subcutaneous TID WC    insulin lispro  0-6 Units Subcutaneous Nightly    acetaminophen  1,000 mg Oral TID     Continuous Infusions:   sodium chloride 50 mL/hr at 08/26/21 1128    sodium chloride      dextrose       PRN Meds:.sodium chloride flush, sodium chloride, traMADol, morphine, ondansetron, ketorolac, glucose, dextrose, glucagon (rDNA), dextrose      Assessment:  79 y.o. female admitted with   1. Preop testing        Status-post laparoscopic-assisted transverse colon resection, laparoscopic splenic flexure mobilization, primary colocolonic anastomosis completed on 8/25/2021 for distal transverse colon adenoma  Hypertension  Diabetes      Plan:  1. Remove Tavarez catheter  2. Advance to low fiber/carb control diet as tolerated; monitor bowel function  3. IV hydration, decrease rate and switch to NS; monitor and correct electrolytes  4. Activity as tolerated, ambulate TID, up to chair for all meals  5. Pulmonary toilet, incentive spirometry  6. PRN analgesics and antiemetics--minimizing narcotics as tolerated, transition to PO  7. DVT prophylaxis with Lovenox and SCD's  8. Management of medical comorbid etiologies, home medication resumed, monitor glucose  9. Disposition: Anticipate discharge home in the next 24-48 hours    EDUCATION:  Educated patient on plan of care and disease process--all questions answered. Plans discussed with patient and nursing. Reviewed and discussed with Dr. Roseanne Leigh.       Signed:  MARY ANN Hardy - CNP  8/26/2021 4:05 PM     Surg Staff:  Pt seen and examined with NP  See full note above  Has has good recovery so far, no concerns this am, up to chair  DC tavarez  Decrease IV, and correct glc levels  Adv diet today    Jerson Branham MD

## 2021-08-26 NOTE — PROGRESS NOTES
2015:  PM shift assessment complete, see flow sheets. Medications given per orders, see MAR. Pt. A&O x4, states pain is tolerable at this time. Denies further needs. The care plan and education has been reviewed and mutually agreed upon with the patient.

## 2021-08-26 NOTE — PLAN OF CARE
Problem: Falls - Risk of:  Goal: Will remain free from falls  Description: Will remain free from falls  Outcome: Ongoing  Goal: Absence of physical injury  Description: Absence of physical injury  Outcome: Ongoing     Problem: HEMODYNAMIC STATUS  Goal: Patient has stable vital signs and fluid balance  Outcome: Ongoing     Problem: OXYGENATION/RESPIRATORY FUNCTION  Goal: Patient will achieve/maintain normal respiratory rate/effort  Outcome: Ongoing     Problem: MOBILITY  Goal: Early mobilization is achieved  Outcome: Ongoing     Problem: ELIMINATION  Goal: Elimination patterns are normal or improving  Description: Elimination patterns return to pre-surgery normal patterns  Outcome: Ongoing     Problem: SKIN INTEGRITY  Goal: Skin integrity is maintained or improved  Outcome: Ongoing

## 2021-08-26 NOTE — PROGRESS NOTES
Pt ZV=127, Dr Waterman notified, left message with Susannah Hill in office. Covered per SSI as ordered. Will monitor.

## 2021-08-26 NOTE — OP NOTE
[facility]                     [facility], [address] [place] [address]                                OPERATIVE REPORT    PATIENT NAME: Daija Tijerina                     :        1940  MED REC NO:   [id number]                          ROOM:       0030  ACCOUNT NO:   [de-identified]                           ADMIT DATE: 2021  PROVIDER:     Chao Vogt MD    DATE OF PROCEDURE:  2021    PREOPERATIVE DIAGNOSIS:  Descending colon adenoma. POSTOPERATIVE DIAGNOSIS:  Distal transverse colon adenoma. PROCEDURE:  Laparoscopic-assisted transverse colon resection,  laparoscopic splenic flexure mobilization, primary colocolonic  anastomosis completed. SURGEON:  Chao Vogt MD    ANESTHESIA  General plus local.    ESTIMATED BLOOD LOSS:  Minimal.    COMPLICATIONS:  None. DETAILS OF SURGERY:  The patient is an 69-year-old female presenting  post colonoscopy with large polyp not amendable to complete endoscopic  removal.  The patient has had adenoma noted in this site on colonoscopic  biopsy. Antibiotics were ordered, bowel preps had been done, the  intended procedure was reviewed with the patient, all questions were  answered and she consents to proceed. In terms of findings at surgery, the patient's laparoscopy was  performed. We did readily identify the tattoo of the colon which was  nicely placed just distal to the site of the adenoma. This was in the  distal half of the transverse colon. The area was resected along with  its attendant mesentery and associated omentum down to the root of the  mesentery. The splenic flexure was mobilized to an accommodate primary  anastomosis and this was done without incident. Of note, I examined the  specimen and the adenomatous lesion was large with multiple abnormal  appearing areas. It was widely clear by 6-7 cm of the margin.   The  mesentery had multiple small nodes, a few millimeters to 5 mm in size  visible. Pathology is pending at this time. The patient was stable  through procedure. ADDITIONAL DETAILS OF SURGERY:  The patient brought to the operating  room, placed on the operative table in supine position. Compression  boots were placed. Lithotomy position was established. The abdomen was  prepped and draped sterilely and time-out was done. A supraumbilical  5-mm direct entry view port was placed and the abdominal cavity was  insufflated to 15-mm mmHg pressure. Two 5-mm right abdominal ports were  placed at these sites as well. The intra-abdominal inspection was  carried out. We followed the colon up from the area of the rectosigmoid  junction through the sigmoid colon on the left side and into the  transverse colon. There was a large amount of redundancy to the sigmoid  colon. The tattooed site was visible in the distal portion of the  transverse colon but proximal to the splenic flexure. The patient had a  prior open cholecystectomy and some adhesions were present on the right  side of the abdomen from this involving the omentum and the colon, and  these were largely left alone. We began the dissection by mobilizing the left colon and  laparoscopically mobilizing the the splenic flexure. I entered the  lesser sac and  the lesser omentum between the stomach and the  colon. This was elevated and the underlying mesentery was visualized. I then took down the remainder of the splenic flexure from proximal to  distal through the distal half of the transverse colon. The omentum was  then taken off of the colon _____ for the intended proximal resection. The mesentery was elevated and taken just above the jejunal edge. The  middle colic was  at this site, rendered hemostatic with the  LigaSure and the mesentery was then resected to the colon at the  intended sites for staple transection. I then made a small 5 cm left abdominal transverse incision.   The bowel  was brought up through this incision. We examined the area of the  tattoo which was distal to the area of the lesion and then at this point  in time, we were able to staple transect the transverse colon with the  60-mm Lathrop stapler with the blue load. This was done proximally and  distally. I then took the specimen to the back table, opened the  specimen, noted the _____ widely clear margins. The tattoo site was on  the distal portion. I then changed gowns and gloves and returned at the  anastomosis. This was done in a side-to-side functional end-to-end  fashion with the Lathrop 60-mm stapler. The opening of the anastomosis  appeared widely patent and hemostatic. The open sites were brought  together and then closed with the Lathrop 60-mm stapler with the blue  load. This was then oversewn with interrupted 3-0 silk suture. The  staple lines all appeared intact and hemostatic. The crotch anastomosis  and anterior wall was also reinforced with 3-0 silk suture. The bowel  was replaced back intra-abdominally. We placed the cap on the wound  retractor and examined the area of the dissection laparoscopically  again. All appeared hemostatic. The bowel was lying nicely in proper  position with no torsion or twisting of bowel or mesentery. The  instruments and ports were removed. The port sites appeared hemostatic. The 5-mm port sites x3 were closed and the skin with 4-0 Monocryl suture  and Dermabond glue. The fascia was closed with running loop 0-PDS in  two layers for the left transverse abdominal incisional site and skin  was closed at these sites with 4-0 Monocryl suture. Dermabond glue was  placed. The patient was taken to the recovery room in stable condition  postop.         Hailee Sousa MD    D: 08/25/2021 16:28:17       T: 08/25/2021 16:33:11     [id number]  Job#: [id number]     Doc#: [id number]    CC:  Idalia Ramos MD

## 2021-08-27 VITALS
OXYGEN SATURATION: 98 % | WEIGHT: 77.4 LBS | RESPIRATION RATE: 16 BRPM | DIASTOLIC BLOOD PRESSURE: 74 MMHG | HEIGHT: 65 IN | SYSTOLIC BLOOD PRESSURE: 177 MMHG | TEMPERATURE: 97.9 F | BODY MASS INDEX: 12.9 KG/M2 | HEART RATE: 69 BPM

## 2021-08-27 PROBLEM — E43 SEVERE MALNUTRITION (HCC): Chronic | Status: ACTIVE | Noted: 2021-08-23

## 2021-08-27 LAB
GLUCOSE BLD-MCNC: 172 MG/DL (ref 70–99)
GLUCOSE BLD-MCNC: 178 MG/DL (ref 70–99)
PERFORMED ON: ABNORMAL
PERFORMED ON: ABNORMAL

## 2021-08-27 PROCEDURE — 2500000003 HC RX 250 WO HCPCS: Performed by: SURGERY

## 2021-08-27 PROCEDURE — 6360000002 HC RX W HCPCS: Performed by: SURGERY

## 2021-08-27 PROCEDURE — 6370000000 HC RX 637 (ALT 250 FOR IP): Performed by: SURGERY

## 2021-08-27 PROCEDURE — APPSS30 APP SPLIT SHARED TIME 16-30 MINUTES: Performed by: NURSE PRACTITIONER

## 2021-08-27 PROCEDURE — 2580000003 HC RX 258: Performed by: SURGERY

## 2021-08-27 PROCEDURE — APPNB30 APP NON BILLABLE TIME 0-30 MINS: Performed by: NURSE PRACTITIONER

## 2021-08-27 RX ORDER — TRAMADOL HYDROCHLORIDE 50 MG/1
50 TABLET ORAL EVERY 4 HOURS PRN
Qty: 10 TABLET | Refills: 0 | Status: SHIPPED | OUTPATIENT
Start: 2021-08-27 | End: 2021-08-30

## 2021-08-27 RX ADMIN — METOCLOPRAMIDE 5 MG: 5 INJECTION, SOLUTION INTRAMUSCULAR; INTRAVENOUS at 02:00

## 2021-08-27 RX ADMIN — ALOGLIPTIN 6.25 MG: 6.25 TABLET, FILM COATED ORAL at 07:52

## 2021-08-27 RX ADMIN — INSULIN LISPRO 2 UNITS: 100 INJECTION, SOLUTION INTRAVENOUS; SUBCUTANEOUS at 07:55

## 2021-08-27 RX ADMIN — LISINOPRIL 5 MG: 5 TABLET ORAL at 07:54

## 2021-08-27 RX ADMIN — FAMOTIDINE 20 MG: 10 INJECTION, SOLUTION INTRAVENOUS at 07:54

## 2021-08-27 RX ADMIN — Medication 10 ML: at 10:12

## 2021-08-27 RX ADMIN — ENOXAPARIN SODIUM 30 MG: 30 INJECTION SUBCUTANEOUS at 07:52

## 2021-08-27 ASSESSMENT — PAIN SCALES - GENERAL
PAINLEVEL_OUTOF10: 0

## 2021-08-27 NOTE — PROGRESS NOTES
Comprehensive Nutrition Assessment    Type and Reason for Visit:  Initial (RD triggered d/t BMI 12.88)    Nutrition Recommendations/Plan:   Offer strawberry Glucerna BID    Nutrition Assessment:  Pt is severely malnourished upon admission as evidenced by malnutrition assessment below. Pt reports 30 lb weight loss since being dx with diabetes 2 years ago. Pt reports difficulty with blood sugar control at home, however her A1C is 7.3. Pt has been consuming 10-18 grams of carb per meal in an attempt to control blood sugar. Takes Januvia at home; dose recently increased by her endocrinologist but she has not taken the higher dose yet. Suspect weight loss is d/t inadequate caloric intake with the elimination of carbohydrates. RD encouraged 45-60 grams of carb per meal with Glucerna BID and encouraged pt to speak with endocrinologist about difficulty controlling blood sugar on current medication regimen as it does not seem to be diet related. Pt currently tolerating 3 CCC/low fiber diet, consuming % of meals with no nausea/vomiting or abdominal pain. Malnutrition Assessment:  Malnutrition Status:  Severe malnutrition    Context:  Chronic Illness     Findings of the 6 clinical characteristics of malnutrition:  Energy Intake:  7 - 75% or less estimated energy requirements for 1 month or longer  Weight Loss:  Unable to assess     Body Fat Loss:  7 - Severe body fat loss Triceps   Muscle Mass Loss:  7 - Severe muscle mass loss Clavicles (pectoralis & deltoids), Scapula (trapezius)  Fluid Accumulation:  No significant fluid accumulation     Strength:  Not Performed    Estimated Daily Nutrient Needs:  Energy (kcal):  6415-1227; Weight Used for Energy Requirements:  Current (35 kg)     Protein (g):  68-86 grams; Weight Used for Protein Requirements:  Ideal (57 kg; 1.2-1.5 grams per kg)        Fluid (ml/day):   ; Method Used for Fluid Requirements:  1 ml/kcal      Nutrition Related Findings:  No edema noted.  LBM 8/26.      Wounds:  Surgical Incision       Current Nutrition Therapies:    ADULT DIET; Regular; 3 carb choices (45 gm/meal);  Low Fiber    Anthropometric Measures:  · Height: 5' 5\" (165.1 cm)  · Current Body Weight: 77 lb 6.1 oz (35.1 kg)    · Ideal Body Weight: 125 lbs; % Ideal Body Weight 61.9 %   · BMI: 12.9    · BMI Categories: Underweight (BMI less than 18.5)       Nutrition Diagnosis:   · Severe malnutrition related to inadequate protein-energy intake as evidenced by severe loss of subcutaneous fat, severe muscle loss      Nutrition Interventions:   Food and/or Nutrient Delivery:  Continue Current Diet, Start Oral Nutrition Supplement  Nutrition Education/Counseling:  Education completed ([facility] diet education)   Coordination of Nutrition Care:  Continue to monitor while inpatient    Goals:  Pt will consume at least 50% of meals and supplements       Nutrition Monitoring and Evaluation:   Behavioral-Environmental Outcomes:  None Identified   Food/Nutrient Intake Outcomes:  Food and Nutrient Intake, Supplement Intake  Physical Signs/Symptoms Outcomes:  Weight, Biochemical Data     Discharge Planning:    Continue current diet, Continue Oral Nutrition Supplement     Electronically signed by Yaron Wong RD, LUIS F on 8/27/21 at 11:35 AM EDT    Contact: [phone]

## 2021-08-27 NOTE — PLAN OF CARE
Problem: Falls - Risk of:  Goal: Will remain free from falls  Description: Will remain free from falls  Outcome: Ongoing  Goal: Absence of physical injury  Description: Absence of physical injury  Outcome: Ongoing     Problem: HEMODYNAMIC STATUS  Goal: Patient has stable vital signs and fluid balance  Outcome: Ongoing     Problem: OXYGENATION/RESPIRATORY FUNCTION  Goal: Patient will achieve/maintain normal respiratory rate/effort  Outcome: Ongoing     Problem: MOBILITY  Goal: Early mobilization is achieved  Outcome: Ongoing     Problem: ELIMINATION  Goal: Elimination patterns are normal or improving  Description: Elimination patterns return to pre-surgery normal patterns  Outcome: Ongoing     Problem: SKIN INTEGRITY  Goal: Skin integrity is maintained or improved  Outcome: Ongoing     Problem: Pain:  Goal: Pain level will decrease  Description: Pain level will decrease  Outcome: Ongoing  Goal: Control of acute pain  Description: Control of acute pain  Outcome: Ongoing  Goal: Control of chronic pain  Description: Control of chronic pain  Outcome: Ongoing

## 2021-08-27 NOTE — PROGRESS NOTES
Pt shift assessment completed. Pt is alert and orient * 4. Doesn't appear to be in pain . Pt ambulate in room with stand by assist  .Pt follows command. Pt respiration are regular and unlabored and on room air. Breath sounds clear. Fluids infusing in right AC PIV. Scheduled medications given as ordered. Patient encouraged to use call light with any needs. Patient states understanding, call light in reach, bed alarm on. All safety checks in place and will continue to monitor pt.

## 2021-08-27 NOTE — DISCHARGE SUMMARY
[address] and Laparoscopic Surgery        Discharge Summary    Patient Name: Javier Green  MRN: [id number]  YOB: 1940  PCP: Braden Colvin MD  Admission Date: 8/25/2021  Discharge Date: 8/27/2021  Disposition: home  Admitting Diagnosis: Adenoma of transverse colon [D12.3]  Discharge Diagnosis:   Patient Active Problem List   Diagnosis    Essential hypertension    Constipation    Primary osteoarthritis of both knees    Uncontrolled type 1 diabetes mellitus with hyperglycemia ([address]    Bilateral impacted cerumen    Conductive hearing loss, bilateral    Severe protein-calorie malnutrition ([address]    Tubular adenoma of colon    Diabetes mellitus type 1.5, managed as type 2 ([address]    Adenoma of transverse colon    Preop testing      Consultants: None    Procedures/Diagnostic Test(s):  No orders to display       Discharge Condition: good    HOSPITAL COURSE: Briana Marquez originally presented to the hospital on 8/25/2021 10:39 AM post-colonoscopy with large polyp not amendable to complete endoscopic removal.  The patient had adenoma noted in this site on colonoscopic biopsy. Antibiotics were ordered, bowel preps had been done, and the patient was taken to the operating room on the above date for laparoscopic-assisted transverse colon resection, laparoscopic splenic flexure mobilization, primary colocolonic anastomosis completed. Hospital course was uneventful. Surgical Pathology:  OR 8/25/2021--FINAL DIAGNOSIS:     Colon, transverse, resection:      - Segment of colon with tubulovillous adenoma (3.5cm); negative for        carcinoma.      - Resection margins negative for adenoma.      - Twelve benign lymph nodes. At time of discharge, Briana Marquez was tolerating a regular diet, had active bowel sounds, ambulating on her own accord and had adequate analgesia on oral pain medications, and had no signs of symptoms of complications.     PHYSICAL EXAMINATION:  Discharge Vitals:  height is 5' 5\" (1.651 m) and weight is 77 lb 6.4 oz (35.1 kg). Her oral temperature is 97.9 °F (36.6 °C). Her blood pressure is 177/74 (abnormal) and her pulse is 69. Her respiration is 16 and oxygen saturation is 98%. General appearance - alert, well appearing, and in no distress  Chest - clear to ausculation  Heart - normal rate and regular rhythm  Abdomen - soft, non-distended, incisional tenderness only, bowel sounds present  Neurological - motor and sensory grossly normal bilaterally  Musculoskeletal - full range of motion without pain  Extremities - peripheral pulses normal, no pedal edema, no clubbing or cyanosis  Incision: healing well, no drainage, no erythema, well approximated    LABS:  Recent Labs     08/26/21  0640   WBC 6.7   HGB 10.8*   HCT 31.7*   *   *   K 3.7      CO2 22   BUN 13   CREATININE 1.0       DISCHARGE INSTRUCTIONS:  1. Discharge medications:      Medication List      START taking these medications    traMADol 50 MG tablet  Commonly known as: Ultram  Take 1 tablet by mouth every 4 hours as needed for Pain for up to 3 days. Intended supply: 3 days. Take lowest dose possible to manage pain        CHANGE how you take these medications    SITagliptin 100 MG tablet  Commonly known as: Januvia  Take 1 tablet by mouth daily  What changed: how much to take        CONTINUE taking these medications    lisinopril 5 MG tablet  Commonly known as: PRINIVIL;ZESTRIL  Take 1 tablet by mouth daily     OneTouch Delica Lancets 01K Misc  Test blood sugar TID dx: e10.65     OneTouch Verio strip  Generic drug: blood glucose test strips  Test blood sugar TID dx: e10.65           Where to Get Your Medications      You can get these medications from any pharmacy    Bring a paper prescription for each of these medications  · traMADol 50 MG tablet       2. Diet as tolerated. 3. Activity: activity as tolerated, no driving while on analgesics and no heavy lifting for 6 weeks.   4. Wound care: keep incisions clean and dry  5. Follow-up: recommend follow up with PCP in 2-4 weeks. 6. Okay to shower, don't submerge incisions under water. 7. No driving until off pain medication and able to move torso freely without any pain. 8. Return to hospital, or contact Dr. Sebastián Grajeda' office ([phone]) if any signs of infection are seen. 9. The patient is not currently smoking. . Recommend maintaining a smoke-free lifestyle. 10. Return to Clinic: in 2 weeks. 11. Reviewed discharge instructions, restrictions, and reasons to call the office. EDUCATION:  Educated patient on plan of care and disease process--all questions answered. Plans discussed with patient and nursing. Reviewed and discussed with Dr. Sebastián Grajeda. Time spent for discharge: 30 minutes, including plan of care, patient education, and care coordination.       Signed:  MARY ANN Kovacs CNP  8/27/2021 11:04 AM

## 2021-08-27 NOTE — PLAN OF CARE
Nutrition Problem #1: Severe malnutrition  Intervention: Food and/or Nutrient Delivery: Continue Current Diet, Start Oral Nutrition Supplement  Nutritional Goals: Pt will consume at least 50% of meals and supplements

## 2021-08-27 NOTE — PLAN OF CARE
PIV removed as ordered. AVS reviewed with pt and spouse. All questions/concerns addressed. Pt transported to [facility] for spouse to . BP recheck 133/72. No acute distress.         Problem: Falls - Risk of:  Goal: Will remain free from falls  Description: Will remain free from falls  8/27/2021 1511 by Michael Wetzel RN  Outcome: Completed  8/27/2021 0524 by Aura Dahl RN  Outcome: Ongoing  Goal: Absence of physical injury  Description: Absence of physical injury  8/27/2021 1511 by Michael Wetzel RN  Outcome: Completed  8/27/2021 0524 by Aura Dahl RN  Outcome: Ongoing     Problem: HEMODYNAMIC STATUS  Goal: Patient has stable vital signs and fluid balance  8/27/2021 1511 by Michael Wetzel RN  Outcome: Completed  8/27/2021 0524 by Aura Dahl RN  Outcome: Ongoing     Problem: OXYGENATION/RESPIRATORY FUNCTION  Goal: Patient will achieve/maintain normal respiratory rate/effort  8/27/2021 1511 by Michael Wetzel RN  Outcome: Completed  8/27/2021 0524 by Aura Dahl RN  Outcome: Ongoing     Problem: MOBILITY  Goal: Early mobilization is achieved  8/27/2021 1511 by Michael Wetzel RN  Outcome: Completed  8/27/2021 0524 by Aura Dahl RN  Outcome: Ongoing     Problem: ELIMINATION  Goal: Elimination patterns are normal or improving  Description: Elimination patterns return to pre-surgery normal patterns  8/27/2021 1511 by Michael Wetzel RN  Outcome: Completed  8/27/2021 0524 by Aura Dahl RN  Outcome: Ongoing     Problem: SKIN INTEGRITY  Goal: Skin integrity is maintained or improved  8/27/2021 1511 by Michael Wetzel RN  Outcome: Completed  8/27/2021 0524 by Aura Dahl RN  Outcome: Ongoing     Problem: Pain:  Goal: Pain level will decrease  Description: Pain level will decrease  8/27/2021 1511 by Michael Wetzel RN  Outcome: Completed  8/27/2021 0524 by Aura Dahl RN  Outcome: Ongoing  Goal: Control of acute pain  Description: Control of acute pain  8/27/2021 1511 by Toni Earl RN  Outcome: Completed  8/27/2021 0524 by Lauren Lockwood RN  Outcome: Ongoing  Goal: Control of chronic pain  Description: Control of chronic pain  8/27/2021 1511 by Toni Earl RN  Outcome: Completed  8/27/2021 0524 by Lauren Lockwood RN  Outcome: Ongoing     Problem: Nutrition  Goal: Optimal nutrition therapy  8/27/2021 1511 by Toni Earl RN  Outcome: Completed  8/27/2021 1145 by Gloria Charles RD, LD  Outcome: Ongoing

## 2021-08-30 ENCOUNTER — TELEPHONE (OUTPATIENT)
Dept: ENDOCRINOLOGY | Age: 81
End: 2021-08-30

## 2021-08-30 ENCOUNTER — CARE COORDINATION (OUTPATIENT)
Dept: CASE MANAGEMENT | Age: 81
End: 2021-08-30

## 2021-08-30 NOTE — PROGRESS NOTES
Physician Progress Note      Moose Curtis  [facility] #:                  [id number]  :                       1940  ADMIT DATE:       2021 10:39 AM  DISCH DATE:        2021 2:21 PM  RESPONDING  PROVIDER #:        Sarthak Wong MD          QUERY TEXT:    Pt admitted with Distal transverse colon adenoma . Noted documentation of   Severe Malnutrition on 21. If possible, please document in progress   notes and discharge summary:    The medical record reflects the following:  Risk Factors: S/P Lap. TV Colon Resection, DM, HTN  Clinical Indicators: BMI 12.9 on admission. Per Dietitian consult note   21 \"Malnutrition Status:  Severe malnutrition  Context:  Chronic Illness  Findings of the 6 clinical characteristics of malnutrition: Energy Intake:  7   - 75% or less estimated energy requirements for 1 month or longer  Weight Loss:  Unable to assess  Body Fat Loss:  7 - Severe body fat loss   Triceps Muscle Mass Loss:  7 - Severe muscle mass loss Clavicles (pectoralis &   deltoids), Scapula (trapezius). \"  Treatment: Dietitian Consult, ivf NS, ivf D5 1/2 NS w/KCL, Diabetic Oral   Supplement, monitor labs  Options provided:  -- Severe Malnutrition confirmed present on admission  -- Severe Malnutrition confirmed not present on admission  -- Other - I will add my own diagnosis  -- Disagree - Not applicable / Not valid  -- Disagree - Clinically unable to determine / Unknown  -- Refer to Clinical Documentation Reviewer    PROVIDER RESPONSE TEXT:    The diagnosis of Severe Malnutrition was confirmed as present on admission.     Query created by: Ghulam Leiva on 2021 1:09 PM      Electronically signed by:  Sarthak Wong MD 2021 2:46 PM

## 2021-08-30 NOTE — TELEPHONE ENCOUNTER
PT is currenly take Junuva 50 mg but it is not helping. The PT would like to have a call to discuss taking 100 mg of Junuva.

## 2021-08-30 NOTE — CARE COORDINATION
[facility] Transitions Initial Follow Up Call    Call within 2 business days of discharge: Yes    Patient: Da Bowers Patient : 1940   MRN: [id number]  Reason for Admission:   Discharge Date: 21 RARS: Readmission Risk Score: 7      Last Discharge [facility]       Complaint Diagnosis Description Type Department Provider    21  Preop testing . ..  Admission (Discharged) [facility] 4T Nick Resendez MD           Initial 24 hr call attempted, contact info left on       Follow Up  Future Appointments   Date Time Provider Jazmyn Leung   10/6/2021  2:40 PM Alexander Vera MD [facility] END[facility]   2021  9:15 AM MD TEA Steven RN

## 2021-08-30 NOTE — TELEPHONE ENCOUNTER
Please advise patient that she can double the dose of Januvia and see if it improves her glucose levels, and if it does not we might have to add other medications she can send me her glucose log in 3 to 4 weeks after doubling the dose

## 2021-08-31 ENCOUNTER — CARE COORDINATION (OUTPATIENT)
Dept: CASE MANAGEMENT | Age: 81
End: 2021-08-31

## 2021-08-31 NOTE — CARE COORDINATION
[facility] Transitions Initial Follow Up Call    Call within 2 business days of discharge: Yes    Patient: Freida Gloria Patient : 1940   MRN: [id number]  Reason for Admission:   Discharge Date: 21 RARS: Readmission Risk Score: 7      Last Discharge [facility]       Complaint Diagnosis Description Type Department Provider    21  Preop testing . ..  Admission (Discharged) [facility] 4T Juliette Lin MD        2nd and final attempt at an initial 24 hour call, contact info left on          Follow Up  Future Appointments   Date Time Provider Jazmyn Leung   10/6/2021  2:40 PM Siena Rogers MD [facility] END[facility]   2021  9:15 AM Mohsen Nino MD [id number] RN

## 2021-09-07 ENCOUNTER — OFFICE VISIT (OUTPATIENT)
Dept: SURGERY | Age: 81
End: 2021-09-07

## 2021-09-07 VITALS — SYSTOLIC BLOOD PRESSURE: 142 MMHG | WEIGHT: 79.8 LBS | DIASTOLIC BLOOD PRESSURE: 88 MMHG | BODY MASS INDEX: 13.28 KG/M2

## 2021-09-07 DIAGNOSIS — Z09 POSTOP CHECK: Primary | ICD-10-CM

## 2021-09-07 PROCEDURE — 99024 POSTOP FOLLOW-UP VISIT: CPT | Performed by: SURGERY

## 2021-09-07 NOTE — PROGRESS NOTES
[facility] GENERAL AND LAPAROSCOPIC SURGERY          PATIENT NAME: Dorothye Mohs     TODAY'S DATE: 9/7/2021    SUBJECTIVE:    Pt feeling well, no concerns, up and active. Eating and having BM. No F/C. OBJECTIVE:  VITALS:  BP (!) 142/88   Wt 79 lb 12.8 oz (36.2 kg)   BMI 13.28 kg/m²     CONSTITUTIONAL:  awake and alert  LUNGS:  clear to auscultation  ABDOMEN:  normal bowel sounds, soft, non-distended, non-tender, incisions are healed     Data:  PATHOLOGY  FINAL DIAGNOSIS:     Colon, transverse, resection:      - Segment of colon with tubulovillous adenoma (3.5cm); negative for        carcinoma.      - Resection margins negative for adenoma.      - Twelve benign lymph nodes.      ASSESSMENT AND PLAN:  S/P Laparoscopic TV colectomy  Large villous adenoma, no invasive cancer  DW pt, path and surgery reviewed, all Q answered  Will see Dr. Jaleesa Fish next summer / one year for follow up scope  See me colby Azar MD

## 2021-09-17 ENCOUNTER — TELEPHONE (OUTPATIENT)
Dept: ENDOCRINOLOGY | Age: 81
End: 2021-09-17

## 2021-09-17 DIAGNOSIS — E13.9 LADA (LATENT AUTOIMMUNE DIABETES IN ADULTS), MANAGED AS TYPE 2 (HCC): Primary | ICD-10-CM

## 2021-09-17 NOTE — TELEPHONE ENCOUNTER
Pt  called in and said that pt currently takes Januvia. Pt checked her bs lastnight and got 311. She did take 2 units of Basaglar insulins after that and rechecked her sugars this morning and got 179. Pt  is requesting a callback regarding pt's sugars.

## 2021-09-18 ENCOUNTER — HOSPITAL ENCOUNTER (OUTPATIENT)
Age: 81
Discharge: HOME | End: 2021-09-18
Payer: MEDICARE

## 2021-09-18 DIAGNOSIS — E13.9 LADA (LATENT AUTOIMMUNE DIABETES IN ADULTS), MANAGED AS TYPE 2 (HCC): ICD-10-CM

## 2021-09-18 LAB
A/G RATIO: 1.6 (ref 1.1–2.2)
ALBUMIN SERPL-MCNC: 4.5 G/DL (ref 3.4–5)
ALP BLD-CCNC: 86 U/L (ref 40–129)
ALT SERPL-CCNC: 17 U/L (ref 10–40)
ANION GAP SERPL CALCULATED.3IONS-SCNC: 15 MMOL/L (ref 3–16)
AST SERPL-CCNC: 18 U/L (ref 15–37)
BILIRUB SERPL-MCNC: 1.6 MG/DL (ref 0–1)
BUN BLDV-MCNC: 29 MG/DL (ref 7–20)
CALCIUM SERPL-MCNC: 9.6 MG/DL (ref 8.3–10.6)
CHLORIDE BLD-SCNC: 100 MMOL/L (ref 99–110)
CO2: 23 MMOL/L (ref 21–32)
CREAT SERPL-MCNC: 1 MG/DL (ref 0.6–1.2)
CREATININE URINE: 75.2 MG/DL (ref 28–259)
GFR AFRICAN AMERICAN: >60
GFR NON-AFRICAN AMERICAN: 53
GLOBULIN: 2.8 G/DL
GLUCOSE BLD-MCNC: 209 MG/DL (ref 70–99)
MICROALBUMIN UR-MCNC: 1.5 MG/DL
MICROALBUMIN/CREAT UR-RTO: 19.9 MG/G (ref 0–30)
POTASSIUM SERPL-SCNC: 4.2 MMOL/L (ref 3.5–5.1)
SODIUM BLD-SCNC: 138 MMOL/L (ref 136–145)
TOTAL PROTEIN: 7.3 G/DL (ref 6.4–8.2)

## 2021-09-18 PROCEDURE — 80053 COMPREHEN METABOLIC PANEL: CPT

## 2021-09-18 PROCEDURE — 82570 ASSAY OF URINE CREATININE: CPT

## 2021-09-18 PROCEDURE — 84681 ASSAY OF C-PEPTIDE: CPT

## 2021-09-18 PROCEDURE — 36415 COLL VENOUS BLD VENIPUNCTURE: CPT

## 2021-09-18 PROCEDURE — 82043 UR ALBUMIN QUANTITATIVE: CPT

## 2021-09-20 NOTE — TELEPHONE ENCOUNTER
Called the pt to inquire the below information    Please ask pt how her fingerstick blood glucose are doing after starting insulin     Pt was not available nor could VM be left

## 2021-09-20 NOTE — TELEPHONE ENCOUNTER
Pt was advised to start basal insulin @ 6 units daily   Send glucose log   fingerstick blood glucose to be checked before each meal   Get blood work done

## 2021-09-21 LAB — C-PEPTIDE: 1.3 NG/ML (ref 1.1–4.4)

## 2021-09-21 NOTE — TELEPHONE ENCOUNTER
Pt was called and the  informed me that they started the insulin 2 days ago and her level is at 198.     The pt's  wanted to make sure she is to keep taking the 60units

## 2021-09-22 NOTE — TELEPHONE ENCOUNTER
was advised to increase the Lantus to 8 units and if the fasting blood sugars are still above 150 they will uptitrate to 10 or higher to bring the fasting glucose between 100-1 20  They will continue taking Januvia which he accidentally stopped but he will continue taking it  He would like to go back on some sort of pill rather than insulin.

## 2021-10-06 ENCOUNTER — OFFICE VISIT (OUTPATIENT)
Dept: ENDOCRINOLOGY | Age: 81
End: 2021-10-06
Payer: MEDICARE

## 2021-10-06 VITALS
SYSTOLIC BLOOD PRESSURE: 137 MMHG | DIASTOLIC BLOOD PRESSURE: 68 MMHG | RESPIRATION RATE: 16 BRPM | HEIGHT: 60 IN | BODY MASS INDEX: 15.98 KG/M2 | WEIGHT: 81.4 LBS | HEART RATE: 71 BPM

## 2021-10-06 DIAGNOSIS — E10.65 UNCONTROLLED TYPE 1 DIABETES MELLITUS WITH HYPERGLYCEMIA (HCC): Primary | ICD-10-CM

## 2021-10-06 PROCEDURE — 99214 OFFICE O/P EST MOD 30 MIN: CPT | Performed by: INTERNAL MEDICINE

## 2021-10-06 PROCEDURE — 3051F HG A1C>EQUAL 7.0%<8.0%: CPT | Performed by: INTERNAL MEDICINE

## 2021-10-06 RX ORDER — PEN NEEDLE, DIABETIC 32GX 5/32"
1 NEEDLE, DISPOSABLE MISCELLANEOUS DAILY
Qty: 100 EACH | Refills: 5 | Status: SHIPPED | OUTPATIENT
Start: 2021-10-06 | End: 2022-05-27

## 2021-10-06 RX ORDER — INSULIN GLARGINE 100 [IU]/ML
INJECTION, SOLUTION SUBCUTANEOUS
Qty: 5 PEN | Refills: 3 | Status: CANCELLED | OUTPATIENT
Start: 2021-10-06

## 2021-10-06 RX ORDER — INSULIN GLARGINE 100 [IU]/ML
INJECTION, SOLUTION SUBCUTANEOUS
Qty: 5 PEN | Refills: 5 | Status: SHIPPED | OUTPATIENT
Start: 2021-10-06

## 2021-10-06 RX ORDER — GLIPIZIDE 2.5 MG/1
2.5 TABLET, EXTENDED RELEASE ORAL DAILY
Qty: 30 TABLET | Refills: 3 | Status: SHIPPED | OUTPATIENT
Start: 2021-10-06 | End: 2021-12-02

## 2021-10-06 RX ORDER — INSULIN GLARGINE 100 [IU]/ML
INJECTION, SOLUTION SUBCUTANEOUS NIGHTLY
COMMUNITY
End: 2021-10-06 | Stop reason: SDUPTHER

## 2021-10-06 NOTE — PATIENT INSTRUCTIONS
Patient Education        Hypoglycemia: Care Instructions  Overview     Hypoglycemia means that your blood sugar is low and your body is not getting enough fuel. Some people get low blood sugar from not eating often enough. Some medicines to treat diabetes can cause low blood sugar. People who have had surgery on their stomachs or intestines may get hypoglycemia. Problems with the pancreas, kidneys, or liver also can cause low blood sugar. A snack or drink with sugar in it will raise your blood sugar and should ease your symptoms right away. Your doctor may recommend that you change or stop your medicines until you can get your blood sugar levels under control. In the long run, you may need to change your diet and eating habits so that you get enough fuel for your body throughout the day. Follow-up care is a key part of your treatment and safety. Be sure to make and go to all appointments, and call your doctor if you are having problems. It's also a good idea to know your test results and keep a list of the medicines you take. How can you care for yourself at home? · Learn your signs of low blood sugar. They are different for everyone. Some common early signs include:  ? Nausea. ? Hunger. ? Feeling nervous, irritable, or shaky. ? Cold, clammy skin. ? Sweating (when you're not exercising). · Use the \"rule of 15\" to treat low blood sugar. This includes eating 15 grams of carbohydrate from a quick-sugar food, such as 3 or 4 glucose tablets or ½ cup of juice. Wait 15 minutes and check your blood sugar. If it is still below 70 mg/dL, eat another 15 grams of carbohydrate. Repeat this every 15 minutes until your blood sugar is in a safe target range. · Once your blood sugar is in a safe range, eat a snack or meal to prevent recurrent low blood sugar. · Make sure family, friends, and coworkers know the symptoms of low blood sugar and know how to get your sugar level up.   · If you were prescribed a glucagon kit, always have it with you. Make sure friends and family know how to use it. When should you call for help? Call 911 anytime you think you may need emergency care. For example, call if:    · You passed out (lost consciousness).     · You are confused or cannot think clearly.     · Your blood sugar is very high or very low. Watch closely for changes in your health, and be sure to contact your doctor if:    · Your blood sugar stays outside the level your doctor set for you.     · You have any problems. Where can you learn more? Go to [email or web address] and sign in to your [employer] account. Enter T550 in the Headspace box to learn more about \"Hypoglycemia: Care Instructions. \"     If you do not have an account, please click on the \"Sign Up Now\" link. Current as of: August 31, 2020               Content Version: 13.0  © 2006-2021 [email or web address]. Care instructions adapted under license by [facility] ([facility]). If you have questions about a medical condition or this instruction, always ask your healthcare professional. [address] any warranty or liability for your use of this information. Patient Education        glipizide  Pronunciation:  GLIP i zide  Brand:  Glucotrol  What is the most important information I should know about glipizide? You should not use glipizide if you have diabetic ketoacidosis (call your doctor for treatment). What is glipizide? Glipizide is used together with diet and exercise to improve blood sugar control in adults with type 2 diabetes mellitus. Glipizide is not for treating type 1 diabetes. Glipizide may also be used for purposes not listed in this medication guide. What should I discuss with my healthcare provider before taking glipizide? You should not use this medicine if you are allergic to glipizide, or if you have diabetic ketoacidosis (call your doctor for treatment).   Tell your doctor if you have ever had:  · liver or kidney disease;  · chronic diarrhea, or a blockage in your intestines; or  · an enzyme deficiency called glucose-6-phosphate dehydrogenase deficiency (G6PD). Follow your doctor's instructions about using this medicine if you are pregnant or you become pregnant. Controlling diabetes is very important during pregnancy, and having high blood sugar may cause complications in both the mother and the baby. However, you may need to stop taking glipizide for a short time just before your due date. It may not be safe to breastfeed while using this medicine. Ask your doctor about any risk. How should I take glipizide? Follow all directions on your prescription label and read all medication guides or instruction sheets. Your doctor may occasionally change your dose. Use the medicine exactly as directed. Take the glipizide regular tablet 30 minutes before your first meal of the day. Take the glipizide extended-release tablet with your first meal of the day. Swallow the tablet whole and do not crush, chew, or break it. Your blood sugar will need to be checked often, and you may need other blood tests at your doctor's office. You may have low blood sugar (hypoglycemia) and feel very hungry, dizzy, irritable, confused, anxious, or shaky. To quickly treat hypoglycemia, eat or drink a fast-acting source of sugar (fruit juice, hard candy, crackers, raisins, or non-diet soda). Your doctor may prescribe a glucagon injection kit in case you have severe hypoglycemia. Be sure your family or close friends know how to give you this injection in an emergency. Also watch for signs of high blood sugar (hyperglycemia) such as increased thirst or urination. Blood sugar levels can be affected by stress, illness, surgery, exercise, alcohol use, or skipping meals. Ask your doctor before changing your dose or medication schedule. Some forms of glipizide are made with a shell that is not absorbed or melted in the body. Part of the tablet shell may appear in your stool. This is a normal side effect and will not make the medication less effective. Store at room temperature away from moisture, heat, and light. What happens if I miss a dose? Take your dose as soon as you can, but only if you are getting ready to eat a meal.  If you skip a meal, skip the missed dose and wait until your next meal. Do not take two doses at one time. Get your prescription refilled before you run out of medicine completely. What happens if I overdose? Seek emergency medical attention or call the Poison Help line at 1-785.808.7765. A glipizide overdose can cause life-threatening hypoglycemia. Symptoms of severe hypoglycemia include extreme weakness, blurred vision, sweating, trouble speaking, tremors, stomach pain, confusion, and seizure (convulsions). What should I avoid while taking glipizide? Avoid drinking alcohol. It lowers blood sugar and can cause side effects. Avoid driving or operating machinery until you know how this medicine will affect you. What are the possible side effects of glipizide? Get emergency medical help if you have signs of an allergic reaction: hives; difficulty breathing; swelling of your face, lips, tongue, or throat. Call your doctor at once if you have symptoms of low blood sugar:  · headache, irritability  · sweating, fast heart rate;  · dizziness, nausea; or  · hunger, feeling anxious or shaky. Common side effects may include:  · diarrhea, constipation, gas;  · dizziness, drowsiness;  · tremors; or  · skin rash, redness, or itching. This is not a complete list of side effects and others may occur. Call your doctor for medical advice about side effects. You may report side effects to FDA at 6-632-FDA-0483. What other drugs will affect glipizide? Sometimes it is not safe to use certain medications at the same time.  Some drugs can affect your blood levels of other drugs you take, which may increase side effects or make the medications less effective. Many drugs can affect glipizide. This includes prescription and over-the-counter medicines, vitamins, and herbal products. Not all possible interactions are listed here. Tell your doctor about all your current medicines and any medicine you start or stop using. Where can I get more information? Your pharmacist can provide more information about glipizide. Remember, keep this and all other medicines out of the reach of children, never share your medicines with others, and use this medication only for the indication prescribed. Every effort has been made to ensure that the information provided by [address] is accurate, up-to-date, and complete, but no guarantee is made to that effect. Drug information contained herein may be time sensitive. [facility] information has been compiled for use by healthcare practitioners and consumers in the [place] and therefore [facility] does not warrant that uses outside of the [place] are appropriate, unless specifically indicated otherwise. [facility]'s drug information does not endorse drugs, diagnose patients or recommend therapy. [facility] drug information is an informational resource designed to assist licensed healthcare practitioners in caring for their patients and/or to serve consumers viewing this service as a supplement to, and not a substitute for, the expertise, skill, knowledge and judgment of healthcare practitioners. The absence of a warning for a given drug or drug combination in no way should be construed to indicate that the drug or drug combination is safe, effective or appropriate for any given patient. [facility] does not assume any responsibility for any aspect of healthcare administered with the aid of information [facility] provides. The information contained herein is not intended to cover all possible uses, directions, precautions, warnings, drug interactions, allergic reactions, or adverse effects. If you have questions about the drugs you are taking, check with your doctor, nurse or pharmacist.  Copyright 7018-2319 [facility] [place]. Version: 11.02. Revision date: 3/31/2020. Care instructions adapted under license by [facility] ([facility]). If you have questions about a medical condition or this instruction, always ask your healthcare professional. [address] any warranty or liability for your use of this information.

## 2021-10-06 NOTE — PROGRESS NOTES
Nereyda Santiago is a 79 y.o. 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 Nereyda Santiago was diagnosed with Diabetes mellitus in March 2019 when she was admitted to the hospital with polyuria polydipsia and weight loss. .  At the time of diagnosis patient had polyuria polydipsia and weight loss aprox 15 lbs . She was admitted to hospital for DKA in march 2019 and that is when she was diagnosed with diabetes , her aic was 14.9 she was discharged home on basal bolus insulin. Patient has been watching her diet closely and does not eat enough due to the fear that her blood glucose are good to go elevated. At the same time she does tend to eat snacks between meals so that she does not have hypoglycemia. She denies any hypoglycemia. --On review of chart in November 2015 she had blood work done and her blood glucose was 88, in February 2017 she had a fasting glucose of 106, again in August 2018 she was noted to have a blood glucose of 106 on her Chem-7  In March 2019 when she was admitted to the hospital with a diabetic ketoacidosis her glucose was 665, sodium 125 and anion gap of 27 with a creatinine of 1.8. She had an A1c of 14.9     Comorbid conditions: none    Current diabetic medications include: lantus 2 units fasting glucose 93 --100    --- Weight loss according to the  she has been slowly losing weight in the last 5 years approximately since 2014 when she weighed around 130 to 140 pounds --denied any significant change in appetite at that time. On review of chart she had lost 16 to 17 pounds from November 2017 to March 2019  --In August 2013 she weighed 117 pounds as per office note so actual weight loss might be approximately 30 pounds since 2013 but it has been gradually. INTERIM:    Diabetes  She presents for her follow-up diabetic visit. She has type 1 diabetes mellitus.  No MedicAlert identification noted. The initial diagnosis of diabetes was made 7 months ago. Her disease course has been improving. There are no hypoglycemic associated symptoms. Associated symptoms include polyphagia and polyuria. Pertinent negatives for diabetes include no weight loss. There are no hypoglycemic complications. Symptoms are improving. There are no diabetic complications. Risk factors for coronary artery disease include hypertension. Current diabetic treatment includes insulin injections. She is compliant with treatment most of the time. Her weight is stable. She is following a generally healthy diet. Meal planning includes avoidance of concentrated sweets. She has not had a previous visit with a dietitian. She rarely participates in exercise. Her breakfast blood glucose is taken between 7-8 am. Her breakfast blood glucose range is generally  mg/dl. Her dinner blood glucose range is generally 140-180 mg/dl. An ACE inhibitor/angiotensin II receptor blocker is being taken. She sees a podiatrist.Eye exam is current.       She has been taking Januvia since May 2021   She notes worsening in her fingerstick blood glucose   She doesn't want to take insulin but had to take 8 units for the last few weeks as her glucose were running above 800 but today she again asked me to switch to any oral hypoglycemic agents  Switch to Glucotrol     Weight trend: stable  Prior visit with dietician: no  Current diet: on average, 3 meals per day  Current exercise: rare    She has hypertension and takes medication   She has been diagnosed with Arthritis and is not on meds     Past Medical History:   Diagnosis Date    Diabetes mellitus ([address]     Hypertension       Patient Active Problem List   Diagnosis    Essential hypertension    Constipation    Primary osteoarthritis of both knees    Uncontrolled type 1 diabetes mellitus with hyperglycemia ([address]    Bilateral impacted cerumen    Conductive hearing loss, bilateral    [facility]    Tubular adenoma of colon    Diabetes mellitus type 1.5, managed as type 2 ([facility]    Adenoma of transverse colon     Past Surgical History:   Procedure Laterality Date    CHOLECYSTECTOMY      COLECTOMY N/A 8/25/2021    LAPAROSCOPIC ASSISTED TRANSVERSE COLON RESECTION performed by Natalie Etienne MD at [facility] COLONOSCOPY N/A 6/21/2021    COLONOSCOPY WITH BIOPSY performed by Harley Harris MD at [facility]  6/21/2021    COLONOSCOPY SUBMUCOSAL/BOTOX INJECTION performed by Harley Harris MD at [facility]  6/21/2021    COLONOSCOPY POLYPECTOMY SNARE/COLD BIOPSY performed by Harley Harris MD at [address] History     Socioeconomic History    Marital status:      Spouse name: Not on file    Number of children: Not on file    Years of education: Not on file    Highest education level: Not on file   Occupational History    Not on file   Tobacco Use    Smoking status: Former Smoker     Packs/day: 0.50     Years: 10.00     Pack years: 5.00     Types: Cigarettes    Smokeless tobacco: Never Used   Vaping Use    Vaping Use: Never used   Substance and Sexual Activity    Alcohol use: No    Drug use: No    Sexual activity: Not on file   Other Topics Concern    Not on file   Social History Narrative    Not on file     Social Determinants of Health     Financial Resource Strain:     Difficulty of Paying Living Expenses:    Food Insecurity:     Worried About Running Out of Food in the Last Year:     Ran Out of Food in the Last Year:    Transportation Needs:     Lack of Transportation (Medical):      Lack of Transportation (Non-Medical):    Physical Activity:     Days of Exercise per Week:     Minutes of Exercise per Session:    Stress:     Feeling of Stress :    Social Connections:     Frequency of Communication with Friends and Family:     Frequency of Social Gatherings with Friends and Family:     mood, affect are normal  Skin: skin and subcutaneous tissue is normal without mass,   Head and Face: examination of head and face revealed no abnormalities  Eyes: no lid or conjunctival swelling, no erythema or discharge, pupils are normal,   Ears/Nose: external inspection of ears and nose revealed no abnormalities, hearing is grossly normal  Oropharynx/Mouth/Face: lips, tongue and gums are normal with no lesions, the voice quality was normal  Neck: neck is supple and symmetric, with midline trachea and no masses, thyroid is normal    Pulmonary: no increased work of breathing or signs of respiratory distress, lungs are clear to auscultation  Cardiovascular: normal heart rate and rhythm, normal S1 and S2,   Musculoskeletal: normal gait and station,   Neurological: normal coordination, normal general cortical function      Lab Results   Component Value Date    LABA1C 7.3 08/26/2021    LABA1C 7.6 05/21/2021    LABA1C 7.0 12/08/2020         ASSESSMENT/PLAN:  ----  Type 2 Diabetes ---ELIDA vandana antibody strongly positive C-peptide of 1.8 in October 2019>>1.3   aic 14>>6.2>>6.3>>6.9>>6.9>>7.0>>7.6>>7.3  aic worsened since last visit   She is now taking Januvia 100 mg daily   Has been taking 8 units of lantus daily fasting above 200   She still wants pills so wants to stop insulin again   She was advised to increase carbohydrate to 30 g per meal  Started Januvia in May 2021   Stopped metformin as she couldn't take the pills   She notes improvement in glucose levels     Patient was advised that s he has ending of her fingerstick blood glucose logs is crucial in management of her  diabetes. I will adjust the  doses of diabetic medications  according to sent data.      Health Maintenance       Last Eye Exam: advised to have annual dilated eye exam. her last eye exam was in 2020   Last Podiatry Exam: Sd Ledbetter worked last foot exam was 2020 she follows with podiatry every 3 months Dr Maritza Enriquez: Last LDL level was 68 in feb 2020  patient is not on any medication at the time of the blood work she did not carry the diagnosis of diabetes  Microalbumin/Creatinine Ratio: Normal in feb 2020 . Education: Reviewed ABCs of diabetes management (respective goals in parentheses): A1C (<7), blood pressure (<130/80), and cholesterol (LDL <100). Additional Education: Patient has seen diabetes Educator. 2. Essential hypertension  On medication   I am not sure about compliance with medication low yes maybe    3. Primary osteoarthritis of both knees  Pt denies any significant hypoglycemia     4. Constipation,Chronic problem     5. LOW BMI  Patient weight 117 lbs in 2013 but now weighs 88   Gradual weight loss over the years. Today again we again discussed that she needs to consume more calories and more carbohydrates and if required she can take 1 to 2 units of Humalog with the meals she seems to be fearful of the idea of taking insulin we tried to alleviate her anxiety around insulin dosing. Encouraged to work on diet and exercise and follow-up with the primary care physician for work-up of weight loss if continues          Reviewed and/or ordered clinical lab results yes   Reviewed and/or ordered radiology tests Yes  Reviewed and/or ordered other diagnostic tests yes   Made a decision to obtain old records yes   Reviewed and summarized old records yes     Lo Hall was counseled regarding symptoms of current diagnosis, course and complications of disease if inadequately treated, side effects of medications, diagnosis, treatment options, and prognosis, risks, benefits, complications, and alternatives of treatment, labs, imaging and other studies and treatment targets and goals. She understands instructions and counseling    Return in about 3 months (around 1/6/2022). Please note that some or all of this report was generated using voice recognition software.  Please notify me in case of any questions about the content of this document, as some errors in transcription may have occurred .

## 2021-10-13 ENCOUNTER — TELEPHONE (OUTPATIENT)
Dept: ENDOCRINOLOGY | Age: 81
End: 2021-10-13

## 2021-10-13 NOTE — TELEPHONE ENCOUNTER
Returned patients husbands call. He states her sugars have been running in the 300s. He did give her the Lantus 12 units 2 days ago. Spoke to Dr. Tamia Patricia and she said patient should take the Lantus 12 units every morning as well as take the Januvia and Glipizide. Patient and patients  verbalized understanding. They will follow up with the office via phone in 1 week.

## 2021-10-20 ENCOUNTER — TELEPHONE (OUTPATIENT)
Dept: ENDOCRINOLOGY | Age: 81
End: 2021-10-20

## 2021-10-20 NOTE — TELEPHONE ENCOUNTER
Pts  called in the pts bs numbers    10/17/21  125-AM  202-Noon  108-5:16pm  163-PM    10/18/21  AM-119  133-Noon  148-10:30pm    10/19/21  158-AM  92-Noon  145-Dinner  114-PM    10/20/21  114-AM  89-Noon

## 2021-10-20 NOTE — TELEPHONE ENCOUNTER
Please advise patient to continue on the current regimen hopefully they are taking insulin every day

## 2021-11-02 NOTE — TELEPHONE ENCOUNTER
Pt  calling to see if still want her on the glipizide\\ if so wanted to see if there could be a 3 month supply sent over so that way they dont need to keep calling back every 30 days.

## 2021-11-02 NOTE — TELEPHONE ENCOUNTER
Called the pt's family to inform them the patient is to stop taking the listed medication     glipiZIDE (GLUCOTROL XL) 2.5 MG extended release tablet    The family voiced understanding

## 2021-11-04 ENCOUNTER — TELEPHONE (OUTPATIENT)
Dept: ENDOCRINOLOGY | Age: 81
End: 2021-11-04

## 2021-12-01 ENCOUNTER — TELEPHONE (OUTPATIENT)
Dept: ENDOCRINOLOGY | Age: 81
End: 2021-12-01

## 2021-12-01 NOTE — TELEPHONE ENCOUNTER
Insulin to 10units she is currently taking.   Also I would like to know if she is taking oral diabetes medication and if so how much and  which one

## 2021-12-16 ENCOUNTER — HOSPITAL ENCOUNTER (OUTPATIENT)
Age: 81
Discharge: HOME | End: 2021-12-16
Payer: MEDICARE

## 2021-12-16 ENCOUNTER — TELEPHONE (OUTPATIENT)
Dept: ENDOCRINOLOGY | Age: 81
End: 2021-12-16

## 2021-12-16 DIAGNOSIS — E10.65 UNCONTROLLED TYPE 1 DIABETES MELLITUS WITH HYPERGLYCEMIA (HCC): Primary | ICD-10-CM

## 2021-12-16 DIAGNOSIS — E10.65 UNCONTROLLED TYPE 1 DIABETES MELLITUS WITH HYPERGLYCEMIA (HCC): ICD-10-CM

## 2021-12-16 LAB
A/G RATIO: 1.3 (ref 1.1–2.2)
ALBUMIN SERPL-MCNC: 4 G/DL (ref 3.4–5)
ALP BLD-CCNC: 94 U/L (ref 40–129)
ALT SERPL-CCNC: 16 U/L (ref 10–40)
ANION GAP SERPL CALCULATED.3IONS-SCNC: 15 MMOL/L (ref 3–16)
AST SERPL-CCNC: 14 U/L (ref 15–37)
BILIRUB SERPL-MCNC: 0.4 MG/DL (ref 0–1)
BUN BLDV-MCNC: 40 MG/DL (ref 7–20)
CALCIUM SERPL-MCNC: 9 MG/DL (ref 8.3–10.6)
CHLORIDE BLD-SCNC: 104 MMOL/L (ref 99–110)
CHOLESTEROL, TOTAL: 153 MG/DL (ref 0–199)
CO2: 21 MMOL/L (ref 21–32)
CREAT SERPL-MCNC: 1.2 MG/DL (ref 0.6–1.2)
CREATININE URINE: 49.8 MG/DL (ref 28–259)
ESTIMATED AVERAGE GLUCOSE: 157.1 MG/DL
GFR AFRICAN AMERICAN: 52
GFR NON-AFRICAN AMERICAN: 43
GLUCOSE BLD-MCNC: 143 MG/DL (ref 70–99)
HBA1C MFR BLD: 7.1 %
HDLC SERPL-MCNC: 87 MG/DL (ref 40–60)
LDL CHOLESTEROL CALCULATED: 57 MG/DL
MICROALBUMIN UR-MCNC: 1.5 MG/DL
MICROALBUMIN/CREAT UR-RTO: 30.1 MG/G (ref 0–30)
POTASSIUM SERPL-SCNC: 3.7 MMOL/L (ref 3.5–5.1)
SODIUM BLD-SCNC: 140 MMOL/L (ref 136–145)
TOTAL PROTEIN: 7.2 G/DL (ref 6.4–8.2)
TRIGL SERPL-MCNC: 47 MG/DL (ref 0–150)
TSH SERPL DL<=0.05 MIU/L-ACNC: 2.43 UIU/ML (ref 0.27–4.2)
VLDLC SERPL CALC-MCNC: 9 MG/DL

## 2021-12-16 PROCEDURE — 83036 HEMOGLOBIN GLYCOSYLATED A1C: CPT

## 2021-12-16 PROCEDURE — 82570 ASSAY OF URINE CREATININE: CPT

## 2021-12-16 PROCEDURE — 84443 ASSAY THYROID STIM HORMONE: CPT

## 2021-12-16 PROCEDURE — 36415 COLL VENOUS BLD VENIPUNCTURE: CPT

## 2021-12-16 PROCEDURE — 80061 LIPID PANEL: CPT

## 2021-12-16 PROCEDURE — 82043 UR ALBUMIN QUANTITATIVE: CPT

## 2021-12-16 PROCEDURE — 80053 COMPREHEN METABOLIC PANEL: CPT

## 2021-12-16 RX ORDER — INSULIN LISPRO 100 [IU]/ML
INJECTION, SOLUTION INTRAVENOUS; SUBCUTANEOUS
Qty: 5 PEN | Refills: 3 | Status: SHIPPED | OUTPATIENT
Start: 2021-12-16 | End: 2022-02-15

## 2021-12-16 NOTE — TELEPHONE ENCOUNTER
Please advise patient I reviewed her glucose logs and its like she has some high numbers before dinner so she probably needs to take 2 units of short acting insulin in addition to the long-acting insulin with lunch and dinner. I would like her to eat liberally so that she does not lose any more weight. We can always increase the amount of short-acting insulin with the meals to accommodate bigger meals eventually. I think she would benefit from seeing the diabetes educator.

## 2021-12-16 NOTE — TELEPHONE ENCOUNTER
The pt dropped off the most recent sugar logs     DM logs from 12/1-12-14/2021    Will send to MD for her to review

## 2021-12-30 ENCOUNTER — TELEPHONE (OUTPATIENT)
Dept: ENDOCRINOLOGY | Age: 81
End: 2021-12-30

## 2021-12-30 NOTE — TELEPHONE ENCOUNTER
Is she eating lunch?   Because she is not checking her fingerstick at lunch, I would like her to increase the short acting insulin that she is currently taking with each meal by 2 units

## 2021-12-30 NOTE — TELEPHONE ENCOUNTER
The pt dropped off the most recent sugar logs      DM logs from 12/15-12-28     Will send to MD for her to review

## 2021-12-30 NOTE — TELEPHONE ENCOUNTER
Patients  aware. He states if her glucose is high sometimes she will eat very light. She has not started the short acting insulin because he states the pharmacy never contacted them. I explained the prescription was sent to the pharmacy and they should follow up with the pharmacy for the prescription. Patients  verbalized understanding. ~

## 2022-01-04 ENCOUNTER — TELEPHONE (OUTPATIENT)
Dept: ENDOCRINOLOGY | Age: 82
End: 2022-01-04

## 2022-01-04 DIAGNOSIS — E10.65 UNCONTROLLED TYPE 1 DIABETES MELLITUS WITH HYPERGLYCEMIA (HCC): Primary | ICD-10-CM

## 2022-01-04 RX ORDER — INSULIN ASPART 100 [IU]/ML
INJECTION, SOLUTION INTRAVENOUS; SUBCUTANEOUS
Qty: 5 PEN | Refills: 3 | Status: SHIPPED | OUTPATIENT
Start: 2022-01-04 | End: 2022-02-15 | Stop reason: SDUPTHER

## 2022-01-04 NOTE — TELEPHONE ENCOUNTER
Pt calling about pharmacy not being able to cover the humalog\\ pt wasn't sure if they stated they would cover something else.

## 2022-01-27 ENCOUNTER — TELEPHONE (OUTPATIENT)
Dept: ENDOCRINOLOGY | Age: 82
End: 2022-01-27

## 2022-01-27 NOTE — TELEPHONE ENCOUNTER
Please advise patient she can stop using the Januvia, she can increase her long-acting insulin from 10 units to 12 units and short-acting insulin from 2 units to 3 units with each meal.

## 2022-01-27 NOTE — TELEPHONE ENCOUNTER
Please advise patient to increase her Lantus from 12 units daily to 14 units daily and how much short-acting insulin is she taking with the meals, she needs to notate that in her glucose logs.

## 2022-01-27 NOTE — TELEPHONE ENCOUNTER
The pt wanted the MD to aware that she is taking 2 units of the short acting insulin     And taking 10 units of the short acting insulin, she also wanted the MD to be aware that she is taking the Januvia 100 mg in the morning

## 2022-02-15 ENCOUNTER — OFFICE VISIT (OUTPATIENT)
Dept: ENDOCRINOLOGY | Age: 82
End: 2022-02-15
Payer: MEDICARE

## 2022-02-15 VITALS
RESPIRATION RATE: 16 BRPM | DIASTOLIC BLOOD PRESSURE: 63 MMHG | HEIGHT: 66 IN | WEIGHT: 96 LBS | BODY MASS INDEX: 15.43 KG/M2 | HEART RATE: 75 BPM | SYSTOLIC BLOOD PRESSURE: 132 MMHG

## 2022-02-15 DIAGNOSIS — E10.65 UNCONTROLLED TYPE 1 DIABETES MELLITUS WITH HYPERGLYCEMIA (HCC): ICD-10-CM

## 2022-02-15 PROCEDURE — 99214 OFFICE O/P EST MOD 30 MIN: CPT | Performed by: INTERNAL MEDICINE

## 2022-02-15 RX ORDER — INSULIN ASPART 100 [IU]/ML
INJECTION, SOLUTION INTRAVENOUS; SUBCUTANEOUS
Qty: 5 PEN | Refills: 3 | Status: SHIPPED | OUTPATIENT
Start: 2022-02-15 | End: 2022-02-21 | Stop reason: SDUPTHER

## 2022-02-15 NOTE — PROGRESS NOTES
Dariana Montalvo is a 79 y.o. 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 Dariana Montalvo was diagnosed with Diabetes mellitus in March 2019 when she was admitted to the hospital with polyuria polydipsia and weight loss. .  At the time of diagnosis patient had polyuria polydipsia and weight loss aprox 15 lbs . She was admitted to hospital for DKA in march 2019 and that is when she was diagnosed with diabetes , her aic was 14.9 she was discharged home on basal bolus insulin. Patient has been watching her diet closely and does not eat enough due to the fear that her blood glucose are good to go elevated. At the same time she does tend to eat snacks between meals so that she does not have hypoglycemia. She denies any hypoglycemia. --On review of chart in November 2015 she had blood work done and her blood glucose was 88, in February 2017 she had a fasting glucose of 106, again in August 2018 she was noted to have a blood glucose of 106 on her Chem-7  In March 2019 when she was admitted to the hospital with a diabetic ketoacidosis her glucose was 665, sodium 125 and anion gap of 27 with a creatinine of 1.8. She had an A1c of 14.9    --- Weight loss according to the  she has been slowly losing weight in the last 5 years approximately since 2014 when she weighed around 130 to 140 pounds --denied any significant change in appetite at that time. On review of chart she had lost 16 to 17 pounds from November 2017 to March 2019  --In August 2013 she weighed 117 pounds as per office note so actual weight loss might be approximately 30 pounds since 2013 but it has been gradually. INTERIM:    Diabetes  She presents for her follow-up diabetic visit. She has type 1 diabetes mellitus. No MedicAlert identification noted. The initial diagnosis of diabetes was made 7 months ago.  Her disease course has been improving. There are no hypoglycemic associated symptoms. Associated symptoms include polyphagia and polyuria. Pertinent negatives for diabetes include no weight loss. There are no hypoglycemic complications. Symptoms are improving. There are no diabetic complications. Risk factors for coronary artery disease include hypertension. Current diabetic treatment includes insulin injections. She is compliant with treatment most of the time. Her weight is stable. She is following a generally healthy diet. Meal planning includes avoidance of concentrated sweets. She has not had a previous visit with a dietitian. She rarely participates in exercise. Her breakfast blood glucose is taken between 7-8 am. Her breakfast blood glucose range is generally  mg/dl. Her dinner blood glucose range is generally 140-180 mg/dl. An ACE inhibitor/angiotensin II receptor blocker is being taken. She sees a podiatrist.Eye exam is current. Takes lantus 12 units a day   Novolog 3 units with breakfast and supper which is dinner. Currently does not take any short acting insulin with lunch which the patient calls dinner  They call lunch dinner and typically doesn't take any insulin with lunch   She has gained 15 lbs since sept 2021, since starting insulin.  She was encouraged to continue eating at least 30 to 40 g of carbs per meal.    Weight trend: stable  Prior visit with dietician: no  Current diet: on average, 3 meals per day  Current exercise: rare    She has hypertension and takes medication   She has been diagnosed with Arthritis and is not on meds     Past Medical History:   Diagnosis Date    Diabetes mellitus ([address]     Hypertension       Patient Active Problem List   Diagnosis    Essential hypertension    Constipation    Primary osteoarthritis of both knees    Uncontrolled type 1 diabetes mellitus with hyperglycemia ([address]    Bilateral impacted cerumen    Conductive hearing loss, bilateral    Severe protein-calorie malnutrition ([facility]    Tubular adenoma of colon    Diabetes mellitus type 1.5, managed as type 2 ([facility]    Adenoma of transverse colon     Past Surgical History:   Procedure Laterality Date    CHOLECYSTECTOMY      COLECTOMY N/A 8/25/2021    LAPAROSCOPIC ASSISTED TRANSVERSE COLON RESECTION performed by Tila Carreon MD at [address] COLONOSCOPY N/A 6/21/2021    COLONOSCOPY WITH BIOPSY performed by Lázaro Portillo MD at [place] [address]  6/21/2021    COLONOSCOPY SUBMUCOSAL/BOTOX INJECTION performed by Lázaro Portillo MD at [place] [address]  6/21/2021    COLONOSCOPY POLYPECTOMY SNARE/COLD BIOPSY performed by Lázaro Portillo MD at [address] History     Socioeconomic History    Marital status:      Spouse name: Not on file    Number of children: Not on file    Years of education: Not on file    Highest education level: Not on file   Occupational History    Not on file   Tobacco Use    Smoking status: Former Smoker     Packs/day: 0.50     Years: 10.00     Pack years: 5.00     Types: Cigarettes    Smokeless tobacco: Never Used   Vaping Use    Vaping Use: Never used   Substance and Sexual Activity    Alcohol use: No    Drug use: No    Sexual activity: Not on file   Other Topics Concern    Not on file   Social History Narrative    Not on file     Social Determinants of Health     Financial Resource Strain:     Difficulty of Paying Living Expenses: Not on file   Food Insecurity:     Worried About [address] in the Last Year: Not on file    Manuel of Food in the Last Year: Not on file   Transportation Needs:     Lack of Transportation (Medical): Not on file    Lack of Transportation (Non-Medical):  Not on file   Physical Activity:     Days of Exercise per Week: Not on file    Minutes of Exercise per Session: Not on file   Stress:     Feeling of Stress : Not on file   Social Connections:     Frequency of Communication with Friends and Family: Not on file    Frequency of Social Gatherings with Friends and Family: Not on file    Attends [employer] Services: Not on file    Active Member of Clubs or Organizations: Not on file    Attends Club or Organization Meetings: Not on file    Marital Status: Not on file   Intimate Partner Violence:     Fear of Current or Ex-Partner: Not on file    Emotionally Abused: Not on file    Physically Abused: Not on file    Sexually Abused: Not on file   Housing Stability:     Unable to Pay for Housing in the Last Year: Not on file    Number of [place] in the Last Year: Not on file    Unstable Housing in the Last Year: Not on file     Family History   Problem Relation Age of Onset    Other Brother         hypoglycemia      Current Outpatient Medications   Medication Sig Dispense Refill    insulin aspart (NOVOLOG FLEXPEN) 100 UNIT/ML injection pen Pt taking 3 units with each meal 5 pen 3    lisinopril (PRINIVIL;ZESTRIL) 5 MG tablet Take 1 tablet by mouth daily 90 tablet 0    BD PEN NEEDLE MIHAELA U/F 32G X 4 MM MISC 1 each by Does not apply route daily 100 each 5    insulin glargine (BASAGLAR KWIKPEN) 100 UNIT/ML injection pen 12  units daily. 5 pen 5    ONETOUCH VERIO strip Test blood sugar TID dx: e10.65 300 each 5    OneTouch Delica Lancets 15E MISC Test blood sugar TID dx: e10.65 300 each 5    SITagliptin (JANUVIA) 100 MG tablet Take 1 tablet by mouth daily (Patient not taking: Reported on 2/15/2022) 90 tablet 0     No current facility-administered medications for this visit. Allergies   Allergen Reactions    Asa [Aspirin]      nausea    Codeine      dizzy    Meloxicam      Not sure of reaction    Penicillins      rash     Family Status   Relation Name Status    Brother  (Not Specified)       Review of Systems  I have reviewed the review of system questionnaire filled by the patient .   Patient was advised to contact PCP for non endocrine signs and symptoms OBJECTIVE:  /63   Pulse 75   Resp 16   Ht 5' 5.5\" (1.664 m)   Wt 96 lb (43.5 kg)   BMI 15.73 kg/m²    Wt Readings from Last 3 Encounters:   02/15/22 96 lb (43.5 kg)   11/23/21 86 lb (39 kg)   10/06/21 81 lb 6.4 oz (36.9 kg)       Constitutional: no acute distress, well appearing, well nourished  Psychiatric: oriented to person, place and time, judgement, insight and normal, recent and remote memory and intact and mood, affect are normal  Skin: skin and subcutaneous tissue is normal without mass,   Head and Face: examination of head and face revealed no abnormalities  Eyes: no lid or conjunctival swelling, no erythema or discharge, pupils are normal,   Ears/Nose: external inspection of ears and nose revealed no abnormalities, hearing is grossly normal  Oropharynx/Mouth/Face: lips, tongue and gums are normal with no lesions, the voice quality was normal  Neck: neck is supple and symmetric, with midline trachea and no masses, thyroid is normal    Pulmonary: no increased work of breathing or signs of respiratory distress, lungs are clear to auscultation  Cardiovascular: normal heart rate and rhythm, normal S1 and S2,   Musculoskeletal: normal gait and station,   Neurological: normal coordination, normal general cortical function      Lab Results   Component Value Date    LABA1C 7.1 12/16/2021    LABA1C 7.3 08/26/2021    LABA1C 7.6 05/21/2021         ASSESSMENT/PLAN:    ----  Type 2 Diabetes ---ELIDA vandana antibody strongly positive C-peptide of 1.8 in October 2019>>1.3   aic 14>>6.2>>6.3>>6.9>>6.9>>7.0>>7.6>>7.3  She has gained 15 lbs since sept 2021 after starting insulin   She was encouraged to continue working on her diet. -  Has been taking 12 units of lantus daily fasting 109--171   She takes novolog with breakfast and supper . Patient advised to start taking 2 units of NovoLog with each meal. And if bedtime numbers continue to stay elevated we can increase the supper dose of NovoLog to 4 units.   She still wants pills so wants to stop insulin again, I had a lengthy discussion with both patient and her  that she needs to continue eating decent meals to gain weight and at least eat  45 g of carbohydrate with each meal  Stopped  Januvia in sept 2021    Stopped metformin as she couldn't take the pills   She notes improvement in glucose levels     Patient was advised that s he has ending of her fingerstick blood glucose logs is crucial in management of her  diabetes. I will adjust the  doses of diabetic medications  according to sent data. Health Maintenance       Last Eye Exam: advised to have annual dilated eye exam. her last eye exam was in 2020   Last Podiatry Exam: Focalin worked last foot exam was 2020 she follows with podiatry every 3 months Dr Kayla Marina: Last LDL level was 68 in feb 2020  patient is not on any medication at the time of the blood work she did not carry the diagnosis of diabetes  Microalbumin/Creatinine Ratio: Normal in feb 2020 . Education: Reviewed ABCs of diabetes management (respective goals in parentheses): A1C (<7), blood pressure (<130/80), and cholesterol (LDL <100). Additional Education: Patient has seen diabetes Educator.      ---. Essential hypertension  On medication         --- Primary osteoarthritis of both knees  Pt denies any significant hypoglycemia     ----Constipation,Chronic problem     --- LOW BMI  Patient weight 117 lbs in 2013 but got down  88 >>79 >> now 96 in feb 2022   Gradual weight loss over the years. Today again we again discussed that she needs to consume more calories and more carbohydrates and if required she can take 1 to 2 units of Humalog with the meals she seems to be fearful of the idea of taking insulin we tried to alleviate her anxiety around insulin dosing. Encouraged to work on diet and exercise and follow-up with the primary care physician for work-up of weight loss if continues.           Reviewed and/or ordered clinical lab results yes   Reviewed and/or ordered radiology tests Yes  Reviewed and/or ordered other diagnostic tests yes   Made a decision to obtain old records yes   Reviewed and summarized old records yes     Eder Lux was counseled regarding symptoms of current diagnosis, course and complications of disease if inadequately treated, side effects of medications, diagnosis, treatment options, and prognosis, risks, benefits, complications, and alternatives of treatment, labs, imaging and other studies and treatment targets and goals. She understands instructions and counseling    Return in about 3 months (around 5/15/2022). Please note that some or all of this report was generated using voice recognition software. Please notify me in case of any questions about the content of this document, as some errors in transcription may have occurred .

## 2022-02-21 PROBLEM — E11.9 TYPE 2 DIABETES MELLITUS (HCC): Status: ACTIVE | Noted: 2022-02-21

## 2022-03-16 ENCOUNTER — TELEPHONE (OUTPATIENT)
Dept: ENDOCRINOLOGY | Age: 82
End: 2022-03-16

## 2022-03-18 ENCOUNTER — TELEPHONE (OUTPATIENT)
Dept: ENDOCRINOLOGY | Age: 82
End: 2022-03-18

## 2022-03-18 NOTE — TELEPHONE ENCOUNTER
These numbers are normal I would continue with current higher doses of insulin   Target glucose before meal 100--130

## 2022-03-18 NOTE — TELEPHONE ENCOUNTER
Patient took 5 units short acting insulin with lunch yesterday and normal 3 units with dinner. Blood sugar was 117 this morning and had 3 units short acting and 12 units of Basaglar. Blood sugar was 107  at noon today, did not give any insulin.  wants to know at what range should he hold insulin. FYI- it looks like per last note he was to increase her dinner dose from 3 to 5 units but he increased her lunch dose instead. He stated he thought that was what he was instructed to do.

## 2022-03-21 NOTE — TELEPHONE ENCOUNTER
Patient aware she should still take her insulin with meals. Patient states her sugars after dinner have been running 105 and it makes her nervous to go to bed. I advised that she can go back to taking 3 units with dinner for now. Patient verbalized understanding.

## 2022-04-02 DIAGNOSIS — E10.65 UNCONTROLLED TYPE 1 DIABETES MELLITUS WITH HYPERGLYCEMIA (HCC): ICD-10-CM

## 2022-04-04 RX ORDER — BLOOD SUGAR DIAGNOSTIC
STRIP MISCELLANEOUS
Qty: 300 STRIP | Refills: 5 | Status: SHIPPED | OUTPATIENT
Start: 2022-04-04 | End: 2022-10-09 | Stop reason: SDUPTHER

## 2022-04-06 DIAGNOSIS — E10.65 UNCONTROLLED TYPE 1 DIABETES MELLITUS WITH HYPERGLYCEMIA (HCC): ICD-10-CM

## 2022-04-06 RX ORDER — LANCETS 33 GAUGE
EACH MISCELLANEOUS
Qty: 300 EACH | Refills: 5 | Status: SHIPPED | OUTPATIENT
Start: 2022-04-06 | End: 2022-10-09 | Stop reason: SDUPTHER

## 2022-04-13 ENCOUNTER — TELEPHONE (OUTPATIENT)
Dept: ENDOCRINOLOGY | Age: 82
End: 2022-04-13

## 2022-04-13 NOTE — TELEPHONE ENCOUNTER
Please advise patient that I have reviewed their fingerstick glucose logs and no changes in diabetic regimen is suggested at this time.

## 2022-05-12 ENCOUNTER — TELEPHONE (OUTPATIENT)
Dept: ENDOCRINOLOGY | Age: 82
End: 2022-05-12

## 2022-05-24 ENCOUNTER — HOSPITAL ENCOUNTER (OUTPATIENT)
Age: 82
Discharge: HOME | End: 2022-05-24
Payer: MEDICARE

## 2022-05-24 DIAGNOSIS — E10.65 UNCONTROLLED TYPE 1 DIABETES MELLITUS WITH HYPERGLYCEMIA (HCC): ICD-10-CM

## 2022-05-24 LAB
A/G RATIO: 1.4 (ref 1.1–2.2)
ALBUMIN SERPL-MCNC: 4.2 G/DL (ref 3.4–5)
ALP BLD-CCNC: 74 U/L (ref 40–129)
ALT SERPL-CCNC: 8 U/L (ref 10–40)
ANION GAP SERPL CALCULATED.3IONS-SCNC: 15 MMOL/L (ref 3–16)
AST SERPL-CCNC: 10 U/L (ref 15–37)
BILIRUB SERPL-MCNC: 0.5 MG/DL (ref 0–1)
BUN BLDV-MCNC: 21 MG/DL (ref 7–20)
CALCIUM SERPL-MCNC: 8.8 MG/DL (ref 8.3–10.6)
CHLORIDE BLD-SCNC: 106 MMOL/L (ref 99–110)
CHOLESTEROL, TOTAL: 152 MG/DL (ref 0–199)
CO2: 20 MMOL/L (ref 21–32)
CREAT SERPL-MCNC: 0.9 MG/DL (ref 0.6–1.2)
CREATININE URINE: 51.9 MG/DL (ref 28–259)
ESTIMATED AVERAGE GLUCOSE: 145.6 MG/DL
GFR AFRICAN AMERICAN: >60
GFR NON-AFRICAN AMERICAN: 60
GLUCOSE BLD-MCNC: 130 MG/DL (ref 70–99)
HBA1C MFR BLD: 6.7 %
HDLC SERPL-MCNC: 66 MG/DL (ref 40–60)
LDL CHOLESTEROL CALCULATED: 75 MG/DL
MICROALBUMIN UR-MCNC: 3.3 MG/DL
MICROALBUMIN/CREAT UR-RTO: 63.6 MG/G (ref 0–30)
POTASSIUM SERPL-SCNC: 4.1 MMOL/L (ref 3.5–5.1)
SODIUM BLD-SCNC: 141 MMOL/L (ref 136–145)
TOTAL PROTEIN: 7.1 G/DL (ref 6.4–8.2)
TRIGL SERPL-MCNC: 57 MG/DL (ref 0–150)
TSH SERPL DL<=0.05 MIU/L-ACNC: 4.45 UIU/ML (ref 0.27–4.2)
VLDLC SERPL CALC-MCNC: 11 MG/DL

## 2022-05-24 PROCEDURE — 36415 COLL VENOUS BLD VENIPUNCTURE: CPT

## 2022-05-24 PROCEDURE — 80061 LIPID PANEL: CPT

## 2022-05-24 PROCEDURE — 83036 HEMOGLOBIN GLYCOSYLATED A1C: CPT

## 2022-05-24 PROCEDURE — 82570 ASSAY OF URINE CREATININE: CPT

## 2022-05-24 PROCEDURE — 82043 UR ALBUMIN QUANTITATIVE: CPT

## 2022-05-24 PROCEDURE — 84443 ASSAY THYROID STIM HORMONE: CPT

## 2022-05-24 PROCEDURE — 80053 COMPREHEN METABOLIC PANEL: CPT

## 2022-05-27 DIAGNOSIS — E10.65 UNCONTROLLED TYPE 1 DIABETES MELLITUS WITH HYPERGLYCEMIA (HCC): ICD-10-CM

## 2022-05-27 RX ORDER — PEN NEEDLE, DIABETIC 32GX 5/32"
NEEDLE, DISPOSABLE MISCELLANEOUS
Qty: 10 EACH | Refills: 5 | Status: SHIPPED | OUTPATIENT
Start: 2022-05-27

## 2022-06-10 ENCOUNTER — TELEPHONE (OUTPATIENT)
Dept: ENDOCRINOLOGY | Age: 82
End: 2022-06-10

## 2022-06-21 ENCOUNTER — OFFICE VISIT (OUTPATIENT)
Dept: ENDOCRINOLOGY | Age: 82
End: 2022-06-21
Payer: MEDICARE

## 2022-06-21 VITALS
WEIGHT: 101 LBS | HEIGHT: 66 IN | DIASTOLIC BLOOD PRESSURE: 76 MMHG | HEART RATE: 68 BPM | SYSTOLIC BLOOD PRESSURE: 154 MMHG | BODY MASS INDEX: 16.23 KG/M2 | RESPIRATION RATE: 16 BRPM

## 2022-06-21 DIAGNOSIS — E10.65 UNCONTROLLED TYPE 1 DIABETES MELLITUS WITH HYPERGLYCEMIA (HCC): Primary | ICD-10-CM

## 2022-06-21 DIAGNOSIS — E13.9 LADA (LATENT AUTOIMMUNE DIABETES IN ADULTS), MANAGED AS TYPE 2 (HCC): ICD-10-CM

## 2022-06-21 PROCEDURE — 1123F ACP DISCUSS/DSCN MKR DOCD: CPT | Performed by: INTERNAL MEDICINE

## 2022-06-21 PROCEDURE — 3044F HG A1C LEVEL LT 7.0%: CPT | Performed by: INTERNAL MEDICINE

## 2022-06-21 PROCEDURE — 99214 OFFICE O/P EST MOD 30 MIN: CPT | Performed by: INTERNAL MEDICINE

## 2022-06-21 NOTE — PROGRESS NOTES
Jaja Patel is a 79 y.o. 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 Jaja Patel was diagnosed with Diabetes mellitus in March 2019 when she was admitted to the hospital with polyuria polydipsia and weight loss. .  At the time of diagnosis patient had polyuria polydipsia and weight loss aprox 15 lbs . She was admitted to hospital for DKA in march 2019 and that is when she was diagnosed with diabetes , her aic was 14.9 she was discharged home on basal bolus insulin. Patient has been watching her diet closely and does not eat enough due to the fear that her blood glucose are good to go elevated. At the same time she does tend to eat snacks between meals so that she does not have hypoglycemia. She denies any hypoglycemia. --On review of chart in November 2015 she had blood work done and her blood glucose was 88, in February 2017 she had a fasting glucose of 106, again in August 2018 she was noted to have a blood glucose of 106 on her Chem-7  In March 2019 when she was admitted to the hospital with a diabetic ketoacidosis her glucose was 665, sodium 125 and anion gap of 27 with a creatinine of 1.8. She had an A1c of 14.9    --- Weight loss according to the  she has been slowly losing weight in the last 5 years approximately since 2014 when she weighed around 130 to 140 pounds --denied any significant change in appetite at that time. On review of chart she had lost 16 to 17 pounds from November 2017 to March 2019  --In August 2013 she weighed 117 pounds as per office note so actual weight loss might be approximately 30 pounds since 2013 but it has been gradually. INTERIM:    Diabetes  She presents for her follow-up diabetic visit. She has type 1 diabetes mellitus. No MedicAlert identification noted. The initial diagnosis of diabetes was made 7 months ago.  Her disease course has been improving. There are no hypoglycemic associated symptoms. Associated symptoms include polyphagia and polyuria. Pertinent negatives for diabetes include no weight loss. There are no hypoglycemic complications. Symptoms are improving. There are no diabetic complications. Risk factors for coronary artery disease include hypertension. Current diabetic treatment includes insulin injections. She is compliant with treatment most of the time. Her weight is stable. She is following a generally healthy diet. Meal planning includes avoidance of concentrated sweets. She has not had a previous visit with a dietitian. She rarely participates in exercise. Her breakfast blood glucose is taken between 7-8 am. Her breakfast blood glucose range is generally  mg/dl. Her dinner blood glucose range is generally 140-180 mg/dl. An ACE inhibitor/angiotensin II receptor blocker is being taken. She sees a podiatrist.Eye exam is current. Takes lantus 12 units a day   Novolog 3--5  units with breakfast and supper which is dinner. They call lunch dinner and typically doesn't take any insulin with lunch   She has gained 15 lbs since sept 2021, since starting insulin. She was encouraged to continue eating at least 30 to 40 g of carbs per meal.    Weight trend: stable  Prior visit with dietician: no  Current diet: on average, 3 meals per day  Current exercise: rare    She has hypertension and takes medication   She has been diagnosed with Arthritis and is not on meds.     Past Medical History:   Diagnosis Date    Diabetes mellitus ([address]     Hypertension       Patient Active Problem List   Diagnosis    Essential hypertension    Constipation    Primary osteoarthritis of both knees    Bilateral impacted cerumen    Conductive hearing loss, bilateral    Severe protein-calorie malnutrition (HCC)    Tubular adenoma of colon    Diabetes mellitus type 1.5, managed as type 2 ([address]    Adenoma of transverse colon    Type 2 diabetes mellitus     Past Surgical History:   Procedure Laterality Date    CHOLECYSTECTOMY      COLECTOMY N/A 8/25/2021    LAPAROSCOPIC ASSISTED TRANSVERSE COLON RESECTION performed by Jerson Branham MD at [place] [address] COLONOSCOPY N/A 6/21/2021    COLONOSCOPY WITH BIOPSY performed by Anthony Lin MD at [address] [place] [address]  6/21/2021    COLONOSCOPY SUBMUCOSAL/BOTOX INJECTION performed by Anthony Lin MD at [address] [place] [address]  6/21/2021    COLONOSCOPY POLYPECTOMY SNARE/COLD BIOPSY performed by Anthony Lin MD at [address] History     Socioeconomic History    Marital status:      Spouse name: Not on file    Number of children: Not on file    Years of education: Not on file    Highest education level: Not on file   Occupational History    Not on file   Tobacco Use    Smoking status: Former Smoker     Packs/day: 0.50     Years: 10.00     Pack years: 5.00     Types: Cigarettes    Smokeless tobacco: Never Used   Vaping Use    Vaping Use: Never used   Substance and Sexual Activity    Alcohol use: No    Drug use: No    Sexual activity: Not on file   Other Topics Concern    Not on file   Social History Narrative    Not on file     Social Determinants of Health     Financial Resource Strain:     Difficulty of Paying Living Expenses: Not on file   Food Insecurity:     Worried About [address] in the Last Year: Not on file    [address] in the Last Year: Not on file   Transportation Needs:     Lack of Transportation (Medical): Not on file    Lack of Transportation (Non-Medical):  Not on file   Physical Activity:     Days of Exercise per Week: Not on file    Minutes of Exercise per Session: Not on file   Stress:     Feeling of Stress : Not on file   Social Connections:     Frequency of Communication with Friends and Family: Not on file    Frequency of Social Gatherings with Friends and Family: Not on file    Attends [employer] Services: Not on file    Active Member of Clubs or Organizations: Not on file    Attends Club or Organization Meetings: Not on file    Marital Status: Not on file   Intimate Partner Violence:     Fear of Current or Ex-Partner: Not on file    Emotionally Abused: Not on file    Physically Abused: Not on file    Sexually Abused: Not on file   Housing Stability:     Unable to Pay for Housing in the Last Year: Not on file    Number of [place] in the Last Year: Not on file    Unstable Housing in the Last Year: Not on file     Family History   Problem Relation Age of Onset    Other Brother         hypoglycemia      Current Outpatient Medications   Medication Sig Dispense Refill    BD PEN NEEDLE MIHAELA 2ND GEN 32G X 4 MM MISC USE AS DIRECTED 10 each 5    lisinopril (PRINIVIL;ZESTRIL) 5 MG tablet Take 1 tablet by mouth daily 90 tablet 0    OneTouch Delica Lancets 06E MISC TEST BLOOD SUGAR 3 TIMES A DAY DX: E10.65 300 each 5    ONETOUCH VERIO strip TEST BLOOD SUGAR 3 TIMES A DAY DX: E10.65 300 strip 5    insulin aspart (NOVOLOG FLEXPEN) 100 UNIT/ML injection pen Pt taking 3 units with each meal (Patient taking differently: Pt taking 3 units with each meal (3 units with breakfast and lunch and 5 units with dinner)) 5 pen 3    insulin glargine (BASAGLAR KWIKPEN) 100 UNIT/ML injection pen 12  units daily. 5 pen 5     No current facility-administered medications for this visit. Allergies   Allergen Reactions    Asa [Aspirin]      nausea    Codeine      dizzy    Meloxicam      Not sure of reaction    Penicillins      rash     Family Status   Relation Name Status    Brother  (Not Specified)       Review of Systems  I have reviewed the review of system questionnaire filled by the patient .   Patient was advised to contact PCP for non endocrine signs and symptoms       OBJECTIVE:  BP (!) 154/76   Pulse 68   Resp 16   Ht 5' 5.5\" (1.664 m)   Wt 101 lb (45.8 kg)   BMI 16.55 kg/m²    Wt Readings from Last 3 Encounters:   06/21/22 101 lb (45.8 kg)   05/23/22 103 lb (46.7 kg)   02/21/22 95 lb (43.1 kg)       Constitutional: no acute distress, well appearing, well nourished  Psychiatric: oriented to person, place and time, judgement, insight and normal, recent and remote memory and intact and mood, affect are normal  Skin: skin and subcutaneous tissue is normal without mass,   Head and Face: examination of head and face revealed no abnormalities  Eyes: no lid or conjunctival swelling, no erythema or discharge, pupils are normal,   Ears/Nose: external inspection of ears and nose revealed no abnormalities, hearing is grossly normal  Oropharynx/Mouth/Face: lips, tongue and gums are normal with no lesions, the voice quality was normal  Neck: neck is supple and symmetric, with midline trachea and no masses, thyroid is normal    Pulmonary: no increased work of breathing or signs of respiratory distress, lungs are clear to auscultation  Cardiovascular: normal heart rate and rhythm, normal S1 and S2,   Musculoskeletal: normal gait and station,   Neurological: normal coordination, normal general cortical function      Lab Results   Component Value Date    LABA1C 6.7 05/24/2022    LABA1C 7.1 12/16/2021    LABA1C 7.3 08/26/2021         ASSESSMENT/PLAN:    ----  Type 2 Diabetes ---ELIDA vandana antibody strongly positive C-peptide of 1.8 in October 2019>>1.3   aic 14>>6.2>>6.3>>6.9>>6.9>>7.0>>7.6>>7.3  A1c is 6.7 which is improved from the last visit  She has gained 15 lbs since sept 2021 after starting insulin   She was encouraged to continue working on her diet. -  Has been taking 12 units of lantus daily fasting 109--171   She takes novolog with breakfast and supper .   Patient advised to start taking 3--5 units of NovoLog with each meal. And if bedtime numbers continue to stay elevated we can increase the supper dose of NovoLog  with dinner    Stopped  Januvia in sept 2021    Stopped metformin as she couldn't take the pills   She notes improvement in glucose levels     Patient was advised that s he has ending of her fingerstick blood glucose logs is crucial in management of her  diabetes. I will adjust the  doses of diabetic medications  according to sent data. Health Maintenance   Last Eye Exam: advised to have annual dilated eye exam. her last eye exam was in 2022   Last Podiatry Exam: Mirlande Vilchis worked last foot exam was 2020 she follows with podiatry every 3 months Dr Virginia Chavez: Last LDL level was 68 in feb 2020  patient is not on any medication at the time of the blood work she did not carry the diagnosis of diabetes  Microalbumin/Creatinine Ratio: Normal in feb 2020 . Education: Reviewed ABCs of diabetes management (respective goals in parentheses): A1C (<7), blood pressure (<130/80), and cholesterol (LDL <100). Additional Education: Patient has seen diabetes Educator. TSH elevated @4.45   Will repeat labs in 3 -4 months   Patient denies any symptoms or sign suggestive of hypothyroidism      ---. Essential hypertension  On medication BP was high to day   She denies any chest pain   She will check at home and call us if stays elevated     --- Primary osteoarthritis of both knees  Pt denies any significant hypoglycemia     ----Constipation,Chronic problem     --- LOW BMI  Patient weight 117 lbs in 2013 but got down  88 >>79 >> now 106     Gradual weight loss over the years. Today again we again discussed that she needs to consume more calories and more carbohydrates and if required she can take 1 to 2 units of Humalog with the meals she seems to be fearful of the idea of taking insulin we tried to alleviate her anxiety around insulin dosing. Encouraged to work on diet and exercise and follow-up with the primary care physician for work-up of weight loss if continues.           Reviewed and/or ordered clinical lab results yes   Reviewed and/or ordered radiology tests Yes  Reviewed and/or ordered other diagnostic tests yes

## 2022-08-04 ENCOUNTER — TELEPHONE (OUTPATIENT)
Dept: ENDOCRINOLOGY | Age: 82
End: 2022-08-04

## 2022-08-31 ENCOUNTER — TELEPHONE (OUTPATIENT)
Dept: ENDOCRINOLOGY | Age: 82
End: 2022-08-31

## 2022-09-28 ENCOUNTER — TELEPHONE (OUTPATIENT)
Dept: ENDOCRINOLOGY | Age: 82
End: 2022-09-28

## 2022-09-28 RX ORDER — SITAGLIPTIN AND METFORMIN HYDROCHLORIDE 100; 1000 MG/1; MG/1
1 TABLET, FILM COATED, EXTENDED RELEASE ORAL DAILY
Qty: 90 TABLET | Refills: 1 | Status: SHIPPED | OUTPATIENT
Start: 2022-09-28

## 2022-09-28 NOTE — TELEPHONE ENCOUNTER
Please advise patient that I have sent a prescription for Janumet which is a combination of Januvia and metformin for this patient if that is something that her insurance covers then she can start taking Janumet but she should not stop insulin until her blood sugars start running low. Previously when she tried these medication her glucose was running very high.

## 2022-09-28 NOTE — TELEPHONE ENCOUNTER
Patient aware. Patient states she does not want to take injections anymore and she wants to switch to a pill.

## 2022-09-28 NOTE — TELEPHONE ENCOUNTER
Patient and her  made aware. She is going to continue the [address] in the morning per Dr. Marianna Chapman and will switch to taking the Janumet. No questions or concerns at this time.

## 2022-09-30 ENCOUNTER — TELEPHONE (OUTPATIENT)
Dept: ENDOCRINOLOGY | Age: 82
End: 2022-09-30

## 2022-09-30 NOTE — TELEPHONE ENCOUNTER
The patient cant swallow metformin, the pill is too big. Her  is calling to ask for a smaller pill. His number [facility].

## 2022-10-03 RX ORDER — METFORMIN HYDROCHLORIDE 500 MG/5ML
SOLUTION ORAL
Qty: 120 ML | Refills: 0 | Status: SHIPPED | OUTPATIENT
Start: 2022-10-03 | End: 2022-10-19 | Stop reason: SDUPTHER

## 2022-10-07 ENCOUNTER — TELEPHONE (OUTPATIENT)
Dept: ENDOCRINOLOGY | Age: 82
End: 2022-10-07

## 2022-10-07 NOTE — TELEPHONE ENCOUNTER
is requesting one tech lancets, 90 day supply and 90 day supply of test strips. She tests 3 x a day. Pharmacy will be [facility] and [address] [place] [address].       Phone [phone]

## 2022-10-09 DIAGNOSIS — E10.65 UNCONTROLLED TYPE 1 DIABETES MELLITUS WITH HYPERGLYCEMIA (HCC): ICD-10-CM

## 2022-10-09 RX ORDER — LANCETS 33 GAUGE
EACH MISCELLANEOUS
Qty: 300 EACH | Refills: 5 | Status: SHIPPED | OUTPATIENT
Start: 2022-10-09

## 2022-10-09 RX ORDER — BLOOD SUGAR DIAGNOSTIC
STRIP MISCELLANEOUS
Qty: 300 STRIP | Refills: 5 | Status: SHIPPED | OUTPATIENT
Start: 2022-10-09

## 2022-10-19 ENCOUNTER — TELEPHONE (OUTPATIENT)
Dept: ENDOCRINOLOGY | Age: 82
End: 2022-10-19

## 2022-10-19 DIAGNOSIS — E10.65 UNCONTROLLED TYPE 1 DIABETES MELLITUS WITH HYPERGLYCEMIA (HCC): Primary | ICD-10-CM

## 2022-10-19 RX ORDER — METFORMIN HYDROCHLORIDE 500 MG/5ML
SOLUTION ORAL
Qty: 300 ML | Refills: 3 | Status: SHIPPED | OUTPATIENT
Start: 2022-10-19

## 2022-10-24 ENCOUNTER — TELEPHONE (OUTPATIENT)
Dept: ENDOCRINOLOGY | Age: 82
End: 2022-10-24

## 2022-10-24 NOTE — TELEPHONE ENCOUNTER
Pt's  calling and asking if pt can take liquid tylenol w/ her Metformin? He states not at the same time.  Pt has a strain in her neck    # [phone]

## 2022-10-24 NOTE — TELEPHONE ENCOUNTER
Returned patients husbands call and advised that it was okay to take liquid tylenol with the metformin.

## 2022-11-05 ENCOUNTER — HOSPITAL ENCOUNTER (OUTPATIENT)
Age: 82
Discharge: HOME | End: 2022-11-05
Payer: MEDICARE

## 2022-11-05 DIAGNOSIS — E10.65 UNCONTROLLED TYPE 1 DIABETES MELLITUS WITH HYPERGLYCEMIA (HCC): ICD-10-CM

## 2022-11-05 DIAGNOSIS — E13.9 LADA (LATENT AUTOIMMUNE DIABETES IN ADULTS), MANAGED AS TYPE 2 (HCC): ICD-10-CM

## 2022-11-05 LAB
A/G RATIO: 1.6 (ref 1.1–2.2)
ALBUMIN SERPL-MCNC: 4.5 G/DL (ref 3.4–5)
ALP BLD-CCNC: 62 U/L (ref 40–129)
ALT SERPL-CCNC: 6 U/L (ref 10–40)
ANION GAP SERPL CALCULATED.3IONS-SCNC: 15 MMOL/L (ref 3–16)
ANTI-THYROGLOB ABS: 17 IU/ML
AST SERPL-CCNC: 12 U/L (ref 15–37)
BILIRUB SERPL-MCNC: 0.4 MG/DL (ref 0–1)
BUN BLDV-MCNC: 18 MG/DL (ref 7–20)
CALCIUM SERPL-MCNC: 9.3 MG/DL (ref 8.3–10.6)
CHLORIDE BLD-SCNC: 105 MMOL/L (ref 99–110)
CHOLESTEROL, TOTAL: 174 MG/DL (ref 0–199)
CO2: 22 MMOL/L (ref 21–32)
CREAT SERPL-MCNC: 1 MG/DL (ref 0.6–1.2)
GFR SERPL CREATININE-BSD FRML MDRD: 56 ML/MIN/{1.73_M2}
GLUCOSE BLD-MCNC: 104 MG/DL (ref 70–99)
HDLC SERPL-MCNC: 59 MG/DL (ref 40–60)
LDL CHOLESTEROL CALCULATED: 100 MG/DL
POTASSIUM SERPL-SCNC: 3.6 MMOL/L (ref 3.5–5.1)
SODIUM BLD-SCNC: 142 MMOL/L (ref 136–145)
T4 FREE: 1.2 NG/DL (ref 0.9–1.8)
THYROID PEROXIDASE (TPO) ABS: 8 IU/ML
TOTAL PROTEIN: 7.3 G/DL (ref 6.4–8.2)
TRIGL SERPL-MCNC: 76 MG/DL (ref 0–150)
TSH SERPL DL<=0.05 MIU/L-ACNC: 3.2 UIU/ML (ref 0.27–4.2)
VLDLC SERPL CALC-MCNC: 15 MG/DL

## 2022-11-05 PROCEDURE — 84443 ASSAY THYROID STIM HORMONE: CPT

## 2022-11-05 PROCEDURE — 80053 COMPREHEN METABOLIC PANEL: CPT

## 2022-11-05 PROCEDURE — 84439 ASSAY OF FREE THYROXINE: CPT

## 2022-11-05 PROCEDURE — 86376 MICROSOMAL ANTIBODY EACH: CPT

## 2022-11-05 PROCEDURE — 36415 COLL VENOUS BLD VENIPUNCTURE: CPT

## 2022-11-05 PROCEDURE — 83036 HEMOGLOBIN GLYCOSYLATED A1C: CPT

## 2022-11-05 PROCEDURE — 86800 THYROGLOBULIN ANTIBODY: CPT

## 2022-11-05 PROCEDURE — 80061 LIPID PANEL: CPT

## 2022-11-06 LAB
ESTIMATED AVERAGE GLUCOSE: 139.9 MG/DL
HBA1C MFR BLD: 6.5 %

## 2022-11-07 ENCOUNTER — HOSPITAL ENCOUNTER (OUTPATIENT)
Age: 82
Discharge: HOME | End: 2022-11-07
Payer: MEDICARE

## 2022-11-07 LAB
CREATININE URINE: 55.6 MG/DL (ref 28–259)
MICROALBUMIN UR-MCNC: <1.2 MG/DL
MICROALBUMIN/CREAT UR-RTO: NORMAL MG/G (ref 0–30)

## 2022-11-07 PROCEDURE — 82570 ASSAY OF URINE CREATININE: CPT

## 2022-11-07 PROCEDURE — 82043 UR ALBUMIN QUANTITATIVE: CPT

## 2022-11-15 ENCOUNTER — OFFICE VISIT (OUTPATIENT)
Dept: ENDOCRINOLOGY | Age: 82
End: 2022-11-15
Payer: MEDICARE

## 2022-11-15 VITALS
WEIGHT: 104 LBS | RESPIRATION RATE: 16 BRPM | HEIGHT: 66 IN | DIASTOLIC BLOOD PRESSURE: 78 MMHG | BODY MASS INDEX: 16.71 KG/M2 | SYSTOLIC BLOOD PRESSURE: 157 MMHG | HEART RATE: 72 BPM

## 2022-11-15 DIAGNOSIS — E10.65 UNCONTROLLED TYPE 1 DIABETES MELLITUS WITH HYPERGLYCEMIA (HCC): Primary | ICD-10-CM

## 2022-11-15 DIAGNOSIS — D12.6 TUBULAR ADENOMA OF COLON: ICD-10-CM

## 2022-11-15 DIAGNOSIS — I10 ESSENTIAL HYPERTENSION: ICD-10-CM

## 2022-11-15 PROCEDURE — 3074F SYST BP LT 130 MM HG: CPT | Performed by: INTERNAL MEDICINE

## 2022-11-15 PROCEDURE — 99214 OFFICE O/P EST MOD 30 MIN: CPT | Performed by: INTERNAL MEDICINE

## 2022-11-15 PROCEDURE — 3078F DIAST BP <80 MM HG: CPT | Performed by: INTERNAL MEDICINE

## 2022-11-15 PROCEDURE — 3044F HG A1C LEVEL LT 7.0%: CPT | Performed by: INTERNAL MEDICINE

## 2022-11-15 PROCEDURE — 1123F ACP DISCUSS/DSCN MKR DOCD: CPT | Performed by: INTERNAL MEDICINE

## 2022-11-15 RX ORDER — LISINOPRIL 10 MG/1
10 TABLET ORAL DAILY
Qty: 30 TABLET | Refills: 2 | Status: SHIPPED | OUTPATIENT
Start: 2022-11-15 | End: 2022-11-22

## 2022-11-15 RX ORDER — METFORMIN HYDROCHLORIDE 500 MG/5ML
SOLUTION ORAL
Qty: 900 ML | Refills: 3 | Status: SHIPPED | OUTPATIENT
Start: 2022-11-15

## 2022-11-15 RX ORDER — LISINOPRIL 10 MG/1
10 TABLET ORAL DAILY
Qty: 30 TABLET | Refills: 2 | Status: SHIPPED | OUTPATIENT
Start: 2022-11-15 | End: 2022-11-15 | Stop reason: SDUPTHER

## 2022-11-15 NOTE — PROGRESS NOTES
Dustin Alvarado is a 79 y.o. 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 Dustin Alvarado was diagnosed with Diabetes mellitus in March 2019 when she was admitted to the hospital with polyuria polydipsia and weight loss. .  At the time of diagnosis patient had polyuria polydipsia and weight loss aprox 15 lbs . She was admitted to hospital for DKA in march 2019 and that is when she was diagnosed with diabetes , her aic was 14.9 she was discharged home on basal bolus insulin. Patient has been watching her diet closely and does not eat enough due to the fear that her blood glucose are good to go elevated. At the same time she does tend to eat snacks between meals so that she does not have hypoglycemia. She denies any hypoglycemia. --On review of chart in November 2015 she had blood work done and her blood glucose was 88, in February 2017 she had a fasting glucose of 106, again in August 2018 she was noted to have a blood glucose of 106 on her Chem-7  In March 2019 when she was admitted to the hospital with a diabetic ketoacidosis her glucose was 665, sodium 125 and anion gap of 27 with a creatinine of 1.8. She had an A1c of 14.9    --- Weight loss according to the  she has been slowly losing weight in the last 5 years approximately since 2014 when she weighed around 130 to 140 pounds --denied any significant change in appetite at that time. On review of chart she had lost 16 to 17 pounds from November 2017 to March 2019  --In August 2013 she weighed 117 pounds as per office note so actual weight loss might be approximately 30 pounds since 2013 but it has been gradually. INTERIM:    Diabetes  She presents for her follow-up diabetic visit. She has type 1 diabetes mellitus. No MedicAlert identification noted. The initial diagnosis of diabetes was made 7 months ago.  Her disease course has been improving. There are no hypoglycemic associated symptoms. Associated symptoms include polyphagia and polyuria. Pertinent negatives for diabetes include no weight loss. There are no hypoglycemic complications. Symptoms are improving. There are no diabetic complications. Risk factors for coronary artery disease include hypertension. Current diabetic treatment includes insulin injections. She is compliant with treatment most of the time. Her weight is stable. She is following a generally healthy diet. Meal planning includes avoidance of concentrated sweets. She has not had a previous visit with a dietitian. She rarely participates in exercise. Her breakfast blood glucose is taken between 7-8 am. Her breakfast blood glucose range is generally  mg/dl. Her dinner blood glucose range is generally 140-180 mg/dl. An ACE inhibitor/angiotensin II receptor blocker is being taken. She sees a podiatrist.Eye exam is current. Takes lantus 12 units a day   Fasting 77--123  She stopped Novolog in oct 2022   Wants to stay on metformin. Weight trend: stable  Prior visit with dietician: no  Current diet: on average, 3 meals per day  Current exercise: rare    She has hypertension and takes medication   She has been diagnosed with Arthritis and is not on meds.     Past Medical History:   Diagnosis Date    Diabetes mellitus ([address]     Hypertension       Patient Active Problem List   Diagnosis    Essential hypertension    Constipation    Primary osteoarthritis of both knees    Bilateral impacted cerumen    Conductive hearing loss, bilateral    Severe protein-calorie malnutrition ([address]    Tubular adenoma of colon    Diabetes mellitus type 1.5, managed as type 2 ([address]    Adenoma of transverse colon    Type 2 diabetes mellitus     Past Surgical History:   Procedure Laterality Date    CHOLECYSTECTOMY      COLECTOMY N/A 8/25/2021    LAPAROSCOPIC ASSISTED TRANSVERSE COLON RESECTION performed by Burke Cary MD at [address] [place] [address] COLONOSCOPY N/A 6/21/2021    COLONOSCOPY WITH BIOPSY performed by Emily Heredia MD at [facility]    COLONOSCOPY  6/21/2021    COLONOSCOPY SUBMUCOSAL/BOTOX INJECTION performed by Emily Heredia MD at [place] [address]  6/21/2021    COLONOSCOPY POLYPECTOMY SNARE/COLD BIOPSY performed by Emily Heredia MD at [address] [place] [address] History     Socioeconomic History    Marital status:      Spouse name: Not on file    Number of children: Not on file    Years of education: Not on file    Highest education level: Not on file   Occupational History    Not on file   Tobacco Use    Smoking status: Former     Packs/day: 0.50     Years: 10.00     Pack years: 5.00     Types: Cigarettes    Smokeless tobacco: Never   Vaping Use    Vaping Use: Never used   Substance and Sexual Activity    Alcohol use: No    Drug use: No    Sexual activity: Not on file   Other Topics Concern    Not on file   Social History Narrative    Not on file     Social Determinants of Health     Financial Resource Strain: Not on file   Food Insecurity: Not on file   Transportation Needs: Not on file   Physical Activity: Not on file   Stress: Not on file   Social Connections: Not on file   Intimate Partner Violence: Not on file   Housing Stability: Not on file     Family History   Problem Relation Age of Onset    Other Brother         hypoglycemia      Current Outpatient Medications   Medication Sig Dispense Refill    metFORMIN HCl (RIOMET) 500 MG/5ML SOLN Take 5 ml  mL 3    lisinopril (PRINIVIL;ZESTRIL) 10 MG tablet Take 1 tablet by mouth daily 30 tablet 2    OneTouch Delica Lancets 72B MISC TEST BLOOD SUGAR 3 TIMES A DAY DX: E10.65 300 each 5    ONETOUCH VERIO strip TEST BLOOD SUGAR 3 TIMES A DAY DX: E10.65 300 strip 5    BD PEN NEEDLE MIHAELA 2ND GEN 32G X 4 MM MISC USE AS DIRECTED 10 each 5    insulin glargine (BASAGLAR KWIKPEN) 100 UNIT/ML injection pen 12  units daily.  5 pen 5     No current facility-administered medications for this visit. Allergies   Allergen Reactions    Asa [Aspirin]      nausea    Codeine      dizzy    Meloxicam      Not sure of reaction    Penicillins      rash     Family Status   Relation Name Status    Brother  (Not Specified)       Review of Systems  I have reviewed the review of system questionnaire filled by the patient .   Patient was advised to contact PCP for non endocrine signs and symptoms       OBJECTIVE:  BP (!) 157/78   Pulse 72   Resp 16   Ht 5' 5.5\" (1.664 m)   Wt 104 lb (47.2 kg)   BMI 17.04 kg/m²    Wt Readings from Last 3 Encounters:   11/15/22 104 lb (47.2 kg)   08/23/22 102 lb (46.3 kg)   06/21/22 101 lb (45.8 kg)       Constitutional: no acute distress, well appearing, well nourished  Psychiatric: oriented to person, place and time, judgement, insight and normal, recent and remote memory and intact and mood, affect are normal  Skin: skin and subcutaneous tissue is normal without mass,   Head and Face: examination of head and face revealed no abnormalities  Eyes: no lid or conjunctival swelling, no erythema or discharge, pupils are normal,   Ears/Nose: external inspection of ears and nose revealed no abnormalities, hearing is grossly normal  Oropharynx/Mouth/Face: lips, tongue and gums are normal with no lesions, the voice quality was normal  Neck: neck is supple and symmetric, with midline trachea and no masses, thyroid is normal    Pulmonary: no increased work of breathing or signs of respiratory distress, lungs are clear to auscultation  Cardiovascular: normal heart rate and rhythm, normal S1 and S2,   Musculoskeletal: normal gait and station,   Neurological: normal coordination, normal general cortical function      Lab Results   Component Value Date/Time    LABA1C 6.5 11/05/2022 08:47 AM    LABA1C 6.7 05/24/2022 07:58 AM    LABA1C 7.1 12/16/2021 08:49 AM         ASSESSMENT/PLAN:    ----  Type 1.5  Diabetes ---ELIDA vandana antibody strongly positive C-peptide of 1.8 in October 2019>>1.3     A1c is 6.7 >>6.5  She was encouraged to continue working on her diet. -  Has been taking 12 units of lantus daily fasting 109--171   Advised to reduce  lantus to 8 units   She started liquid Metformin 500mg /5 ml BID in oct 2022   Pt doesn't want to take insulin anymore   But she was advised that due to [place] she will need insulin . Stopped novolog in oct 2022   Stopped  Januvia in sept 2021    Stopped metformin as she couldn't take the pills   She notes improvement in glucose levels         Health Maintenance   Last Eye Exam: advised to have annual dilated eye exam. her last eye exam was in 2022   Last Podiatry Exam: Zofia Barbosa worked last foot exam was 2020 she follows with podiatry every 3 months Dr Lizabeth Shen: Last LDL level was 100 in nov 2022  Microalbumin/Creatinine Ratio: Normal in feb 2020 . Education: Reviewed ABCs of diabetes management (respective goals in parentheses): A1C (<7), blood pressure (<130/80), and cholesterol (LDL <100). Additional Education: Patient has seen diabetes Educator. TSH elevated @4.45   Repeat normal in nov 2022 ---antibodies negative   Will continue to monitor   Patient denies any symptoms or sign suggestive of hypothyroidism      ---. Essential hypertension  On medication BP was high to day again   She denies any chest pain   Increase the dose to 10 mg daily   She will check at home and call us if stays elevated     --- Primary osteoarthritis of both knees  Pt denies any significant hypoglycemia     ----Constipation,Chronic problem     --- LOW BMI  Patient weight 117 lbs in 2013 but got down  88 >>79 >> now 106     Gradual weight loss over the years.   Today again we again discussed that she needs to consume more calories and more carbohydrates and if required she can take 1 to 2 units of Humalog with the meals she seems to be fearful of the idea of taking insulin we tried to alleviate her anxiety around insulin dosing. Encouraged to work on diet and exercise and follow-up with the primary care physician for work-up of weight loss if continues. Reviewed and/or ordered clinical lab results yes   Reviewed and/or ordered radiology tests Yes  Reviewed and/or ordered other diagnostic tests yes   Made a decision to obtain old records yes   Reviewed and summarized old records yes     Wilfredo Simon was counseled regarding symptoms of current diagnosis, course and complications of disease if inadequately treated, side effects of medications, diagnosis, treatment options, and prognosis, risks, benefits, complications, and alternatives of treatment, labs, imaging and other studies and treatment targets and goals. She understands instructions and counseling. Return in about 6 months (around 5/15/2023). Please note that some or all of this report was generated using voice recognition software. Please notify me in case of any questions about the content of this document, as some errors in transcription may have occurred .

## 2022-12-07 RX ORDER — LISINOPRIL 10 MG/1
TABLET ORAL
Qty: 30 TABLET | Refills: 2 | OUTPATIENT
Start: 2022-12-07

## 2022-12-12 ENCOUNTER — TELEPHONE (OUTPATIENT)
Dept: ENDOCRINOLOGY | Age: 82
End: 2022-12-12

## 2022-12-12 DIAGNOSIS — E10.65 UNCONTROLLED TYPE 1 DIABETES MELLITUS WITH HYPERGLYCEMIA (HCC): ICD-10-CM

## 2022-12-12 RX ORDER — PEN NEEDLE, DIABETIC 32GX 5/32"
NEEDLE, DISPOSABLE MISCELLANEOUS
Qty: 100 EACH | Refills: 5 | Status: SHIPPED | OUTPATIENT
Start: 2022-12-12

## 2022-12-12 RX ORDER — PEN NEEDLE, DIABETIC 32GX 5/32"
NEEDLE, DISPOSABLE MISCELLANEOUS
Qty: 100 EACH | Refills: 5 | Status: SHIPPED | OUTPATIENT
Start: 2022-12-12 | End: 2022-12-12 | Stop reason: SDUPTHER

## 2022-12-14 ENCOUNTER — TELEPHONE (OUTPATIENT)
Dept: ENDOCRINOLOGY | Age: 82
End: 2022-12-14

## 2022-12-30 RX ORDER — LISINOPRIL 10 MG/1
10 TABLET ORAL DAILY
Qty: 90 TABLET | Refills: 0 | Status: SHIPPED | OUTPATIENT
Start: 2022-12-30 | End: 2023-02-06 | Stop reason: SDUPTHER

## 2023-01-11 ENCOUNTER — TELEPHONE (OUTPATIENT)
Dept: ENDOCRINOLOGY | Age: 83
End: 2023-01-11

## 2023-01-16 NOTE — TELEPHONE ENCOUNTER
Call from patient's  wanting to know if Dr. Rosy Renner could increase her Metformin so that she doesn't have to inject so frequently with insulin throughout the day?     Please advise

## 2023-01-16 NOTE — TELEPHONE ENCOUNTER
Please advise patient to increase long-acting insulin by 2 units and increase short acting insulin with each meal by 2 units as well send glucose logs in another week

## 2023-01-16 NOTE — TELEPHONE ENCOUNTER
Patient's  states her blood sugar is 200- 260 during the day. She dropped off her logs last week. He wants to know what to do about numbers over 200?      His phone is [phone]

## 2023-01-16 NOTE — TELEPHONE ENCOUNTER
Patient and her  made aware.  Patient has not been taking any insulin with meals so she will start taking 2 units with each meal.

## 2023-01-25 ENCOUNTER — TELEPHONE (OUTPATIENT)
Dept: ENDOCRINOLOGY | Age: 83
End: 2023-01-25

## 2023-01-25 DIAGNOSIS — E10.65 UNCONTROLLED TYPE 1 DIABETES MELLITUS WITH HYPERGLYCEMIA (HCC): Primary | ICD-10-CM

## 2023-01-25 RX ORDER — INSULIN GLARGINE 100 [IU]/ML
INJECTION, SOLUTION SUBCUTANEOUS
Qty: 15 ML | Refills: 3 | Status: SHIPPED | OUTPATIENT
Start: 2023-01-25

## 2023-01-25 NOTE — TELEPHONE ENCOUNTER
Pt requesting refill  insulin glargine (BASAGLAR KWIKPEN) 100 UNIT/ML injection pen         [facility]/pharmacy #[id number]- [place], [place] - [address] [phone] [address] [phone] [address], [place] [address]   Phone:  [phone]  Fax:  [phone]

## 2023-02-06 PROBLEM — E10.65 UNCONTROLLED TYPE 1 DIABETES MELLITUS WITH HYPERGLYCEMIA (HCC): Status: ACTIVE | Noted: 2023-02-06

## 2023-02-08 ENCOUNTER — TELEPHONE (OUTPATIENT)
Dept: ENDOCRINOLOGY | Age: 83
End: 2023-02-08

## 2023-02-08 DIAGNOSIS — E10.65 UNCONTROLLED TYPE 1 DIABETES MELLITUS WITH HYPERGLYCEMIA (HCC): ICD-10-CM

## 2023-02-08 RX ORDER — METFORMIN HYDROCHLORIDE 500 MG/5ML
SOLUTION ORAL
Qty: 900 ML | Refills: 3 | Status: SHIPPED | OUTPATIENT
Start: 2023-02-08

## 2023-02-08 NOTE — TELEPHONE ENCOUNTER
Pt / came in office to drop off sugar logs (scanned on another tele encounter) and request refills on Metformin.  Send to [facility] on [address] in [address]   11-15-22  FOV    5-16-23

## 2023-03-02 ENCOUNTER — TELEPHONE (OUTPATIENT)
Dept: ENDOCRINOLOGY | Age: 83
End: 2023-03-02

## 2023-03-02 DIAGNOSIS — E10.65 UNCONTROLLED TYPE 1 DIABETES MELLITUS WITH HYPERGLYCEMIA (HCC): ICD-10-CM

## 2023-03-02 RX ORDER — PEN NEEDLE, DIABETIC 32GX 5/32"
NEEDLE, DISPOSABLE MISCELLANEOUS
Qty: 100 EACH | Refills: 5 | Status: SHIPPED | OUTPATIENT
Start: 2023-03-02 | End: 2023-03-02 | Stop reason: SDUPTHER

## 2023-03-02 RX ORDER — PEN NEEDLE, DIABETIC 32GX 5/32"
NEEDLE, DISPOSABLE MISCELLANEOUS
Qty: 200 EACH | Refills: 5 | Status: SHIPPED | OUTPATIENT
Start: 2023-03-02

## 2023-03-02 NOTE — TELEPHONE ENCOUNTER
Patient  called requesting a refill     Rx- BD PEN NEEDLE MIHAELA 2ND GEN 32G X 4 [address]- 11/15/22  NOV- 5/9/23    Please advise

## 2023-03-02 NOTE — TELEPHONE ENCOUNTER
Pts  calling and states there was a problem w/ the way the rx was wrote for the pen needles. She uses 4 needles per day and there are 100 in a box so it will only last her 25 days. She right now has enough for 2 more days .  Please re send corrected rx to [facility] on [facility]# [phone]

## 2023-03-08 ENCOUNTER — TELEPHONE (OUTPATIENT)
Dept: ENDOCRINOLOGY | Age: 83
End: 2023-03-08

## 2023-03-16 ENCOUNTER — TELEPHONE (OUTPATIENT)
Dept: ENDOCRINOLOGY | Age: 83
End: 2023-03-16

## 2023-03-16 DIAGNOSIS — E10.65 UNCONTROLLED TYPE 1 DIABETES MELLITUS WITH HYPERGLYCEMIA (HCC): ICD-10-CM

## 2023-03-16 RX ORDER — LANCETS 33 GAUGE
EACH MISCELLANEOUS
Qty: 300 EACH | Refills: 5 | Status: SHIPPED | OUTPATIENT
Start: 2023-03-16

## 2023-03-16 RX ORDER — BLOOD SUGAR DIAGNOSTIC
STRIP MISCELLANEOUS
Qty: 300 STRIP | Refills: 5 | Status: SHIPPED | OUTPATIENT
Start: 2023-03-16

## 2023-03-16 NOTE — TELEPHONE ENCOUNTER
Pt's  calling and states pt has about 1 week left but he would like a refill sent in for Once Touch Verio Test strips & One Touch Delicate Lancets both for 90 days    [facility]# [phone]

## 2023-04-05 ENCOUNTER — TELEPHONE (OUTPATIENT)
Dept: ENDOCRINOLOGY | Age: 83
End: 2023-04-05

## 2023-05-03 ENCOUNTER — TELEPHONE (OUTPATIENT)
Dept: ENDOCRINOLOGY | Age: 83
End: 2023-05-03

## 2023-05-09 PROBLEM — E11.8 DIABETES MELLITUS TYPE 2 WITH COMPLICATIONS (HCC): Status: ACTIVE | Noted: 2023-05-09

## 2023-05-11 ENCOUNTER — TELEPHONE (OUTPATIENT)
Dept: ENDOCRINOLOGY | Age: 83
End: 2023-05-11

## 2023-05-11 DIAGNOSIS — E10.65 UNCONTROLLED TYPE 1 DIABETES MELLITUS WITH HYPERGLYCEMIA (HCC): Primary | ICD-10-CM

## 2023-05-12 ENCOUNTER — HOSPITAL ENCOUNTER (OUTPATIENT)
Age: 83
Discharge: HOME | End: 2023-05-12
Payer: MEDICARE

## 2023-05-12 DIAGNOSIS — E10.65 UNCONTROLLED TYPE 1 DIABETES MELLITUS WITH HYPERGLYCEMIA (HCC): ICD-10-CM

## 2023-05-12 LAB
ALBUMIN SERPL-MCNC: 4.3 G/DL (ref 3.4–5)
ALBUMIN/GLOB SERPL: 1.4 {RATIO} (ref 1.1–2.2)
ALP SERPL-CCNC: 69 U/L (ref 40–129)
ALT SERPL-CCNC: 7 U/L (ref 10–40)
ANION GAP SERPL CALCULATED.3IONS-SCNC: 11 MMOL/L (ref 3–16)
AST SERPL-CCNC: 14 U/L (ref 15–37)
BILIRUB SERPL-MCNC: 0.4 MG/DL (ref 0–1)
BUN SERPL-MCNC: 18 MG/DL (ref 7–20)
CALCIUM SERPL-MCNC: 9.2 MG/DL (ref 8.3–10.6)
CHLORIDE SERPL-SCNC: 102 MMOL/L (ref 99–110)
CHOLEST SERPL-MCNC: 151 MG/DL (ref 0–199)
CO2 SERPL-SCNC: 23 MMOL/L (ref 21–32)
CREAT SERPL-MCNC: 1.1 MG/DL (ref 0.6–1.2)
CREAT UR-MCNC: 73.7 MG/DL (ref 28–259)
EST. AVERAGE GLUCOSE BLD GHB EST-MCNC: 151.3 MG/DL
GFR SERPLBLD CREATININE-BSD FMLA CKD-EPI: 50 ML/MIN/{1.73_M2}
GLUCOSE SERPL-MCNC: 126 MG/DL (ref 70–99)
HBA1C MFR BLD: 6.9 %
HDLC SERPL-MCNC: 59 MG/DL (ref 40–60)
LDLC SERPL CALC-MCNC: 79 MG/DL
MICROALBUMIN UR DL<=1MG/L-MCNC: 3.4 MG/DL
MICROALBUMIN/CREAT UR: 46.1 MG/G (ref 0–30)
POTASSIUM SERPL-SCNC: 4.2 MMOL/L (ref 3.5–5.1)
PROT SERPL-MCNC: 7.3 G/DL (ref 6.4–8.2)
SODIUM SERPL-SCNC: 136 MMOL/L (ref 136–145)
TRIGL SERPL-MCNC: 64 MG/DL (ref 0–150)
TSH SERPL DL<=0.005 MIU/L-ACNC: 2.98 UIU/ML (ref 0.27–4.2)
VLDLC SERPL CALC-MCNC: 13 MG/DL

## 2023-05-12 PROCEDURE — 83036 HEMOGLOBIN GLYCOSYLATED A1C: CPT

## 2023-05-12 PROCEDURE — 80061 LIPID PANEL: CPT

## 2023-05-12 PROCEDURE — 82043 UR ALBUMIN QUANTITATIVE: CPT

## 2023-05-12 PROCEDURE — 82570 ASSAY OF URINE CREATININE: CPT

## 2023-05-12 PROCEDURE — 84443 ASSAY THYROID STIM HORMONE: CPT

## 2023-05-12 PROCEDURE — 36415 COLL VENOUS BLD VENIPUNCTURE: CPT

## 2023-05-12 PROCEDURE — 80053 COMPREHEN METABOLIC PANEL: CPT

## 2023-05-16 ENCOUNTER — OFFICE VISIT (OUTPATIENT)
Dept: ENDOCRINOLOGY | Age: 83
End: 2023-05-16
Payer: MEDICARE

## 2023-05-16 VITALS
RESPIRATION RATE: 16 BRPM | SYSTOLIC BLOOD PRESSURE: 161 MMHG | DIASTOLIC BLOOD PRESSURE: 71 MMHG | HEART RATE: 74 BPM | BODY MASS INDEX: 15.84 KG/M2 | HEIGHT: 66 IN | WEIGHT: 98.6 LBS

## 2023-05-16 DIAGNOSIS — E13.9 DIABETES MELLITUS TYPE 1.5, MANAGED AS TYPE 2 (HCC): Primary | ICD-10-CM

## 2023-05-16 DIAGNOSIS — I10 ESSENTIAL HYPERTENSION: ICD-10-CM

## 2023-05-16 DIAGNOSIS — M17.0 PRIMARY OSTEOARTHRITIS OF BOTH KNEES: ICD-10-CM

## 2023-05-16 PROCEDURE — 3078F DIAST BP <80 MM HG: CPT | Performed by: INTERNAL MEDICINE

## 2023-05-16 PROCEDURE — 3044F HG A1C LEVEL LT 7.0%: CPT | Performed by: INTERNAL MEDICINE

## 2023-05-16 PROCEDURE — 3074F SYST BP LT 130 MM HG: CPT | Performed by: INTERNAL MEDICINE

## 2023-05-16 PROCEDURE — 99214 OFFICE O/P EST MOD 30 MIN: CPT | Performed by: INTERNAL MEDICINE

## 2023-05-16 PROCEDURE — 1123F ACP DISCUSS/DSCN MKR DOCD: CPT | Performed by: INTERNAL MEDICINE

## 2023-05-16 NOTE — PROGRESS NOTES
Danisha Mccoy is a 79 y.o. 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 Danisha Mccoy was diagnosed with Diabetes mellitus in March 2019 when she was admitted to the hospital with polyuria polydipsia and weight loss. .  At the time of diagnosis patient had polyuria polydipsia and weight loss aprox 15 lbs . She was admitted to hospital for DKA in march 2019 and that is when she was diagnosed with diabetes , her aic was 14.9 she was discharged home on basal bolus insulin. Patient has been watching her diet closely and does not eat enough due to the fear that her blood glucose are good to go elevated. At the same time she does tend to eat snacks between meals so that she does not have hypoglycemia. She denies any hypoglycemia. --On review of chart in November 2015 she had blood work done and her blood glucose was 88, in February 2017 she had a fasting glucose of 106, again in August 2018 she was noted to have a blood glucose of 106 on her Chem-7  In March 2019 when she was admitted to the hospital with a diabetic ketoacidosis her glucose was 665, sodium 125 and anion gap of 27 with a creatinine of 1.8. She had an A1c of 14.9    --- Weight loss according to the  she has been slowly losing weight in the last 5 years approximately since 2014 when she weighed around 130 to 140 pounds --denied any significant change in appetite at that time. On review of chart she had lost 16 to 17 pounds from November 2017 to March 2019  --In August 2013 she weighed 117 pounds as per office note so actual weight loss might be approximately 30 pounds since 2013 but it has been gradually. INTERIM:    Diabetes  She presents for her follow-up diabetic visit. She has type 1 diabetes mellitus. No MedicAlert identification noted. The initial diagnosis of diabetes was made 7 months ago.  Her disease course has been

## 2023-05-17 ENCOUNTER — TELEPHONE (OUTPATIENT)
Dept: ENDOCRINOLOGY | Age: 83
End: 2023-05-17

## 2023-05-17 DIAGNOSIS — E10.65 UNCONTROLLED TYPE 1 DIABETES MELLITUS WITH HYPERGLYCEMIA (HCC): ICD-10-CM

## 2023-05-17 RX ORDER — METFORMIN HYDROCHLORIDE 500 MG/5ML
SOLUTION ORAL
Qty: 1350 ML | Refills: 3 | Status: SHIPPED | OUTPATIENT
Start: 2023-05-17 | End: 2023-05-17 | Stop reason: SDUPTHER

## 2023-05-17 RX ORDER — METFORMIN HYDROCHLORIDE 500 MG/5ML
SOLUTION ORAL
Qty: 1350 ML | Refills: 3 | Status: SHIPPED | OUTPATIENT
Start: 2023-05-17

## 2023-05-17 NOTE — TELEPHONE ENCOUNTER
Patient called requesting a refill   Pt's spouse stated that the patient is taking her metformin 3x a day.  He stated that she is running out to soon     He is wanting to know if Dr. Bertell Curling could send in a script to reflect her taking Metformin 3x a day so that she is not running out of her script too soon    Rx- Metformin     [facility]- 5/16/23  NOV- 8/8/23    Please advise

## 2023-06-05 ENCOUNTER — TELEPHONE (OUTPATIENT)
Dept: ENDOCRINOLOGY | Age: 83
End: 2023-06-05

## 2023-06-05 DIAGNOSIS — E10.65 UNCONTROLLED TYPE 1 DIABETES MELLITUS WITH HYPERGLYCEMIA (HCC): ICD-10-CM

## 2023-06-05 RX ORDER — PEN NEEDLE, DIABETIC 32GX 5/32"
NEEDLE, DISPOSABLE MISCELLANEOUS
Qty: 200 EACH | Refills: 5 | Status: SHIPPED | OUTPATIENT
Start: 2023-06-05

## 2023-06-05 NOTE — TELEPHONE ENCOUNTER
Pt 's  calling and states the pt is using 4 pen needles a day and the rx is wrote for 3 a day. He states she only has enough to last her another 3 days right now. He is asking to change the rx for 120 instead of 100, and its only lasting her about 25 days.  [facility]    [address]# [address] #[phone]

## 2023-07-11 ENCOUNTER — TELEPHONE (OUTPATIENT)
Dept: ENDOCRINOLOGY | Age: 83
End: 2023-07-11

## 2023-08-09 ENCOUNTER — TELEPHONE (OUTPATIENT)
Dept: ENDOCRINOLOGY | Age: 83
End: 2023-08-09

## 2023-09-05 ENCOUNTER — TELEPHONE (OUTPATIENT)
Dept: ENDOCRINOLOGY | Age: 83
End: 2023-09-05

## 2023-09-06 ENCOUNTER — TELEPHONE (OUTPATIENT)
Dept: ENDOCRINOLOGY | Age: 83
End: 2023-09-06

## 2023-09-06 DIAGNOSIS — E10.65 UNCONTROLLED TYPE 1 DIABETES MELLITUS WITH HYPERGLYCEMIA (HCC): Primary | ICD-10-CM

## 2023-09-06 RX ORDER — INSULIN ASPART 100 [IU]/ML
INJECTION, SOLUTION INTRAVENOUS; SUBCUTANEOUS
Qty: 15 ML | Refills: 3 | Status: SHIPPED | OUTPATIENT
Start: 2023-09-06

## 2023-09-06 NOTE — TELEPHONE ENCOUNTER
Pt spouse called in asking for the rx to be refilled              Insulin [facility])        [facility] [address] [place] [address], [place] - [address], [address] [place] [address]   Phone:  [phone]  Fax:  [phone]

## 2023-10-03 ENCOUNTER — TELEPHONE (OUTPATIENT)
Dept: ENDOCRINOLOGY | Age: 83
End: 2023-10-03

## 2023-10-27 ENCOUNTER — TELEPHONE (OUTPATIENT)
Dept: ENDOCRINOLOGY | Age: 83
End: 2023-10-27

## 2023-10-27 DIAGNOSIS — E10.65 UNCONTROLLED TYPE 1 DIABETES MELLITUS WITH HYPERGLYCEMIA (HCC): ICD-10-CM

## 2023-10-27 RX ORDER — PEN NEEDLE, DIABETIC 32GX 5/32"
NEEDLE, DISPOSABLE MISCELLANEOUS
Qty: 200 EACH | Refills: 5 | Status: SHIPPED | OUTPATIENT
Start: 2023-10-27

## 2023-10-27 NOTE — TELEPHONE ENCOUNTER
Pt's  calling for refill request for BD Francine pen needles.  Send to [facility] on [facility]    CB# [address]   5-16-23  FOV   11-15-23

## 2023-11-01 ENCOUNTER — TELEPHONE (OUTPATIENT)
Dept: ENDOCRINOLOGY | Age: 83
End: 2023-11-01

## 2023-11-08 NOTE — PROGRESS NOTES
Jay Vigil is a 79 y.o. 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 Jay Vigil was diagnosed with Diabetes mellitus in March 2019 when she was admitted to the hospital with polyuria polydipsia and weight loss. .  At the time of diagnosis patient had polyuria polydipsia and weight loss aprox 15 lbs . She was admitted to hospital for DKA in march 2019 and that is when she was diagnosed with diabetes , her aic was 14.9 she was discharged home on basal bolus insulin. Patient has been watching her diet closely and does not eat enough due to the fear that her blood glucose are good to go elevated. At the same time she does tend to eat snacks between meals so that she does not have hypoglycemia. She denies any hypoglycemia. --On review of chart in November 2015 she had blood work done and her blood glucose was 88, in February 2017 she had a fasting glucose of 106, again in August 2018 she was noted to have a blood glucose of 106 on her Chem-7  In March 2019 when she was admitted to the hospital with a diabetic ketoacidosis her glucose was 665, sodium 125 and anion gap of 27 with a creatinine of 1.8. She had an A1c of 14.9     Comorbid conditions: none    Current diabetic medications include: lantus 2 units fasting glucose 93 --100    --- Weight loss according to the  she has been slowly losing weight in the last 5 years approximately since 2014 when she weighed around 130 to 140 pounds --denied any significant change in appetite at that time. On review of chart she had lost 16 to 17 pounds from November 2017 to March 2019  --In August 2013 she weighed 117 pounds as per office note so actual weight loss might be approximately 30 pounds since 2013 but it has been gradually. INTERIM:    Diabetes  She presents for her follow-up diabetic visit. She has type 1 diabetes mellitus.  No MedicAlert identification noted. The initial diagnosis of diabetes was made 7 months ago. Her disease course has been improving. There are no hypoglycemic associated symptoms. Associated symptoms include polyphagia and polyuria. Pertinent negatives for diabetes include no weight loss. There are no hypoglycemic complications. Symptoms are improving. There are no diabetic complications. Risk factors for coronary artery disease include hypertension. Current diabetic treatment includes insulin injections. She is compliant with treatment most of the time. Her weight is stable. She is following a generally healthy diet. Meal planning includes avoidance of concentrated sweets. She has not had a previous visit with a dietitian. She rarely participates in exercise. Her breakfast blood glucose is taken between 7-8 am. Her breakfast blood glucose range is generally  mg/dl. Her dinner blood glucose range is generally 140-180 mg/dl. An ACE inhibitor/angiotensin II receptor blocker is being taken. She sees a podiatrist.Eye exam is current.       She has been taking Januvia since May 2021   She notes improvement in her fingerstick blood glucose    she watches her diet too closely and feels that it is very restrictive    Weight trend: stable  Prior visit with dietician: no  Current diet: on average, 3 meals per day  Current exercise: rare    She has hypertension and takes medication   She has been diagnosed with Arthritis and is not on meds     Past Medical History:   Diagnosis Date    Hypertension       Patient Active Problem List   Diagnosis    Essential hypertension    Constipation    Primary osteoarthritis of both knees    Acute renal failure (ARF) ([address]    Uncontrolled type 1 diabetes mellitus with hyperglycemia ([address]    Diabetic ketoacidosis without coma associated with type 1 diabetes mellitus ([address]    Bilateral impacted cerumen    Conductive hearing loss, bilateral     Past Surgical History:   Procedure Laterality Date  CHOLECYSTECTOMY       Social History     Socioeconomic History    Marital status:      Spouse name: Not on file    Number of children: Not on file    Years of education: Not on file    Highest education level: Not on file   Occupational History    Not on file   Tobacco Use    Smoking status: Former Smoker     Packs/day: 0.50     Years: 10.00     Pack years: 5.00     Types: Cigarettes    Smokeless tobacco: Never Used   Vaping Use    Vaping Use: Never used   Substance and Sexual Activity    Alcohol use: No    Drug use: No    Sexual activity: Not on file   Other Topics Concern    Not on file   Social History Narrative    Not on file     Social Determinants of Health     Financial Resource Strain:     Difficulty of Paying Living Expenses:    Food Insecurity:     Worried About [address] in the Last Year:     [address] in the Last Year:    Transportation Needs:     Lack of Transportation (Medical):      Lack of Transportation (Non-Medical):    Physical Activity:     Days of Exercise per Week:     Minutes of Exercise per Session:    Stress:     Feeling of Stress :    Social Connections:     Frequency of Communication with Friends and Family:     Frequency of Social Gatherings with Friends and Family:     Attends Taoist Services:     Active Member of Clubs or Organizations:     Attends Club or Organization Meetings:     Marital Status:    Intimate Partner Violence:     Fear of Current or Ex-Partner:     Emotionally Abused:     Physically Abused:     Sexually Abused:      Family History   Problem Relation Age of Onset    Other Brother         hypoglycemia      Current Outpatient Medications   Medication Sig Dispense Refill    SITagliptin (JANUVIA) 100 MG tablet Take 1 tablet by mouth daily 90 tablet 1    lisinopril (PRINIVIL;ZESTRIL) 5 MG tablet Take 1 tablet by mouth daily 90 tablet 0    ONETOUCH VERIO strip Test blood sugar TID dx: e10.65 300 each 5    OneTouch Delica Lancets 62F MISC Test blood sugar TID dx: e10.65 300 each 5     No current facility-administered medications for this visit. Allergies   Allergen Reactions    Asa [Aspirin]     Codeine     Meloxicam     Penicillins      Family Status   Relation Name Status    Brother  (Not Specified)       Review of Systems  I have reviewed the review of system questionnaire filled by the patient .   Patient was advised to contact PCP for non endocrine signs and symptoms       OBJECTIVE:  BP (!) 146/68   Pulse 62   Resp 16   Ht 5' 5.5\" (1.664 m)   Wt 80 lb (36.3 kg)   BMI 13.11 kg/m²    Wt Readings from Last 3 Encounters:   06/08/21 80 lb (36.3 kg)   05/24/21 78 lb (35.4 kg)   03/22/21 78 lb (35.4 kg)       Constitutional: no acute distress, well appearing, well nourished  Psychiatric: oriented to person, place and time, judgement, insight and normal, recent and remote memory and intact and mood, affect are normal  Skin: skin and subcutaneous tissue is normal without mass,   Head and Face: examination of head and face revealed no abnormalities  Eyes: no lid or conjunctival swelling, no erythema or discharge, pupils are normal,   Ears/Nose: external inspection of ears and nose revealed no abnormalities, hearing is grossly normal  Oropharynx/Mouth/Face: lips, tongue and gums are normal with no lesions, the voice quality was normal  Neck: neck is supple and symmetric, with midline trachea and no masses, thyroid is normal    Pulmonary: no increased work of breathing or signs of respiratory distress, lungs are clear to auscultation  Cardiovascular: normal heart rate and rhythm, normal S1 and S2,   Musculoskeletal: normal gait and station,   Neurological: normal coordination, normal general cortical function          Lab Results   Component Value Date    LABA1C 7.6 05/21/2021    LABA1C 7.0 12/08/2020    LABA1C 7.0 08/03/2020         ASSESSMENT/PLAN:  ----  Type 2 Diabetes ---ELIDA vandana antibody strongly positive C-peptide of 1.8 in October 2019  aic 14>>6.2>>6.3>>6.9>>6.9>>7.0>>7.6  aic worsened since last visit   She is now taking Januvia 50 mg daily   Advised to increase the dose to 100 mg daily  She was advised to increase carbohydrate to 30 g per meal  Started Januvia in May 2021   She notes improvement in glucose levels     Patient was advised that s he has ending of her fingerstick blood glucose logs is crucial in management of her  diabetes. I will adjust the  doses of diabetic medications  according to sent data. Health Maintenance       Last Eye Exam: advised to have annual dilated eye exam. her last eye exam was in 2020   Last Podiatry Exam: Focalin worked last foot exam was 2020 she follows with podiatry every 3 months Dr Doe Murillo: Last LDL level was 68 in feb 2020  patient is not on any medication at the time of the blood work she did not carry the diagnosis of diabetes  Microalbumin/Creatinine Ratio: Normal in feb 2020 . Education: Reviewed ABCs of diabetes management (respective goals in parentheses): A1C (<7), blood pressure (<130/80), and cholesterol (LDL <100). Additional Education: Patient has seen diabetes Educator. 2. Essential hypertension  On medication   I am not sure about compliance with medication low yes maybe    3. Primary osteoarthritis of both knees  Pt denies any significant hypoglycemia     4. Constipation, unspe on hold of the office, currently he will go to swimming pool now cified constipation type  Chronic problem     5. LOW BMI  Patient weight 117 lbs in 2013 but now weighs 88   Gradual weight loss over the years. Today again we again discussed that she needs to consume more calories and more carbohydrates and if required she can take 1 to 2 units of Humalog with the meals she seems to be fearful of the idea of taking insulin we tried to alleviate her anxiety around insulin dosing.   Encouraged to work on diet and exercise and follow-up with the primary care physician for work-up of weight loss if continues          Reviewed and/or ordered clinical lab results yes   Reviewed and/or ordered radiology tests Yes  Reviewed and/or ordered other diagnostic tests yes   Made a decision to obtain old records yes   Reviewed and summarized old records yes     Jonny De La Garza was counseled regarding symptoms of current diagnosis, course and complications of disease if inadequately treated, side effects of medications, diagnosis, treatment options, and prognosis, risks, benefits, complications, and alternatives of treatment, labs, imaging and other studies and treatment targets and goals. She understands instructions and counseling    Return in about 3 months (around 9/8/2021). Please note that some or all of this report was generated using voice recognition software. Please notify me in case of any questions about the content of this document, as some errors in transcription may have occurred . Infliximab Pregnancy And Lactation Text: This medication is Pregnancy Category B and is considered safe during pregnancy. It is unknown if this medication is excreted in breast milk.

## 2023-11-09 ENCOUNTER — TELEPHONE (OUTPATIENT)
Dept: ENDOCRINOLOGY | Age: 83
End: 2023-11-09

## 2023-11-09 DIAGNOSIS — E10.65 UNCONTROLLED TYPE 1 DIABETES MELLITUS WITH HYPERGLYCEMIA (HCC): ICD-10-CM

## 2023-11-09 RX ORDER — PEN NEEDLE, DIABETIC 32GX 5/32"
NEEDLE, DISPOSABLE MISCELLANEOUS
Qty: 400 EACH | Refills: 3 | Status: SHIPPED | OUTPATIENT
Start: 2023-11-09

## 2023-11-09 NOTE — TELEPHONE ENCOUNTER
Returned patients husbands call and advised we have always sent a prescription for 200 needles. We have not changed the prescription on our end. Pts  states they always get 4 boxes and she is going to run out. I advised that she should not run out of needles, she should have plenty. He then states they do not want to go to the pharmacy every 50 days to get a refill. New rx sent for 400 needles.

## 2023-11-09 NOTE — TELEPHONE ENCOUNTER
Pt's  Trenda Primrose calling and states the pt was only given 2 boxes of the BD pen flory needles and she usually get 4 boxes. Pharmacy told him that it was the way the Dr wrote the rx was only to get 2 boxes. He states she will run out by 1st week of December and that she test 4x a day    [facility] [address] (Spouse)   [phone] (Home Phone)

## 2023-11-15 ENCOUNTER — OFFICE VISIT (OUTPATIENT)
Dept: ENDOCRINOLOGY | Age: 83
End: 2023-11-15

## 2023-11-15 ENCOUNTER — HOSPITAL ENCOUNTER (OUTPATIENT)
Age: 83
Discharge: HOME | End: 2023-11-15
Payer: MEDICARE

## 2023-11-15 VITALS
WEIGHT: 96.2 LBS | HEART RATE: 60 BPM | RESPIRATION RATE: 14 BRPM | SYSTOLIC BLOOD PRESSURE: 132 MMHG | HEIGHT: 66 IN | TEMPERATURE: 98 F | DIASTOLIC BLOOD PRESSURE: 60 MMHG | BODY MASS INDEX: 15.46 KG/M2

## 2023-11-15 DIAGNOSIS — N18.31 STAGE 3A CHRONIC KIDNEY DISEASE (HCC): ICD-10-CM

## 2023-11-15 DIAGNOSIS — E13.9 DIABETES MELLITUS TYPE 1.5, MANAGED AS TYPE 2 (HCC): ICD-10-CM

## 2023-11-15 DIAGNOSIS — I10 ESSENTIAL HYPERTENSION: ICD-10-CM

## 2023-11-15 DIAGNOSIS — E11.8 DIABETES MELLITUS TYPE 2 WITH COMPLICATIONS (HCC): Primary | ICD-10-CM

## 2023-11-15 PROBLEM — N18.30 CHRONIC RENAL DISEASE, STAGE III (HCC): Status: ACTIVE | Noted: 2023-11-15

## 2023-11-15 LAB
ALBUMIN SERPL-MCNC: 4.5 G/DL (ref 3.4–5)
ALBUMIN/GLOB SERPL: 1.6 {RATIO} (ref 1.1–2.2)
ALP SERPL-CCNC: 71 U/L (ref 40–129)
ALT SERPL-CCNC: 6 U/L (ref 10–40)
ANION GAP SERPL CALCULATED.3IONS-SCNC: 11 MMOL/L (ref 3–16)
AST SERPL-CCNC: 14 U/L (ref 15–37)
BILIRUB SERPL-MCNC: 0.4 MG/DL (ref 0–1)
BUN SERPL-MCNC: 18 MG/DL (ref 7–20)
CALCIUM SERPL-MCNC: 9.5 MG/DL (ref 8.3–10.6)
CHLORIDE SERPL-SCNC: 102 MMOL/L (ref 99–110)
CHOLEST SERPL-MCNC: 178 MG/DL (ref 0–199)
CO2 SERPL-SCNC: 24 MMOL/L (ref 21–32)
CREAT SERPL-MCNC: 0.9 MG/DL (ref 0.6–1.2)
CREAT UR-MCNC: 56.9 MG/DL (ref 28–259)
GFR SERPLBLD CREATININE-BSD FMLA CKD-EPI: >60 ML/MIN/{1.73_M2}
GLUCOSE SERPL-MCNC: 148 MG/DL (ref 70–99)
HDLC SERPL-MCNC: 70 MG/DL (ref 40–60)
LDLC SERPL CALC-MCNC: 97 MG/DL
MICROALBUMIN UR DL<=1MG/L-MCNC: 2.5 MG/DL
MICROALBUMIN/CREAT UR: 43.9 MG/G (ref 0–30)
POTASSIUM SERPL-SCNC: 4.9 MMOL/L (ref 3.5–5.1)
PROT SERPL-MCNC: 7.3 G/DL (ref 6.4–8.2)
SODIUM SERPL-SCNC: 137 MMOL/L (ref 136–145)
TRIGL SERPL-MCNC: 53 MG/DL (ref 0–150)
TSH SERPL DL<=0.005 MIU/L-ACNC: 3.66 UIU/ML (ref 0.27–4.2)
VLDLC SERPL CALC-MCNC: 11 MG/DL

## 2023-11-15 PROCEDURE — 36415 COLL VENOUS BLD VENIPUNCTURE: CPT

## 2023-11-15 PROCEDURE — 83036 HEMOGLOBIN GLYCOSYLATED A1C: CPT

## 2023-11-15 PROCEDURE — 84443 ASSAY THYROID STIM HORMONE: CPT

## 2023-11-15 PROCEDURE — 82570 ASSAY OF URINE CREATININE: CPT

## 2023-11-15 PROCEDURE — 80061 LIPID PANEL: CPT

## 2023-11-15 PROCEDURE — 80053 COMPREHEN METABOLIC PANEL: CPT

## 2023-11-15 PROCEDURE — 82043 UR ALBUMIN QUANTITATIVE: CPT

## 2023-11-15 NOTE — PROGRESS NOTES
Primary osteoarthritis of both knees  Pt denies any significant hypoglycemia     ----Constipation,Chronic problem     --- LOW BMI  Patient weight 117 lbs in 2013 but got down  88 >>79 >> now 106     Gradual weight loss over the years. Today again we again discussed that she needs to consume more calories and more carbohydrates and if required she can take 1 to 2 units of Humalog with the meals she seems to be fearful of the idea of taking insulin we tried to alleviate her anxiety around insulin dosing. Encouraged to work on diet and exercise and follow-up with the primary care physician for work-up of weight loss if continues. Reviewed and/or ordered clinical lab results yes   Reviewed and/or ordered radiology tests Yes  Reviewed and/or ordered other diagnostic tests yes   Made a decision to obtain old records yes   Reviewed and summarized old records yes     Dionne Bond was counseled regarding symptoms of current diagnosis, course and complications of disease if inadequately treated, side effects of medications, diagnosis, treatment options, and prognosis, risks, benefits, complications, and alternatives of treatment, labs, imaging and other studies and treatment targets and goals. She understands instructions and counseling. I spent  30 + minutes which includes reviewing patient chart , interpreting previous lab results  , discussing and providing counseling and coordinating care of patient's multiple health issues with  the patient. Return in about 6 months (around 5/15/2024). Please note that some or all of this report was generated using voice recognition software. Please notify me in case of any questions about the content of this document, as some errors in transcription may have occurred .

## 2023-11-16 LAB
EST. AVERAGE GLUCOSE BLD GHB EST-MCNC: 157.1 MG/DL
HBA1C MFR BLD: 7.1 %

## 2023-11-20 ENCOUNTER — TELEPHONE (OUTPATIENT)
Dept: ENDOCRINOLOGY | Age: 83
End: 2023-11-20

## 2023-11-20 NOTE — TELEPHONE ENCOUNTER
Pt's  calling to let us know that they received a letter in the mail from the insurance stating that starting 1/1/24 insurance will no longer cover Novolog and the alternatives are either Admelog or Fiasp.   states that pt has enough Novolog to last a couple more months but just wanted to let us know    # [phone]

## 2023-12-13 ENCOUNTER — TELEPHONE (OUTPATIENT)
Dept: ENDOCRINOLOGY | Age: 83
End: 2023-12-13

## 2024-01-15 ENCOUNTER — TELEPHONE (OUTPATIENT)
Dept: ENDOCRINOLOGY | Age: 84
End: 2024-01-15

## 2024-01-15 NOTE — TELEPHONE ENCOUNTER
Call from pt's spouse Pa stating that they recently turned in pt's Diabetes log but havent received a call from the office regarding the pt's BS    Pa is wanting to know if the pt's BS is normal or does she needs to make any changes?    Please advise   CB# [phone]

## 2024-02-09 ENCOUNTER — TELEPHONE (OUTPATIENT)
Dept: ENDOCRINOLOGY | Age: 84
End: 2024-02-09

## 2024-02-12 PROBLEM — N18.30 CHRONIC RENAL DISEASE, STAGE III (HCC): Status: RESOLVED | Noted: 2023-11-15 | Resolved: 2024-02-12

## 2024-03-06 ENCOUNTER — TELEPHONE (OUTPATIENT)
Dept: ENDOCRINOLOGY | Age: 84
End: 2024-03-06

## 2024-03-07 DIAGNOSIS — E10.65 UNCONTROLLED TYPE 1 DIABETES MELLITUS WITH HYPERGLYCEMIA (HCC): ICD-10-CM

## 2024-03-07 RX ORDER — INSULIN GLARGINE 100 [IU]/ML
INJECTION, SOLUTION SUBCUTANEOUS
Qty: 15 ML | Refills: 3 | Status: SHIPPED | OUTPATIENT
Start: 2024-03-07

## 2024-03-08 ENCOUNTER — TELEPHONE (OUTPATIENT)
Dept: ENDOCRINOLOGY | Age: 84
End: 2024-03-08

## 2024-03-08 NOTE — TELEPHONE ENCOUNTER
Fax from [employer]    Pt has been provided a temporary supply of Novolog Inj Flexpen.    This drug is either not included on our list of cov'd drugs or has certain limits

## 2024-03-20 ENCOUNTER — TELEPHONE (OUTPATIENT)
Dept: ENDOCRINOLOGY | Age: 84
End: 2024-03-20

## 2024-03-20 NOTE — TELEPHONE ENCOUNTER
Pt's  Vernon calling and states pt's sugars have been in the 150-180 range the last 2-3 weeks. He states it was 174 at bedtime last night and this morning it was 174 again in the morning. He states its been creeping up slowly    He states pt takes:  6 units Basaglar w/ breakfast  5 units of Novolog w/ lunch  5 units of Novolog w/ dinner    #[phone]  Pa

## 2024-03-20 NOTE — TELEPHONE ENCOUNTER
Please advise patient to increase long-acting insulin by 2 units and start sending glucose logs on every 2 weekly basis

## 2024-03-29 DIAGNOSIS — E10.65 UNCONTROLLED TYPE 1 DIABETES MELLITUS WITH HYPERGLYCEMIA (HCC): ICD-10-CM

## 2024-03-29 RX ORDER — BLOOD SUGAR DIAGNOSTIC
STRIP MISCELLANEOUS
Qty: 300 STRIP | Refills: 5 | Status: SHIPPED | OUTPATIENT
Start: 2024-03-29

## 2024-04-03 ENCOUNTER — TELEPHONE (OUTPATIENT)
Dept: ENDOCRINOLOGY | Age: 84
End: 2024-04-03

## 2024-04-03 NOTE — TELEPHONE ENCOUNTER
Please advise patient that I have reviewed their fingerstick glucose logs and no changes in diabetic regimen is suggested at this time.  Will patient be agreeable to get a continuous glucose sensor?  We can even do office-based CGM if she does not want her own

## 2024-04-05 ENCOUNTER — TELEPHONE (OUTPATIENT)
Dept: ENDOCRINOLOGY | Age: 84
End: 2024-04-05

## 2024-04-05 DIAGNOSIS — E10.65 UNCONTROLLED TYPE 1 DIABETES MELLITUS WITH HYPERGLYCEMIA (HCC): ICD-10-CM

## 2024-04-05 RX ORDER — LANCETS 33 GAUGE
EACH MISCELLANEOUS
Qty: 300 EACH | Refills: 5 | Status: SHIPPED | OUTPATIENT
Start: 2024-04-05

## 2024-04-05 NOTE — TELEPHONE ENCOUNTER
Pt spouse asking for refill      OneTouch Delica Lancets 33G MISC       [id number] [place], [place] - [address] - P [phone] - F [phone]  [address] [place] [address]  Phone: [phone]  Fax: [phone]

## 2024-05-01 ENCOUNTER — TELEPHONE (OUTPATIENT)
Dept: ENDOCRINOLOGY | Age: 84
End: 2024-05-01

## 2024-05-01 NOTE — TELEPHONE ENCOUNTER
Diabetes log 4/3-4/30/24  
Patients  aware.   
Please advise patient that I have reviewed their fingerstick glucose logs and no changes in diabetic regimen is suggested at this time.    
ADD ADDITIONAL PRIOR PREGNANCY INFORMATION...

## 2024-05-06 ENCOUNTER — HOSPITAL ENCOUNTER (OUTPATIENT)
Age: 84
Discharge: HOME | End: 2024-05-06
Payer: MEDICARE

## 2024-05-06 DIAGNOSIS — E13.9 DIABETES MELLITUS TYPE 1.5, MANAGED AS TYPE 2 (HCC): ICD-10-CM

## 2024-05-06 DIAGNOSIS — I10 ESSENTIAL HYPERTENSION: ICD-10-CM

## 2024-05-06 LAB
ALBUMIN SERPL-MCNC: 4.3 G/DL (ref 3.4–5)
ALBUMIN/GLOB SERPL: 1.3 {RATIO} (ref 1.1–2.2)
ALP SERPL-CCNC: 92 U/L (ref 40–129)
ALT SERPL-CCNC: 8 U/L (ref 10–40)
ANION GAP SERPL CALCULATED.3IONS-SCNC: 14 MMOL/L (ref 3–16)
AST SERPL-CCNC: 14 U/L (ref 15–37)
BILIRUB SERPL-MCNC: 0.4 MG/DL (ref 0–1)
BUN SERPL-MCNC: 15 MG/DL (ref 7–20)
CALCIUM SERPL-MCNC: 9 MG/DL (ref 8.3–10.6)
CHLORIDE SERPL-SCNC: 103 MMOL/L (ref 99–110)
CHOLEST SERPL-MCNC: 161 MG/DL (ref 0–199)
CO2 SERPL-SCNC: 22 MMOL/L (ref 21–32)
CREAT SERPL-MCNC: 0.8 MG/DL (ref 0.6–1.2)
CREAT UR-MCNC: 42.9 MG/DL (ref 28–259)
GFR SERPLBLD CREATININE-BSD FMLA CKD-EPI: 72 ML/MIN/{1.73_M2}
GLUCOSE SERPL-MCNC: 196 MG/DL (ref 70–99)
HDLC SERPL-MCNC: 75 MG/DL (ref 40–60)
LDLC SERPL CALC-MCNC: 75 MG/DL
MICROALBUMIN UR DL<=1MG/L-MCNC: 3.5 MG/DL
MICROALBUMIN/CREAT UR: 81.6 MG/G (ref 0–30)
POTASSIUM SERPL-SCNC: 4.2 MMOL/L (ref 3.5–5.1)
PROT SERPL-MCNC: 7.5 G/DL (ref 6.4–8.2)
SODIUM SERPL-SCNC: 139 MMOL/L (ref 136–145)
TRIGL SERPL-MCNC: 57 MG/DL (ref 0–150)
TSH SERPL DL<=0.005 MIU/L-ACNC: 3.41 UIU/ML (ref 0.27–4.2)
VLDLC SERPL CALC-MCNC: 11 MG/DL

## 2024-05-06 PROCEDURE — 82570 ASSAY OF URINE CREATININE: CPT

## 2024-05-06 PROCEDURE — 80053 COMPREHEN METABOLIC PANEL: CPT

## 2024-05-06 PROCEDURE — 36415 COLL VENOUS BLD VENIPUNCTURE: CPT

## 2024-05-06 PROCEDURE — 84443 ASSAY THYROID STIM HORMONE: CPT

## 2024-05-06 PROCEDURE — 83036 HEMOGLOBIN GLYCOSYLATED A1C: CPT

## 2024-05-06 PROCEDURE — 82043 UR ALBUMIN QUANTITATIVE: CPT

## 2024-05-06 PROCEDURE — 80061 LIPID PANEL: CPT

## 2024-05-07 LAB
EST. AVERAGE GLUCOSE BLD GHB EST-MCNC: 134.1 MG/DL
HBA1C MFR BLD: 6.3 %

## 2024-05-13 PROBLEM — N39.41 URGE INCONTINENCE OF URINE: Status: ACTIVE | Noted: 2024-05-13

## 2024-05-13 PROBLEM — R80.9 PROTEINURIA: Status: ACTIVE | Noted: 2024-05-13

## 2024-05-15 ENCOUNTER — OFFICE VISIT (OUTPATIENT)
Dept: ENDOCRINOLOGY | Age: 84
End: 2024-05-15
Payer: MEDICARE

## 2024-05-15 VITALS
BODY MASS INDEX: 16.84 KG/M2 | WEIGHT: 104.8 LBS | SYSTOLIC BLOOD PRESSURE: 151 MMHG | HEART RATE: 79 BPM | RESPIRATION RATE: 16 BRPM | DIASTOLIC BLOOD PRESSURE: 67 MMHG | HEIGHT: 66 IN

## 2024-05-15 DIAGNOSIS — N18.31 STAGE 3A CHRONIC KIDNEY DISEASE (HCC): ICD-10-CM

## 2024-05-15 DIAGNOSIS — I10 ESSENTIAL HYPERTENSION: ICD-10-CM

## 2024-05-15 DIAGNOSIS — E10.65 UNCONTROLLED TYPE 1 DIABETES MELLITUS WITH HYPERGLYCEMIA (HCC): Primary | ICD-10-CM

## 2024-05-15 LAB
ANION GAP SERPL CALCULATED.3IONS-SCNC: 13 MMOL/L (ref 3–16)
ANISOCYTOSIS BLD QL SMEAR: ABNORMAL
BASOPHILS # BLD: 0 K/UL (ref 0–0.2)
BASOPHILS NFR BLD: 0.4 %
BUN SERPL-MCNC: 20 MG/DL (ref 7–20)
BURR CELLS BLD QL SMEAR: ABNORMAL
CALCIUM SERPL-MCNC: 9.5 MG/DL (ref 8.3–10.6)
CHLORIDE SERPL-SCNC: 103 MMOL/L (ref 99–110)
CO2 SERPL-SCNC: 26 MMOL/L (ref 21–32)
CREAT SERPL-MCNC: 1 MG/DL (ref 0.6–1.2)
DACRYOCYTES BLD QL SMEAR: ABNORMAL
DEPRECATED RDW RBC AUTO: 20 % (ref 12.4–15.4)
EOSINOPHIL # BLD: 0 K/UL (ref 0–0.6)
EOSINOPHIL NFR BLD: 0.9 %
GFR SERPLBLD CREATININE-BSD FMLA CKD-EPI: 55 ML/MIN/{1.73_M2}
GLUCOSE SERPL-MCNC: 100 MG/DL (ref 70–99)
HCT VFR BLD AUTO: 21.6 % (ref 36–48)
HGB BLD-MCNC: 6.5 G/DL (ref 12–16)
HYPOCHROMIA BLD QL SMEAR: ABNORMAL
LYMPHOCYTES # BLD: 1.3 K/UL (ref 1–5.1)
LYMPHOCYTES NFR BLD: 34.5 %
MCH RBC QN AUTO: 20 PG (ref 26–34)
MCHC RBC AUTO-ENTMCNC: 30 G/DL (ref 31–36)
MCV RBC AUTO: 66.9 FL (ref 80–100)
MONOCYTES # BLD: 0.7 K/UL (ref 0–1.3)
MONOCYTES NFR BLD: 17.9 %
NEUTROPHILS # BLD: 1.8 K/UL (ref 1.7–7.7)
NEUTROPHILS NFR BLD: 46.3 %
OVALOCYTES BLD QL SMEAR: ABNORMAL
PATH INTERP BLD-IMP: YES
PLATELET # BLD AUTO: 198 K/UL (ref 135–450)
PMV BLD AUTO: 8.4 FL (ref 5–10.5)
POTASSIUM SERPL-SCNC: 4.3 MMOL/L (ref 3.5–5.1)
RBC # BLD AUTO: 3.24 M/UL (ref 4–5.2)
SCHISTOCYTES BLD QL SMEAR: ABNORMAL
SLIDE REVIEW: ABNORMAL
SODIUM SERPL-SCNC: 142 MMOL/L (ref 136–145)
WBC # BLD AUTO: 3.8 K/UL (ref 4–11)

## 2024-05-15 PROCEDURE — 3078F DIAST BP <80 MM HG: CPT | Performed by: INTERNAL MEDICINE

## 2024-05-15 PROCEDURE — G2211 COMPLEX E/M VISIT ADD ON: HCPCS | Performed by: INTERNAL MEDICINE

## 2024-05-15 PROCEDURE — 3077F SYST BP >= 140 MM HG: CPT | Performed by: INTERNAL MEDICINE

## 2024-05-15 PROCEDURE — 99214 OFFICE O/P EST MOD 30 MIN: CPT | Performed by: INTERNAL MEDICINE

## 2024-05-15 PROCEDURE — 1123F ACP DISCUSS/DSCN MKR DOCD: CPT | Performed by: INTERNAL MEDICINE

## 2024-05-15 PROCEDURE — 3044F HG A1C LEVEL LT 7.0%: CPT | Performed by: INTERNAL MEDICINE

## 2024-05-15 NOTE — PROGRESS NOTES
Herman Mccarthy is a 83 y.o. 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   Herman Mccarthy was diagnosed with Diabetes mellitus in March 2019 when she was admitted to the hospital with polyuria polydipsia and weight loss..  At the time of diagnosis patient had polyuria polydipsia and weight loss aprox 15 lbs . She was admitted to hospital for DKA in march 2019 and that is when she was diagnosed with diabetes , her aic was 14.9 she was discharged home on basal bolus insulin.  Patient has been watching her diet closely and does not eat enough due to the fear that her blood glucose are good to go elevated.  At the same time she does tend to eat snacks between meals so that she does not have hypoglycemia.  She denies any hypoglycemia.  --On review of chart in November 2015 she had blood work done and her blood glucose was 88, in February 2017 she had a fasting glucose of 106, again in August 2018 she was noted to have a blood glucose of 106 on her Chem-7  In March 2019 when she was admitted to the hospital with a diabetic ketoacidosis her glucose was 665, sodium 125 and anion gap of 27 with a creatinine of 1.8.  She had an A1c of 14.9    --- Weight loss according to the  she has been slowly losing weight in the last 5 years approximately since 2014 when she weighed around 130 to 140 pounds --denied any significant change in appetite at that time.  On review of chart she had lost 16 to 17 pounds from November 2017 to March 2019  --In August 2013 she weighed 117 pounds as per office note so actual weight loss might be approximately 30 pounds since 2013 but it has been gradually.    She has hypertension and takes medication and at home blood pressure runs better  She has  Arthritis and is not on meds.    INTERIM:    Diabetes  She presents for her follow-up diabetic visit. She has type 1 diabetes mellitus. No

## 2024-05-16 ENCOUNTER — HOSPITAL ENCOUNTER (INPATIENT)
Age: 84
LOS: 2 days | Discharge: HOME | DRG: 811 | End: 2024-05-18
Attending: INTERNAL MEDICINE | Admitting: INTERNAL MEDICINE
Payer: MEDICARE

## 2024-05-16 DIAGNOSIS — D64.9 ANEMIA, UNSPECIFIED TYPE: ICD-10-CM

## 2024-05-16 PROBLEM — I16.0 HYPERTENSIVE URGENCY: Status: ACTIVE | Noted: 2024-05-16

## 2024-05-16 PROBLEM — E10.65 TYPE 1 DIABETES MELLITUS WITH HYPERGLYCEMIA (HCC): Status: ACTIVE | Noted: 2024-05-16

## 2024-05-16 PROBLEM — D50.9 MICROCYTIC HYPOCHROMIC ANEMIA: Status: ACTIVE | Noted: 2024-05-16

## 2024-05-16 PROBLEM — R64 CACHEXIA (HCC): Status: ACTIVE | Noted: 2024-05-16

## 2024-05-16 LAB
ABO + RH BLD: NORMAL
BLD GP AB SCN SERPL QL: NORMAL
BLOOD BANK DISPENSE STATUS: NORMAL
BLOOD BANK PRODUCT CODE: NORMAL
BPU ID: NORMAL
DESCRIPTION BLOOD BANK: NORMAL
EST. AVERAGE GLUCOSE BLD GHB EST-MCNC: 137 MG/DL
FERRITIN SERPL IA-MCNC: 10.9 NG/ML (ref 15–150)
GLUCOSE BLD-MCNC: 244 MG/DL (ref 70–99)
HBA1C MFR BLD: 6.4 %
IRON SATN MFR SERPL: 4 % (ref 15–50)
IRON SERPL-MCNC: 17 UG/DL (ref 37–145)
PATH INTERP BLD-IMP: NORMAL
PERFORMED ON: ABNORMAL
TIBC SERPL-MCNC: 418 UG/DL (ref 260–445)

## 2024-05-16 PROCEDURE — 6370000000 HC RX 637 (ALT 250 FOR IP): Performed by: INTERNAL MEDICINE

## 2024-05-16 PROCEDURE — 36415 COLL VENOUS BLD VENIPUNCTURE: CPT

## 2024-05-16 PROCEDURE — 86900 BLOOD TYPING SEROLOGIC ABO: CPT

## 2024-05-16 PROCEDURE — P9016 RBC LEUKOCYTES REDUCED: HCPCS

## 2024-05-16 PROCEDURE — 83540 ASSAY OF IRON: CPT

## 2024-05-16 PROCEDURE — 86901 BLOOD TYPING SEROLOGIC RH(D): CPT

## 2024-05-16 PROCEDURE — 86923 COMPATIBILITY TEST ELECTRIC: CPT

## 2024-05-16 PROCEDURE — 86850 RBC ANTIBODY SCREEN: CPT

## 2024-05-16 PROCEDURE — 83550 IRON BINDING TEST: CPT

## 2024-05-16 PROCEDURE — 1200000000 HC SEMI PRIVATE

## 2024-05-16 PROCEDURE — 30233N1 TRANSFUSION OF NONAUTOLOGOUS RED BLOOD CELLS INTO PERIPHERAL VEIN, PERCUTANEOUS APPROACH: ICD-10-PCS | Performed by: INTERNAL MEDICINE

## 2024-05-16 PROCEDURE — 82728 ASSAY OF FERRITIN: CPT

## 2024-05-16 RX ORDER — CLONIDINE HYDROCHLORIDE 0.1 MG/1
0.1 TABLET ORAL ONCE
Status: COMPLETED | OUTPATIENT
Start: 2024-05-16 | End: 2024-05-16

## 2024-05-16 RX ORDER — AMLODIPINE BESYLATE 5 MG/1
5 TABLET ORAL ONCE
Status: COMPLETED | OUTPATIENT
Start: 2024-05-16 | End: 2024-05-16

## 2024-05-16 RX ORDER — CLONIDINE HYDROCHLORIDE 0.1 MG/1
0.1 TABLET ORAL ONCE
Status: DISCONTINUED | OUTPATIENT
Start: 2024-05-16 | End: 2024-05-18 | Stop reason: HOSPADM

## 2024-05-16 RX ORDER — DEXTROSE MONOHYDRATE 100 MG/ML
INJECTION, SOLUTION INTRAVENOUS CONTINUOUS PRN
Status: DISCONTINUED | OUTPATIENT
Start: 2024-05-16 | End: 2024-05-16

## 2024-05-16 RX ORDER — CLONIDINE HYDROCHLORIDE 0.1 MG/1
0.1 TABLET ORAL EVERY 4 HOURS PRN
Status: DISCONTINUED | OUTPATIENT
Start: 2024-05-16 | End: 2024-05-18 | Stop reason: HOSPADM

## 2024-05-16 RX ORDER — AMLODIPINE BESYLATE 5 MG/1
5 TABLET ORAL DAILY
Status: DISCONTINUED | OUTPATIENT
Start: 2024-05-17 | End: 2024-05-18 | Stop reason: HOSPADM

## 2024-05-16 RX ORDER — GLUCAGON 1 MG/ML
1 KIT INJECTION PRN
Status: DISCONTINUED | OUTPATIENT
Start: 2024-05-16 | End: 2024-05-16

## 2024-05-16 RX ORDER — SODIUM CHLORIDE 9 MG/ML
INJECTION, SOLUTION INTRAVENOUS PRN
Status: DISCONTINUED | OUTPATIENT
Start: 2024-05-16 | End: 2024-05-18 | Stop reason: HOSPADM

## 2024-05-16 RX ORDER — DEXTROSE MONOHYDRATE 100 MG/ML
INJECTION, SOLUTION INTRAVENOUS CONTINUOUS PRN
Status: DISCONTINUED | OUTPATIENT
Start: 2024-05-16 | End: 2024-05-18 | Stop reason: HOSPADM

## 2024-05-16 RX ORDER — LISINOPRIL 10 MG/1
10 TABLET ORAL DAILY
Status: DISCONTINUED | OUTPATIENT
Start: 2024-05-16 | End: 2024-05-18 | Stop reason: HOSPADM

## 2024-05-16 RX ORDER — INSULIN LISPRO 100 [IU]/ML
0-8 INJECTION, SOLUTION INTRAVENOUS; SUBCUTANEOUS
Status: DISCONTINUED | OUTPATIENT
Start: 2024-05-17 | End: 2024-05-18 | Stop reason: HOSPADM

## 2024-05-16 RX ORDER — PEG-3350, SODIUM SULFATE, SODIUM CHLORIDE, POTASSIUM CHLORIDE, SODIUM ASCORBATE AND ASCORBIC ACID 7.5-2.691G
100 KIT ORAL ONCE
Status: COMPLETED | OUTPATIENT
Start: 2024-05-16 | End: 2024-05-16

## 2024-05-16 RX ORDER — GLUCAGON 1 MG/ML
1 KIT INJECTION PRN
Status: DISCONTINUED | OUTPATIENT
Start: 2024-05-16 | End: 2024-05-18 | Stop reason: HOSPADM

## 2024-05-16 RX ORDER — INSULIN LISPRO 100 [IU]/ML
0-4 INJECTION, SOLUTION INTRAVENOUS; SUBCUTANEOUS NIGHTLY
Status: DISCONTINUED | OUTPATIENT
Start: 2024-05-16 | End: 2024-05-18 | Stop reason: HOSPADM

## 2024-05-16 RX ORDER — INSULIN GLARGINE 100 [IU]/ML
8 INJECTION, SOLUTION SUBCUTANEOUS NIGHTLY
Status: DISCONTINUED | OUTPATIENT
Start: 2024-05-17 | End: 2024-05-18 | Stop reason: HOSPADM

## 2024-05-16 RX ADMIN — LISINOPRIL 10 MG: 10 TABLET ORAL at 18:49

## 2024-05-16 RX ADMIN — PEG-3350, SODIUM SULFATE, SODIUM CHLORIDE, POTASSIUM CHLORIDE, SODIUM ASCORBATE AND ASCORBIC ACID 100 G: KIT at 22:04

## 2024-05-16 RX ADMIN — AMLODIPINE BESYLATE 5 MG: 5 TABLET ORAL at 18:52

## 2024-05-16 RX ADMIN — PEG-3350, SODIUM SULFATE, SODIUM CHLORIDE, POTASSIUM CHLORIDE, SODIUM ASCORBATE AND ASCORBIC ACID 100 G: KIT at 20:22

## 2024-05-16 RX ADMIN — CLONIDINE HYDROCHLORIDE 0.1 MG: 0.1 TABLET ORAL at 18:55

## 2024-05-16 RX ADMIN — METFORMIN HYDROCHLORIDE 500 MG: 500 TABLET ORAL at 20:22

## 2024-05-16 ASSESSMENT — PAIN SCALES - GENERAL: PAINLEVEL_OUTOF10: 0

## 2024-05-16 NOTE — PROGRESS NOTES
/77. Retook BP manually. /78 manually. Messaged Dr. Varner regarding if unit of PRBC should be given with BP this high. Obtained order for one time order of 5 mg amlodipine. Also obtained order for one time order of 0.1 mg clonidine. Scheduled lisinopril, amlodipine, and clonidine administered. Dr. Varner stated regarding giving the unit of PRBC to \"just wait till it goes below 170 at least.\" Educated patient on lisinopril, amlodipine, and clonidine. Patient verbalized understanding. Patient is currently sitting up in bed,  at bedside. Call light within reach. No further needs expressed.

## 2024-05-16 NOTE — CONSULTS
Gastrointestinal Consult Note    Patient: Herman Mccarthy CSN: [id number]     YOB: 1940  Age: 83 y.o.  Sex: female    Unit: [address] ONCOLOGY Room/Bed: 5TN-5585/5585-01 Location: [facility]     Admitting Physician: ENOCH VARNER    Date of  Admission: 5/16/2024   Admission type: Elective  Primary Care Physician: Enoch Varner MD          Referring Physician: [unfilled]    Chief Complaint: Anemia  Consult Date: 5/16/2024     Subjective:     History of Present Illness: Herman Mccarthy is a 83 y.o. female who is seen at the request of ENOCH VARNER for anemia.    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    Past Medical History:   Diagnosis Date    Diabetes mellitus (HCC)     Hypertension      Past Surgical History:   Procedure Laterality Date    CHOLECYSTECTOMY      COLECTOMY N/A 8/25/2021    LAPAROSCOPIC ASSISTED TRANSVERSE COLON RESECTION performed by Mayank Maradiaga MD at [facility] OR    COLONOSCOPY N/A 6/21/2021    COLONOSCOPY WITH BIOPSY performed by Ruel Caraballo MD at [facility] ENDOSCOPY    COLONOSCOPY  6/21/2021    COLONOSCOPY SUBMUCOSAL/BOTOX INJECTION performed by Ruel Caraballo MD at [facility] ENDOSCOPY    COLONOSCOPY  6/21/2021    COLONOSCOPY POLYPECTOMY SNARE/COLD BIOPSY performed by Ruel Caraballo MD at [facility] ENDOSCOPY      Family History   Problem Relation Age of Onset    Other Brother         hypoglycemia      Social History     Tobacco  Use    Smoking status: Former     Current packs/day: 0.50     Average packs/day: 0.5 packs/day for 10.0 years (5.0 ttl pk-yrs)     Types: Cigarettes    Smokeless tobacco: Never   Substance Use Topics    Alcohol use: No      Allergies   Allergen Reactions    Asa [Aspirin]      nausea    Codeine      dizzy    Meloxicam      Not sure of reaction    Penicillins      rash     Prior to Admission medications    Medication Sig Start Date End Date Taking? Authorizing Provider   Insulin Aspart (NOVOLOG FLEXPEN SC) Inject into the skin    ProviderLisette MD   lisinopril (PRINIVIL;ZESTRIL) 10 MG tablet Take 1 tablet by mouth daily 5/13/24 8/11/24  Enoch Varner MD OneTouch Delica Lancets 33G MISC TEST BLOOD SUGAR 3 TIMES A DAY DX: E10.65 4/5/24   Sangeetha Villegas MD ONETOUCH VERIO strip TEST BLOOD SUGAR 3 TIMES A DAY 3/29/24   Sangeetha Villegas MD   insulin glargine (BASAGLAR KWIKPEN) 100 UNIT/ML injection pen 6 units daily.  Patient taking differently: 8 units daily. 3/7/24   Sangeetha Villegas MD   BD PEN NEEDLE MIHAELA 2ND GEN 32G X 4 MM MISC Use to inject insulin 4 times daily. 11/9/23   Sangeetha Villegas MD   nystatin-triamcinolone (MYCOLOG II) 514810-6.1 UNIT/GM-% cream Apply topically 2 times daily. 8/8/23   Enoch Varner MD   metFORMIN HCl (RIOMET) 500 MG/5ML SOLN Take 5 ml TID 5/17/23   Sangeetha Villegas MD        Review of Systems:    10 point review systems was asked and was negative    Objective:     Physical Exam:  There were no vitals taken for this visit.     General: Elderly female who does appear to be thin, White (non-), female  HEENT: Normocephalic, atraumatic, sclera anicteric, mucosal membranes moist  Cardiovascular: Regular rate and rhythm.  Respiratory:  Clear to auscultation in the anterior lung fields bilaterally  GI:  Abdomen nondistended, soft, and nontender.  Normal active bowel sounds. No enlargement of the liver or spleen. No masses palpable.  Rectal:  Deferred  Musculoskeletal:  No pitting

## 2024-05-17 ENCOUNTER — ANESTHESIA EVENT (OUTPATIENT)
Dept: ENDOSCOPY | Age: 84
End: 2024-05-17
Payer: MEDICARE

## 2024-05-17 ENCOUNTER — ANESTHESIA (OUTPATIENT)
Dept: ENDOSCOPY | Age: 84
End: 2024-05-17
Payer: MEDICARE

## 2024-05-17 LAB
ANION GAP SERPL CALCULATED.3IONS-SCNC: 14 MMOL/L (ref 3–16)
BLOOD BANK DISPENSE STATUS: NORMAL
BLOOD BANK DISPENSE STATUS: NORMAL
BLOOD BANK PRODUCT CODE: NORMAL
BLOOD BANK PRODUCT CODE: NORMAL
BPU ID: NORMAL
BPU ID: NORMAL
BUN SERPL-MCNC: 24 MG/DL (ref 7–20)
CALCIUM SERPL-MCNC: 9.2 MG/DL (ref 8.3–10.6)
CHLORIDE SERPL-SCNC: 104 MMOL/L (ref 99–110)
CO2 SERPL-SCNC: 23 MMOL/L (ref 21–32)
CREAT SERPL-MCNC: 0.9 MG/DL (ref 0.6–1.2)
DEPRECATED RDW RBC AUTO: 21.8 % (ref 12.4–15.4)
DESCRIPTION BLOOD BANK: NORMAL
DESCRIPTION BLOOD BANK: NORMAL
EST. AVERAGE GLUCOSE BLD GHB EST-MCNC: 134.1 MG/DL
GFR SERPLBLD CREATININE-BSD FMLA CKD-EPI: 63 ML/MIN/{1.73_M2}
GLUCOSE BLD-MCNC: 145 MG/DL (ref 70–99)
GLUCOSE BLD-MCNC: 152 MG/DL (ref 70–99)
GLUCOSE BLD-MCNC: 163 MG/DL (ref 70–99)
GLUCOSE BLD-MCNC: 181 MG/DL (ref 70–99)
GLUCOSE BLD-MCNC: 257 MG/DL (ref 70–99)
GLUCOSE SERPL-MCNC: 145 MG/DL (ref 70–99)
HBA1C MFR BLD: 6.3 %
HCT VFR BLD AUTO: 23.1 % (ref 36–48)
HGB BLD-MCNC: 7.5 G/DL (ref 12–16)
MCH RBC QN AUTO: 22.1 PG (ref 26–34)
MCHC RBC AUTO-ENTMCNC: 32.5 G/DL (ref 31–36)
MCV RBC AUTO: 68.2 FL (ref 80–100)
PATH INTERP BLD-IMP: NO
PERFORMED ON: ABNORMAL
PLATELET # BLD AUTO: 165 K/UL (ref 135–450)
PMV BLD AUTO: 7.9 FL (ref 5–10.5)
POTASSIUM SERPL-SCNC: 3.9 MMOL/L (ref 3.5–5.1)
RBC # BLD AUTO: 3.38 M/UL (ref 4–5.2)
SODIUM SERPL-SCNC: 141 MMOL/L (ref 136–145)
WBC # BLD AUTO: 3.6 K/UL (ref 4–11)

## 2024-05-17 PROCEDURE — 83036 HEMOGLOBIN GLYCOSYLATED A1C: CPT

## 2024-05-17 PROCEDURE — 0DBL8ZX EXCISION OF TRANSVERSE COLON, VIA NATURAL OR ARTIFICIAL OPENING ENDOSCOPIC, DIAGNOSTIC: ICD-10-PCS | Performed by: INTERNAL MEDICINE

## 2024-05-17 PROCEDURE — 0DBN8ZX EXCISION OF SIGMOID COLON, VIA NATURAL OR ARTIFICIAL OPENING ENDOSCOPIC, DIAGNOSTIC: ICD-10-PCS | Performed by: INTERNAL MEDICINE

## 2024-05-17 PROCEDURE — 2500000003 HC RX 250 WO HCPCS: Performed by: NURSE ANESTHETIST, CERTIFIED REGISTERED

## 2024-05-17 PROCEDURE — 6360000002 HC RX W HCPCS: Performed by: INTERNAL MEDICINE

## 2024-05-17 PROCEDURE — 6360000002 HC RX W HCPCS: Performed by: NURSE ANESTHETIST, CERTIFIED REGISTERED

## 2024-05-17 PROCEDURE — 1200000000 HC SEMI PRIVATE

## 2024-05-17 PROCEDURE — 7100000000 HC PACU RECOVERY - FIRST 15 MIN: Performed by: INTERNAL MEDICINE

## 2024-05-17 PROCEDURE — 2580000003 HC RX 258: Performed by: NURSE ANESTHETIST, CERTIFIED REGISTERED

## 2024-05-17 PROCEDURE — 85027 COMPLETE CBC AUTOMATED: CPT

## 2024-05-17 PROCEDURE — 36430 TRANSFUSION BLD/BLD COMPNT: CPT

## 2024-05-17 PROCEDURE — 3609012400 HC EGD TRANSORAL BIOPSY SINGLE/MULTIPLE: Performed by: INTERNAL MEDICINE

## 2024-05-17 PROCEDURE — 3700000001 HC ADD 15 MINUTES (ANESTHESIA): Performed by: INTERNAL MEDICINE

## 2024-05-17 PROCEDURE — 80048 BASIC METABOLIC PNL TOTAL CA: CPT

## 2024-05-17 PROCEDURE — 2580000003 HC RX 258: Performed by: INTERNAL MEDICINE

## 2024-05-17 PROCEDURE — 7100000001 HC PACU RECOVERY - ADDTL 15 MIN: Performed by: INTERNAL MEDICINE

## 2024-05-17 PROCEDURE — 0DB68ZX EXCISION OF STOMACH, VIA NATURAL OR ARTIFICIAL OPENING ENDOSCOPIC, DIAGNOSTIC: ICD-10-PCS | Performed by: INTERNAL MEDICINE

## 2024-05-17 PROCEDURE — 3609010300 HC COLONOSCOPY W/BIOPSY SINGLE/MULTIPLE: Performed by: INTERNAL MEDICINE

## 2024-05-17 PROCEDURE — 36415 COLL VENOUS BLD VENIPUNCTURE: CPT

## 2024-05-17 PROCEDURE — 0DBN8ZZ EXCISION OF SIGMOID COLON, VIA NATURAL OR ARTIFICIAL OPENING ENDOSCOPIC: ICD-10-PCS | Performed by: INTERNAL MEDICINE

## 2024-05-17 PROCEDURE — 88305 TISSUE EXAM BY PATHOLOGIST: CPT

## 2024-05-17 PROCEDURE — 2709999900 HC NON-CHARGEABLE SUPPLY: Performed by: INTERNAL MEDICINE

## 2024-05-17 PROCEDURE — 3700000000 HC ANESTHESIA ATTENDED CARE: Performed by: INTERNAL MEDICINE

## 2024-05-17 PROCEDURE — 0DBM8ZX EXCISION OF DESCENDING COLON, VIA NATURAL OR ARTIFICIAL OPENING ENDOSCOPIC, DIAGNOSTIC: ICD-10-PCS | Performed by: INTERNAL MEDICINE

## 2024-05-17 PROCEDURE — 88342 IMHCHEM/IMCYTCHM 1ST ANTB: CPT

## 2024-05-17 PROCEDURE — 6370000000 HC RX 637 (ALT 250 FOR IP): Performed by: INTERNAL MEDICINE

## 2024-05-17 PROCEDURE — 0DBC8ZX EXCISION OF ILEOCECAL VALVE, VIA NATURAL OR ARTIFICIAL OPENING ENDOSCOPIC, DIAGNOSTIC: ICD-10-PCS | Performed by: INTERNAL MEDICINE

## 2024-05-17 RX ORDER — LIDOCAINE HYDROCHLORIDE 20 MG/ML
INJECTION, SOLUTION INFILTRATION; PERINEURAL PRN
Status: DISCONTINUED | OUTPATIENT
Start: 2024-05-17 | End: 2024-05-17 | Stop reason: SDUPTHER

## 2024-05-17 RX ORDER — SODIUM CHLORIDE 9 MG/ML
INJECTION, SOLUTION INTRAVENOUS CONTINUOUS PRN
Status: DISCONTINUED | OUTPATIENT
Start: 2024-05-17 | End: 2024-05-17 | Stop reason: SDUPTHER

## 2024-05-17 RX ORDER — SODIUM CHLORIDE 9 MG/ML
INJECTION, SOLUTION INTRAVENOUS PRN
Status: DISCONTINUED | OUTPATIENT
Start: 2024-05-17 | End: 2024-05-17

## 2024-05-17 RX ORDER — PROPOFOL 10 MG/ML
INJECTION, EMULSION INTRAVENOUS PRN
Status: DISCONTINUED | OUTPATIENT
Start: 2024-05-17 | End: 2024-05-17 | Stop reason: SDUPTHER

## 2024-05-17 RX ADMIN — SODIUM CHLORIDE: 9 INJECTION, SOLUTION INTRAVENOUS at 10:19

## 2024-05-17 RX ADMIN — SODIUM CHLORIDE 200 MG: 9 INJECTION, SOLUTION INTRAVENOUS at 18:04

## 2024-05-17 RX ADMIN — METFORMIN HYDROCHLORIDE 500 MG: 500 TABLET ORAL at 11:52

## 2024-05-17 RX ADMIN — PROPOFOL 40 MG: 10 INJECTION, EMULSION INTRAVENOUS at 10:44

## 2024-05-17 RX ADMIN — PHENYLEPHRINE HYDROCHLORIDE 50 MCG: 10 INJECTION INTRAVENOUS at 10:50

## 2024-05-17 RX ADMIN — AMLODIPINE BESYLATE 5 MG: 5 TABLET ORAL at 11:52

## 2024-05-17 RX ADMIN — INSULIN GLARGINE 8 UNITS: 100 INJECTION, SOLUTION SUBCUTANEOUS at 20:00

## 2024-05-17 RX ADMIN — PROPOFOL 20 MG: 10 INJECTION, EMULSION INTRAVENOUS at 10:30

## 2024-05-17 RX ADMIN — PROPOFOL 40 MG: 10 INJECTION, EMULSION INTRAVENOUS at 10:35

## 2024-05-17 RX ADMIN — LISINOPRIL 10 MG: 10 TABLET ORAL at 11:52

## 2024-05-17 RX ADMIN — PROPOFOL 50 MG: 10 INJECTION, EMULSION INTRAVENOUS at 10:54

## 2024-05-17 RX ADMIN — PROPOFOL 40 MG: 10 INJECTION, EMULSION INTRAVENOUS at 10:26

## 2024-05-17 RX ADMIN — LIDOCAINE HYDROCHLORIDE 60 MG: 20 INJECTION, SOLUTION INFILTRATION; PERINEURAL at 10:26

## 2024-05-17 RX ADMIN — PROPOFOL 10 MG: 10 INJECTION, EMULSION INTRAVENOUS at 10:48

## 2024-05-17 RX ADMIN — PHENYLEPHRINE HYDROCHLORIDE 50 MCG: 10 INJECTION INTRAVENOUS at 10:36

## 2024-05-17 RX ADMIN — PROPOFOL 20 MG: 10 INJECTION, EMULSION INTRAVENOUS at 10:32

## 2024-05-17 RX ADMIN — PROPOFOL 40 MG: 10 INJECTION, EMULSION INTRAVENOUS at 10:28

## 2024-05-17 RX ADMIN — PROPOFOL 40 MG: 10 INJECTION, EMULSION INTRAVENOUS at 10:39

## 2024-05-17 RX ADMIN — METFORMIN HYDROCHLORIDE 500 MG: 500 TABLET ORAL at 15:11

## 2024-05-17 RX ADMIN — PHENYLEPHRINE HYDROCHLORIDE 50 MCG: 10 INJECTION INTRAVENOUS at 10:41

## 2024-05-17 ASSESSMENT — PAIN SCALES - GENERAL
PAINLEVEL_OUTOF10: 0
PAINLEVEL_OUTOF10: 0

## 2024-05-17 ASSESSMENT — LIFESTYLE VARIABLES: SMOKING_STATUS: 0

## 2024-05-17 ASSESSMENT — PAIN - FUNCTIONAL ASSESSMENT
PAIN_FUNCTIONAL_ASSESSMENT: NONE - DENIES PAIN
PAIN_FUNCTIONAL_ASSESSMENT: 0-10
PAIN_FUNCTIONAL_ASSESSMENT: WONG-BAKER FACES

## 2024-05-17 NOTE — H&P
[place] [address]                           HISTORY & PHYSICAL      PATIENT NAME: ADELINE HART               : 1940  MED REC NO: [id number]                      ROOM: [facility]  ACCOUNT NO: [id number]                       ADMIT DATE: 2024  PROVIDER: Jadon Juarez MD      REFERRING PHYSICIAN:  JADON JUAREZ    HISTORY OF PRESENT ILLNESS:  The patient is an 83-year-old white American lady, my private practice patient, was directly admitted to [facility] following a routine lab work when the lab notified me about a low hemoglobin of 6.8.  I instructed her  to directly bring her for direct admission.  As such, she does not have any obvious hematemesis or melena.  No hematochezia.  She does have significant amount of weight loss over the last 6 months.  She has a poorly controlled type 1 kind of diabetes.  In the past has been even admitted for renal insufficiency.    PAST MEDICAL HISTORY:  Pertinent for resection of a tubulovillous adenoma, hypertension, chronic protein-calorie malnutrition, type 1 diabetes mellitus.    PAST SURGICAL HISTORY:  Pertinent for multiple colonoscopies in  and had a laparoscopic transverse colectomy and cholecystectomy.    FAMILY HISTORY:  Both the parents are  because of natural causes.  Her father, who was my patient who had 's disease.    SOCIAL HISTORY:  She is a  woman, they have 1 son.  If she did any smoking, that was at least 40-50 years ago.  No history of alcohol usage.  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  No history of substance abuse.    REVIEW OF SYSTEMS:  Negative for loss of consciousness.  No convulsions.  Does have poor appetite.  No hematemesis.  No melena.  No angina pectoris.  Does have exertional shortness of breath, fatigue.  No orthopnea or paroxysmal nocturnal dyspnea.  No intermittent

## 2024-05-17 NOTE — H&P
Gastroenterology Note             Pre-operative History and Physical    Patient: Herman Mccarthy  : 1940  CSN:     History Obtained From:  patient and/or guardian.     HISTORY OF PRESENT ILLNESS:    The patient is a 83 y.o. female  here for EGD/colonoscopy.   This a very pleasant 83-year-old female came in with a significant iron deficiency anemia redoing an upper and lower endoscopy today    Past Medical History:    Past Medical History:   Diagnosis Date    Diabetes mellitus (HCC)     Hypertension      Past Surgical History:    Past Surgical History:   Procedure Laterality Date    CHOLECYSTECTOMY      COLECTOMY N/A 2021    LAPAROSCOPIC ASSISTED TRANSVERSE COLON RESECTION performed by Mayank Maradiaga MD at [facility] OR    COLONOSCOPY N/A 2021    COLONOSCOPY WITH BIOPSY performed by Ruel Caraballo MD at [facility] ENDOSCOPY    COLONOSCOPY  2021    COLONOSCOPY SUBMUCOSAL/BOTOX INJECTION performed by Ruel Caraballo MD at [facility] ENDOSCOPY    COLONOSCOPY  2021    COLONOSCOPY POLYPECTOMY SNARE/COLD BIOPSY performed by Ruel Caraballo MD at [facility] ENDOSCOPY     Medications Prior to Admission:   No current facility-administered medications on file prior to encounter.     Current Outpatient Medications on File Prior to Encounter   Medication Sig Dispense Refill    Insulin Aspart (NOVOLOG FLEXPEN SC) Inject into the skin      lisinopril (PRINIVIL;ZESTRIL) 10 MG tablet Take 1 tablet by mouth daily 90 tablet 0    OneTouch Delica Lancets 33G MISC TEST BLOOD SUGAR 3 TIMES A DAY DX: E10.65 300 each 5    ONETOUCH VERIO strip TEST BLOOD SUGAR 3 TIMES A  strip 5    insulin glargine (BASAGLAR KWIKPEN) 100 UNIT/ML injection pen 6 units daily. (Patient taking differently: 8 units daily.) 15 mL 3    BD PEN NEEDLE MIHAELA 2ND GEN 32G X 4 MM MISC Use to inject insulin 4 times daily. 400 each 3    nystatin-triamcinolone (MYCOLOG II) 490520-8.1 UNIT/GM-% cream Apply topically 2 times daily.  (Patient not taking: Reported on 5/16/2024) 45 g 1    metFORMIN HCl (RIOMET) 500 MG/5ML SOLN Take 5 ml TID 1350 mL 3        Allergies:  Asa [aspirin], Codeine, Meloxicam, and Penicillins      Social History:   Social History     Tobacco Use    Smoking status: Former     Current packs/day: 0.50     Average packs/day: 0.5 packs/day for 10.0 years (5.0 ttl pk-yrs)     Types: Cigarettes    Smokeless tobacco: Never   Substance Use Topics    Alcohol use: No     Family History:   Family History   Problem Relation Age of Onset    Other Brother         hypoglycemia        PHYSICAL EXAM:      BP (!) 179/57   Pulse 78   Temp 97.7 °F (36.5 °C) (Temporal)   Resp 18   SpO2 98%  I        Heart:   RRR, normal s1s2    Lungs:  CTA bilat,  Normal effort    Abdomen:   NT, ND      ASA Grade:  ASA 3 - Patient with moderate systemic disease with functional limitations    Mallampati Class: 2          ASSESSMENT AND PLAN:    1.  Patient is a 83 y.o. female here for EGD/Colonoscopy with MAC.   2.  Procedure options, risks and benefits reviewed with patient.  Patient expresses understanding.    Corby Banda MD,   [facility]  5/17/2024

## 2024-05-17 NOTE — PROCEDURES
Colonoscopy Procedure  Note          Patient: Herman Mccarthy  : 1940  CRN:  @CRN@    Procedure: Colonoscopy with biopsy, polypectomy (cold biopsy)    Date:  2024    Surgeon:  Corby Banda MD, MD    Referring Physician:  Enoch Varner MD    Preoperative Diagnosis:  Anemia, unspecified type [D64.9]    Postoperative Diagnosis: #1 possible stricture of the terminal ileum or ileocecal valve  #2 inflammation throughout the sigmoid descending transverse a  #3 extensive diverticulosis of the sigmoid colon  #4 a 4 mm sigmoid colon polyp  #5 internal hemorrhoid    Anesthesia:  MAC    EBL: Minimal to none.    Indications: This is a 83 y.o. year old female who came to the hospital yesterday because she was found to have a severe iron deficiency anemia    Procedure:   An informed consent was obtained from the patient after explanation of indications, benefits, possible risks and complications of the procedure.  The patient was then taken to the endoscopy suite, placed in the left lateral decubitus position, and the above IV anesthesia was administered.      Digital rectal examination was performed.  No masses good rectal tone      Rectum normal on 4 view she did have internal hemorrhoid    Sigmoid extensive diverticulosis we did find a 4 mm polyp here that removed also the patient had this very interesting inflammation so we did    Descending scattered diverticula and the same type of inflammation that was in the sigmoid we    Transverse same type of inflammation that was in the descending and sigmoid we    Ascending normal    Cecum normal    TI ileocecal valve seem to be all scarred down and inflamed we did take a biopsy but we could not get him we could see stool coming from what appeared to be of the terminal ileum    Preparation was good    The patient tolerated the procedure well and was taken to the PACU in good condition.  There were no immediate complications.      Impression: #1 extensive

## 2024-05-17 NOTE — CARE COORDINATION
Discharge Planning Note:  Chart reviewed for discharge needs  and it appears that patient has minimal needs for discharge at this time.   Risk Score 9 %     Primary Care Physician is Enoch Varner MD    Primary insurance is [employer]    Please notify case management if any discharge needs are identified.      Case management will continue to follow progress and update discharge plan as needed.

## 2024-05-17 NOTE — PROGRESS NOTES
Hospital Problems             Last Modified POA    * (Principal) Anemia 5/16/2024 Yes    Microcytic hypochromic anemia 5/16/2024 Yes    Cachexia (HCC) 5/16/2024 Yes    Type 1 diabetes mellitus with hyperglycemia (HCC) 5/16/2024 Yes    Hypertensive urgency 5/16/2024 Yes   H&P dictated

## 2024-05-17 NOTE — PROCEDURES
Endoscopy Note    Patient: Herman Mccarthy  : 1940  CSN: [id number]    Procedure: Esophagogastroduodenoscopy with biopsy    Date:  2024     Surgeon:  Corby Banda MD     Referring Physician:  Enoch Varner MD    Preoperative Diagnosis:  Anemia, unspecified type [D64.9]    Postoperative Diagnosis: #1 hiatal hernia #2 gastritis    Anesthesia:  MAC    EBL: minimal to none.    Indications: This is a 83 y.o. year old female who is an inpatient at [facility] she came in with a severe iron deficiency anemia redoing an upper and lower endoscopy.    Description of Procedure:  Informed consent was obtained from the patient after explanation of indications, benefits and possible risks and complications of the procedure.  The patient was then taken to the endoscopy suite, placed in the left lateral decubitus position and the above IV sedation was administrered.    The Olympus video endoscope was passed through the hypopharynx     Posterior pharynx was normal  Esophagus was normal  Hiatal hernia is good 2 cm  Stomach did have some inflammation but there is no ulcers ulcerations or active bleeding we did take a biopsy for H. Pylori  Duodenum was normal  Retroflexion showed the hiatal hernia    Gastric or Duodenal ulcer present: No    The patient tolerated the procedure well and was taken to the post anesthesia care unit in good condition.  There were no immediate complications.      Impression: #1 hiatal hernia #2      Recommendations: Will need to follow-up on the bile  Patient should be on a proton pump inhibitor  We will move onto the colonoscopy    Corby Banda MD, MD  [facility]  [phone]    Please note that some or all of this record was generated using voice recognition software. If there are any questions about the content of this document, please contact the author as some errors in translation may have occurred. PROCEDURE NOTE  Date: 2024   Name: Herman Mccarthy  Date of Birth:

## 2024-05-17 NOTE — ANESTHESIA POSTPROCEDURE EVALUATION
Department of Anesthesiology  Postprocedure Note    Patient: Herman Mccarthy  MRN: [id number]  YOB: 1940  Date of evaluation: 5/17/2024    Procedure Summary       Date: 05/17/24 Room / Location: [place] [address] / [facility]    Anesthesia Start: 1022 Anesthesia Stop: 1102    Procedures:       ESOPHAGOGASTRODUODENOSCOPY BIOPSY (Abdomen)      COLONOSCOPY BIOPSY Diagnosis:       Anemia, unspecified type      (Anemia, unspecified type [D64.9])    Surgeons: Corby Banda MD Responsible Provider: Jeanne Yo MD    Anesthesia Type: MAC ASA Status: 3            Anesthesia Type: MAC    Evert Phase I: Evert Score: 10    Evert Phase II:      Anesthesia Post Evaluation    Patient location during evaluation: PACU  Level of consciousness: awake  Airway patency: patent  Cardiovascular status: hemodynamically stable  Respiratory status: acceptable  There was medical reason for not using a multimodal analgesia pain management approach.    No notable events documented.

## 2024-05-17 NOTE — ANESTHESIA PRE PROCEDURE
Department of Anesthesiology  Preprocedure Note       Name:  Herman Mccarthy   Age:  83 y.o.  :  1940                                          MRN:  [id number]         Date:  2024      Surgeon: Surgeon(s):  Corby Banda MD    Procedure: Procedure(s):  ESOPHAGOGASTRODUODENOSCOPY DIAGNOSTIC ONLY  COLONOSCOPY DIAGNOSTIC    Medications prior to admission:   Prior to Admission medications    Medication Sig Start Date End Date Taking? Authorizing Provider   Insulin Aspart (NOVOLOG FLEXPEN SC) Inject into the skin    Provider, MD Lisette   lisinopril (PRINIVIL;ZESTRIL) 10 MG tablet Take 1 tablet by mouth daily 24  Enoch Varner MD OneTouch Delica Lancets 33G MISC TEST BLOOD SUGAR 3 TIMES A DAY DX: E10.65 24   Sangeetha Villegas MD ONETOUCH VERIO strip TEST BLOOD SUGAR 3 TIMES A DAY 3/29/24   Sangeetha Villegas MD   insulin glargine (BASAGLAR KWIKPEN) 100 UNIT/ML injection pen 6 units daily.  Patient taking differently: 8 units daily. 3/7/24   Sangeetha Villegas MD   BD PEN NEEDLE MIHAELA 2ND GEN 32G X 4 MM MISC Use to inject insulin 4 times daily. 23   Sangeetha Villegas MD   nystatin-triamcinolone (MYCOLOG II) 514716-3.1 UNIT/GM-% cream Apply topically 2 times daily.  Patient not taking: Reported on 2024   Enoch Varner MD   metFORMIN HCl (RIOMET) 500 MG/5ML SOLN Take 5 ml TID 23   Sangeetha Villegas MD       Current medications:    Current Facility-Administered Medications   Medication Dose Route Frequency Provider Last Rate Last Admin   • insulin glargine (LANTUS) injection vial 8 Units  8 Units SubCUTAneous Nightly Enoch Varner MD       • lisinopril (PRINIVIL;ZESTRIL) tablet 10 mg  10 mg Oral Daily Enoch Varner MD   10 mg at 24   • metFORMIN (GLUCOPHAGE) tablet 500 mg  500 mg Oral TID Enoch Varner MD   500 mg at 24   • 0.9 % sodium chloride infusion   IntraVENous PRN Enoch Varner MD       • dextrose bolus 10% 125 mL  125 mL IntraVENous

## 2024-05-18 VITALS
HEART RATE: 64 BPM | DIASTOLIC BLOOD PRESSURE: 72 MMHG | OXYGEN SATURATION: 100 % | RESPIRATION RATE: 18 BRPM | BODY MASS INDEX: 16.83 KG/M2 | WEIGHT: 104.72 LBS | TEMPERATURE: 97.8 F | HEIGHT: 66 IN | SYSTOLIC BLOOD PRESSURE: 123 MMHG

## 2024-05-18 LAB
GLUCOSE BLD-MCNC: 173 MG/DL (ref 70–99)
GLUCOSE BLD-MCNC: 271 MG/DL (ref 70–99)
HCT VFR BLD AUTO: 24.2 % (ref 36–48)
HGB BLD-MCNC: 7.8 G/DL (ref 12–16)
PERFORMED ON: ABNORMAL
PERFORMED ON: ABNORMAL

## 2024-05-18 PROCEDURE — 85014 HEMATOCRIT: CPT

## 2024-05-18 PROCEDURE — 6370000000 HC RX 637 (ALT 250 FOR IP): Performed by: INTERNAL MEDICINE

## 2024-05-18 PROCEDURE — 85018 HEMOGLOBIN: CPT

## 2024-05-18 PROCEDURE — 36415 COLL VENOUS BLD VENIPUNCTURE: CPT

## 2024-05-18 RX ORDER — LISINOPRIL 10 MG/1
20 TABLET ORAL DAILY
Qty: 30 TABLET | Refills: 0 | Status: SHIPPED | OUTPATIENT
Start: 2024-05-18 | End: 2024-06-17

## 2024-05-18 RX ORDER — FERROUS SULFATE 325(65) MG
325 TABLET ORAL 2 TIMES DAILY
Qty: 180 TABLET | Refills: 1 | Status: SHIPPED | OUTPATIENT
Start: 2024-05-18

## 2024-05-18 RX ORDER — INSULIN GLARGINE 100 [IU]/ML
8 INJECTION, SOLUTION SUBCUTANEOUS NIGHTLY
Qty: 10 ML | Refills: 3 | Status: SHIPPED | OUTPATIENT
Start: 2024-05-18

## 2024-05-18 RX ORDER — INSULIN LISPRO 100 [IU]/ML
0-8 INJECTION, SOLUTION INTRAVENOUS; SUBCUTANEOUS
Qty: 1 EACH | Refills: 0 | Status: SHIPPED | OUTPATIENT
Start: 2024-05-18

## 2024-05-18 RX ORDER — AMLODIPINE BESYLATE 5 MG/1
5 TABLET ORAL DAILY
Qty: 30 TABLET | Refills: 0 | Status: SHIPPED | OUTPATIENT
Start: 2024-05-19

## 2024-05-18 RX ADMIN — AMLODIPINE BESYLATE 5 MG: 5 TABLET ORAL at 07:50

## 2024-05-18 RX ADMIN — LISINOPRIL 10 MG: 10 TABLET ORAL at 07:50

## 2024-05-18 RX ADMIN — INSULIN LISPRO 4 UNITS: 100 INJECTION, SOLUTION INTRAVENOUS; SUBCUTANEOUS at 12:05

## 2024-05-18 ASSESSMENT — PAIN SCALES - WONG BAKER
WONGBAKER_NUMERICALRESPONSE: NO HURT

## 2024-05-18 ASSESSMENT — PAIN SCALES - GENERAL: PAINLEVEL_OUTOF10: 0

## 2024-05-18 NOTE — CARE COORDINATION
Discharge order noted.     Per MD note: pt can go home after CT abdomen done and resulted.    No needs, no therapy was ordered.     Love Lacy RN, BSN  [phone]

## 2024-05-18 NOTE — PLAN OF CARE
Problem: Chronic Conditions and Co-morbidities  Goal: Patient's chronic conditions and co-morbidity symptoms are monitored and maintained or improved  Outcome: Progressing     Problem: Safety - Adult  Goal: Free from fall injury  Outcome: Progressing     Problem: ABCDS Injury Assessment  Goal: Absence of physical injury  Outcome: Progressing     Problem: Pain  Goal: Verbalizes/displays adequate comfort level or baseline comfort level  Outcome: Progressing

## 2024-05-18 NOTE — PROGRESS NOTES
Lymphocytes % 34.5 %    Monocytes % 17.9 %    Eosinophils % 0.9 %    Basophils % 0.4 %    Neutrophils Absolute 1.8 1.7 - 7.7 K/uL    Lymphocytes Absolute 1.3 1.0 - 5.1 K/uL    Monocytes Absolute 0.7 0.0 - 1.3 K/uL    Eosinophils Absolute 0.0 0.0 - 0.6 K/uL    Basophils Absolute 0.0 0.0 - 0.2 K/uL    Anisocytosis 2+ (A)     Hypochromia 1+ (A)     Schistocytes Occasional (A)     Marisel Cells Occasional (A)     Ovalocytes 1+ (A)     Tear Drop Cells Occasional (A)    Basic Metabolic Panel    Collection Time: 05/15/24 10:51 AM   Result Value Ref Range    Sodium 142 136 - 145 mmol/L    Potassium 4.3 3.5 - 5.1 mmol/L    Chloride 103 99 - 110 mmol/L    CO2 26 21 - 32 mmol/L    Anion Gap 13 3 - 16    Glucose 100 (H) 70 - 99 mg/dL    BUN 20 7 - 20 mg/dL    Creatinine 1.0 0.6 - 1.2 mg/dL    Est, Glom Filt Rate 55 (A) >60    Calcium 9.5 8.3 - 10.6 mg/dL   Hemoglobin A1C    Collection Time: 05/15/24 10:51 AM   Result Value Ref Range    Hemoglobin A1C 6.4 See comment %    Estimated Avg Glucose 137.0 mg/dL   Blood Smear Review    Collection Time: 05/15/24 10:51 AM   Result Value Ref Range    Path Consult Reviewed    TYPE AND SCREEN    Collection Time: 05/16/24  4:26 PM   Result Value Ref Range    ABO/Rh O POS     Antibody Screen NEG    PREPARE RBC (CROSSMATCH), 1 Units    Collection Time: 05/16/24  4:26 PM   Result Value Ref Range    Product Code Blood Bank [id number]     Description Blood Bank Red Blood Cells, Leuko-reduced     Unit Number [id number]     Dispense Status Blood Bank transfused    Iron and TIBC    Collection Time: 05/16/24  4:26 PM   Result Value Ref Range    Iron 17 (L) 37 - 145 ug/dL    TIBC 418 260 - 445 ug/dL    Iron % Saturation 4 (L) 15 - 50 %   Ferritin    Collection Time: 05/16/24  4:26 PM   Result Value Ref Range    Ferritin 10.9 (L) 15.0 - 150.0 ng/mL   PREPARE RBC (CROSSMATCH), 1 Units    Collection Time: 05/16/24  4:26 PM   Result Value Ref Range    Product Code Blood Bank [id number]     Description Blood     Collection Time: 05/18/24  5:37 AM   Result Value Ref Range    Hemoglobin 7.8 (L) 12.0 - 16.0 g/dL    Hematocrit 24.2 (L) 36.0 - 48.0 %       ASSESSMENT AND PLAN     Hospital Problems             Last Modified POA    * (Principal) Anemia 5/16/2024 Yes    Microcytic hypochromic anemia 5/16/2024 Yes    Cachexia (HCC) 5/16/2024 Yes    Type 1 diabetes mellitus with hyperglycemia (HCC) 5/16/2024 Yes    Hypertensive urgency 5/16/2024 Yes     S/p EGD Colon endoscopy   polyp removal  biopsy pending terminal tleum stricture CT abdomen pelvis ordered     This Patient's cross coverage is provided by hospitalist services between 7.00 PM to 7.00 am, please contact hospitalist service on call for any emergent issue.

## 2024-05-18 NOTE — CONSENT
Informed Consent for Blood Component Transfusion Note    I have discussed with the patient the rationale for blood component transfusion; its benefits in treating or preventing fatigue, organ damage, or death; and its risk which includes mild transfusion reactions, rare risk of blood borne infection, or more serious but rare reactions. I have discussed the alternatives to transfusion, including the risk and consequences of not receiving transfusion. The patient had an opportunity to ask questions and had agreed to proceed with transfusion of blood components.    Electronically signed by Enoch Varner MD on 5/18/24 at 7:34 AM EDT

## 2024-05-18 NOTE — PROGRESS NOTES
Patient can go home after CT abdomen done and resulted , outpatient follow up with GI may need Capsule endoscopy as out patient

## 2024-05-18 NOTE — PROGRESS NOTES
Gastroenterology Progress Note      Herman Mccarthy is a 83 y.o. female patient.  Principal Problem:    Anemia  Active Problems:    Microcytic hypochromic anemia    Cachexia (HCC)    Type 1 diabetes mellitus with hyperglycemia (HCC)    Hypertensive urgency  Resolved Problems:    * No resolved hospital problems. *      SUBJECTIVE: This is a patient who was admitted because she was severely anemic with iron deficiency    She underwent upper and lower endoscopy yesterday  Her colonoscopy revealed some type of possible strictured area  Her upper endoscopy showed a hiatal hernia with no Butch's erosions    We are awaiting a CT scan    ROS:  No fever, chills  No chest pain, palpitations  No SOB, cough  Gastrointestinal ROS: no abdominal pain, nausea, vomiting     Physical    VITALS:  /62   Pulse 74   Temp 97.8 °F (36.6 °C) (Oral)   Resp 16   Ht 1.664 m (5' 5.51\")   Wt 47.5 kg (104 lb 11.5 oz)   SpO2 98%   BMI 17.15 kg/m²   TEMPERATURE:  Current - Temp: 97.8 °F (36.6 °C); Max - Temp  Av.7 °F (36.5 °C)  Min: 97.4 °F (36.3 °C)  Max: 97.9 °F (36.6 °C)    NAD  RRR, Nl s1s2  Lungs CTA Bilaterally, normal effort  Abdomen soft, ND, NT, no HSM, Bowel sounds normal.    Data    Data Review:    Recent Labs     05/15/24  1051 24  0556 24  0537   WBC 3.8* 3.6*  --    HGB 6.5* 7.5* 7.8*   HCT 21.6* 23.1* 24.2*   MCV 66.9* 68.2*  --     165  --      Recent Labs     05/15/24  1051 24  0557    141   K 4.3 3.9    104   CO2 26 23   BUN 20 24*   CREATININE 1.0 0.9     No results for input(s): \"AST\", \"ALT\", \"BILIDIR\", \"BILITOT\", \"ALKPHOS\" in the last 72 hours.    Invalid input(s): \"ALB\"  No results for input(s): \"LIPASE\", \"AMYLASE\" in the last 72 hours.  No results for input(s): \"PROTIME\", \"INR\" in the last 72 hours.  Invalid input(s): \"PTT\"    Radiology Review:        ASSESSMENT severe anemia  -At this point in time we do not have a good GI explanation  -The patient showed some of

## 2024-05-18 NOTE — DISCHARGE INSTR - COC
diagnosis listed and that she requires  home for greater 30 days.     Update Admission H&P: No change in H&P    PHYSICIAN SIGNATURE:  Electronically signed by Enoch Varner MD on 5/18/24 at 2:26 PM EDT

## 2024-05-19 DIAGNOSIS — D50.0 IRON DEFICIENCY ANEMIA DUE TO CHRONIC BLOOD LOSS: Primary | ICD-10-CM

## 2024-05-19 DIAGNOSIS — R63.4 WEIGHT LOSS: ICD-10-CM

## 2024-05-20 ENCOUNTER — CARE COORDINATION (OUTPATIENT)
Dept: CASE MANAGEMENT | Age: 84
End: 2024-05-20

## 2024-05-20 DIAGNOSIS — I16.0 HYPERTENSIVE URGENCY: Primary | ICD-10-CM

## 2024-05-20 LAB
BLOOD BANK DISPENSE STATUS: NORMAL
BLOOD BANK PRODUCT CODE: NORMAL
BPU ID: NORMAL
DESCRIPTION BLOOD BANK: NORMAL

## 2024-05-20 PROCEDURE — 1111F DSCHRG MED/CURRENT MED MERGE: CPT | Performed by: INTERNAL MEDICINE

## 2024-05-20 NOTE — CARE COORDINATION
Care Transitions Initial Follow Up Call    Call within 2 business days of discharge: Yes    Patient Current Location:  Home: [address] [place] [id number]    Care Transition Nurse contacted the patient by telephone to perform post hospital discharge assessment. Verified name and  with patient as identifiers. Provided introduction to self, and explanation of the Care Transition Nurse role.     Patient: Herman Mccarthy Patient : 1940   MRN: [id number]  Reason for Admission:   Discharge Date: 24 RARS: Readmission Risk Score: 9.8      Last Discharge Facility       Date Complaint Diagnosis Description Type Department Provider    24  Anemia, unspecified type Admission (Discharged) [facility] 5T Enoch Varner MD            Was this an external facility discharge? No Discharge Facility:     Challenges to be reviewed by the provider   Additional needs identified to be addressed with provider: No  none               Method of communication with provider: none.    Pt states doing well, no issues or concerns. BP was 150/61 today. Encouraged pt to make f/u appt with PCP, voiced understanding..Agreed to more CTC f/u calls.      Care Transition Nurse reviewed discharge instructions with patient who verbalized understanding. The patient was given an opportunity to ask questions and does not have any further questions or concerns at this time. Were discharge instructions available to patient? Yes. Reviewed appropriate site of care based on symptoms and resources available to patient including: When to call 911. The patient agrees to contact the PCP office for questions related to their healthcare.     Advance Care Planning:   Does patient have an Advance Directive: not on file.    Medication reconciliation was performed with patient, who verbalizes understanding of administration of home medications. Medications reviewed, 1111F entered: yes  Was patient discharged with a pulse oximeter? no    Non-face-to-face

## 2024-05-22 ENCOUNTER — CARE COORDINATION (OUTPATIENT)
Dept: CASE MANAGEMENT | Age: 84
End: 2024-05-22

## 2024-05-22 NOTE — CARE COORDINATION
Care Transitions     Per Epic, pt has yet to make a f/u appt with her PCP, this nurse will follow up at a later date.      Patient: Herman Mccarthy Patient : 1940 MRN: [id number]    Last Discharge Facility       Date Complaint Diagnosis Description Type Department Provider    24  Anemia, unspecified type Admission (Discharged) [facility]  Enoch Varner MD                Noted following upcoming appointments from discharge chart review:   BS follow up appointment(s):   Future Appointments   Date Time Provider Department Center   2024 10:00 AM Enoch Varner MD [facility]   10/9/2024 10:20 AM Sangeetha Villegas MD [facility]     Non-BS  follow up appointment(s):

## 2024-05-24 ENCOUNTER — CARE COORDINATION (OUTPATIENT)
Dept: CASE MANAGEMENT | Age: 84
End: 2024-05-24

## 2024-05-24 NOTE — CARE COORDINATION
Care Transitions Outreach Attempt    Attempted to reach patient for transitions of care follow up. Unable to reach patient.    Patient: Herman Mccarthy Patient : 1940 MRN: [id number]    Last Discharge Facility       Date Complaint Diagnosis Description Type Department Provider    24  Anemia, unspecified type Admission (Discharged) [facility]T Enoch Varner MD                Noted following upcoming appointments from discharge chart review:   BS follow up appointment(s):   Future Appointments   Date Time Provider Department Center   2024 10:00 AM [facility]   10/9/2024 10:20 AM Sangeetha Villegas MD [facility]     Non-BS  follow up appointment(s):

## 2024-05-29 ENCOUNTER — CARE COORDINATION (OUTPATIENT)
Dept: CASE MANAGEMENT | Age: 84
End: 2024-05-29

## 2024-05-29 NOTE — CARE COORDINATION
Care Transitions Follow Up Call    Patient Current Location:  Home: [address] [place] [id number]    Care Transition Nurse contacted the patient by telephone to follow up after admission.  Verified name and  with patient as identifiers.    Patient: Herman Mccarthy  Patient : 1940   MRN: [id number]  Reason for Admission:   Discharge Date: 24 RARS: Readmission Risk Score: 9.8      Needs to be reviewed by the provider   Additional needs identified to be addressed with provider: No  none             Method of communication with provider: none.    Pt states doing well, no issues or concerns.Reminded pt schedule a hospital f/u appt with her PCP.  Agreed to more CTC f/u calls.      Addressed changes since last contact:  none  Discussed follow-up appointments. If no appointment was previously scheduled, appointment scheduling offered: Encouraged pt to make f/u appt with PCP, voiced understanding.  .   Is follow up appointment scheduled within 7 days of discharge? No.    Follow Up  Future Appointments   Date Time Provider Department Center   2024 10:00 AM Enoch Varner MD [facility]   10/9/2024 10:20 AM Sangeetha Villegas MD [facility]     External follow up appointment(s):     Care Transition Nurse reviewed medical action plan with patient and discussed any barriers to care and/or understanding of plan of care after discharge. Discussed appropriate site of care based on symptoms and resources available to patient including: When to call 911. The patient agrees to contact the PCP office for questions related to their healthcare.     Advance Care Planning:   not on file; education provided.     Offered patient enrollment in the Remote Patient Monitoring (RPM) program for in-home monitoring: Patient is not eligible for RPM program because: affiliate provider.     Care Transitions Subsequent and Final Call    Subsequent and Final Calls  Do you have any ongoing symptoms?: No  Have your

## 2024-06-05 ENCOUNTER — CARE COORDINATION (OUTPATIENT)
Dept: CASE MANAGEMENT | Age: 84
End: 2024-06-05

## 2024-06-05 NOTE — CARE COORDINATION
Care Transitions Note    Follow Up Call      Patient Current Location:  Home: [address] [place] [id number]    Care Transition Nurse contacted the patient by telephone. Verified name and  as identifiers.    Additional needs identified to be addressed with provider   No needs identified                 Method of communication with provider: none.    Care Summary Note: Pt states doing well, no issues or concerns. BPs and BSs doing well. Agreed to more CTC f/u calls.      Plan of care updates since last contact:  Review of patient management of conditions/medications: DM, BP       Advance Care Planning:   Does patient have an Advance Directive: reviewed during previous call, see note. .    Medication Review:  No changes since last call.     Remote Patient Monitoring:  Offered patient enrollment in the Remote Patient Monitoring (RPM) program for in-home monitoring: Patient is not eligible for RPM program because: affiliate provider.    Assessments:  Care Transitions Subsequent and Final Call    Subsequent and Final Calls  Do you have any ongoing symptoms?: No  Have your medications changed?: No  Do you have any questions related to your medications?: No  Do you currently have any active services?: No  Do you have any needs or concerns that I can assist you with?: No  Identified Barriers: None  Care Transitions Interventions  No Identified Needs  Other Interventions:              Follow Up Appointment:   Reviewed upcoming appointment(s).  Future Appointments         Provider Specialty Dept Phone    2024 10:00 AM Enoch Varner MD Internal Medicine [phone]    10/9/2024 10:20 AM Sangeetha Villegas MD Endocrinology [phone]            Care Transition Nurse provided contact information.  Plan for follow-up call in 6-10 days based on severity of symptoms and risk factors.  Plan for next call: self management-       Mookie Jones RN

## 2024-06-07 DIAGNOSIS — E10.65 UNCONTROLLED TYPE 1 DIABETES MELLITUS WITH HYPERGLYCEMIA (HCC): ICD-10-CM

## 2024-06-07 RX ORDER — METFORMIN HYDROCHLORIDE 500 MG/5ML
SOLUTION ORAL
Qty: 1350 ML | Refills: 0 | Status: SHIPPED | OUTPATIENT
Start: 2024-06-07

## 2024-06-12 ENCOUNTER — TELEPHONE (OUTPATIENT)
Dept: ENDOCRINOLOGY | Age: 84
End: 2024-06-12

## 2024-06-12 ENCOUNTER — CARE COORDINATION (OUTPATIENT)
Dept: CASE MANAGEMENT | Age: 84
End: 2024-06-12

## 2024-06-12 NOTE — CARE COORDINATION
Care Transitions Note  Follow Up Call     Patient Current Location:  Home: [address] [place] [id number]    Care Transition Nurse contacted the patient by telephone. Verified name and  as identifiers.    Additional needs identified to be addressed with provider   No needs identified                 Method of communication with provider: none.    Care Summary Note: Pt states doing well, no issues or concerns. Per endocrinologist insulin was decreased 1 unit at lunch and dinner based off the BS log he reviewed. Agreed to more CTC f/u calls.        Advance Care Planning:   Does patient have an Advance Directive: reviewed during previous call, see note. .    Medication Review:  Medications changed since last call, reviewed today.     Remote Patient Monitoring:  Offered patient enrollment in the Remote Patient Monitoring (RPM) program for in-home monitoring: Patient is not eligible for RPM program because: affiliate provider.    Assessments:  Care Transitions Subsequent and Final Call    Subsequent and Final Calls  Do you have any ongoing symptoms?: No  Have your medications changed?: No  Do you have any questions related to your medications?: No  Do you currently have any active services?: No  Do you have any needs or concerns that I can assist you with?: No  Identified Barriers: None  Care Transitions Interventions  No Identified Needs  Other Interventions:              Follow Up Appointment:   Reviewed upcoming appointment(s).  Future Appointments         Provider Specialty Dept Phone    2024 10:00 AM Enoch Varner MD Internal Medicine [phone]    10/9/2024 10:20 AM Sangeetha Villegas MD Endocrinology [phone]            Care Transition Nurse provided contact information.  Plan for follow-up call in 6-10 days based on severity of symptoms and risk factors.  Plan for next call: self management-       Mookie Jones RN

## 2024-06-12 NOTE — TELEPHONE ENCOUNTER
Please advise patient that I reviewed her self monitored glucose logs and I would recommend reducing short acting insulin with lunch and dinner by 1 unit

## 2024-06-18 ENCOUNTER — APPOINTMENT (OUTPATIENT)
Dept: CT IMAGING | Age: 84
End: 2024-06-18
Payer: MEDICARE

## 2024-06-18 ENCOUNTER — HOSPITAL ENCOUNTER (INPATIENT)
Age: 84
LOS: 2 days | Discharge: HOME | End: 2024-06-20
Attending: INTERNAL MEDICINE | Admitting: HOSPITALIST
Payer: MEDICARE

## 2024-06-18 ENCOUNTER — APPOINTMENT (OUTPATIENT)
Dept: MRI IMAGING | Age: 84
End: 2024-06-18
Attending: INTERNAL MEDICINE
Payer: MEDICARE

## 2024-06-18 ENCOUNTER — APPOINTMENT (OUTPATIENT)
Dept: GENERAL RADIOLOGY | Age: 84
End: 2024-06-18
Payer: MEDICARE

## 2024-06-18 ENCOUNTER — HOSPITAL ENCOUNTER (EMERGENCY)
Age: 84
Discharge: ANOTHER HOSPITAL | End: 2024-06-18
Attending: EMERGENCY MEDICINE
Payer: MEDICARE

## 2024-06-18 VITALS
HEIGHT: 65 IN | WEIGHT: 107 LBS | OXYGEN SATURATION: 97 % | RESPIRATION RATE: 23 BRPM | DIASTOLIC BLOOD PRESSURE: 56 MMHG | HEART RATE: 77 BPM | SYSTOLIC BLOOD PRESSURE: 128 MMHG | BODY MASS INDEX: 17.83 KG/M2 | TEMPERATURE: 98 F

## 2024-06-18 DIAGNOSIS — S06.6XAA TRAUMATIC SUBARACHNOID HEMORRHAGE WITH UNKNOWN LOSS OF CONSCIOUSNESS STATUS, INITIAL ENCOUNTER (HCC): Primary | ICD-10-CM

## 2024-06-18 DIAGNOSIS — R29.6 UNWITNESSED FALL: ICD-10-CM

## 2024-06-18 DIAGNOSIS — R55 SYNCOPE, UNSPECIFIED SYNCOPE TYPE: Primary | ICD-10-CM

## 2024-06-18 DIAGNOSIS — I10 UNCONTROLLED HYPERTENSION: ICD-10-CM

## 2024-06-18 DIAGNOSIS — S61.213A LACERATION OF LEFT MIDDLE FINGER WITHOUT FOREIGN BODY WITHOUT DAMAGE TO NAIL, INITIAL ENCOUNTER: ICD-10-CM

## 2024-06-18 DIAGNOSIS — S51.011A SKIN TEAR OF RIGHT ELBOW WITHOUT COMPLICATION, INITIAL ENCOUNTER: ICD-10-CM

## 2024-06-18 DIAGNOSIS — S01.81XA FACIAL LACERATION, INITIAL ENCOUNTER: ICD-10-CM

## 2024-06-18 DIAGNOSIS — N30.00 ACUTE CYSTITIS WITHOUT HEMATURIA: ICD-10-CM

## 2024-06-18 PROBLEM — I60.9 SUBARACHNOID HEMORRHAGE (HCC): Status: ACTIVE | Noted: 2024-06-18

## 2024-06-18 LAB
ABO + RH BLD: NORMAL
ALBUMIN SERPL-MCNC: 4.2 G/DL (ref 3.4–5)
ALBUMIN/GLOB SERPL: 1.7 {RATIO} (ref 1.1–2.2)
ALP SERPL-CCNC: 72 U/L (ref 40–129)
ALT SERPL-CCNC: 11 U/L (ref 10–40)
ANION GAP SERPL CALCULATED.3IONS-SCNC: 9 MMOL/L (ref 3–16)
AST SERPL-CCNC: 18 U/L (ref 15–37)
BACTERIA URNS QL MICRO: ABNORMAL /HPF
BASOPHILS # BLD: 0 K/UL (ref 0–0.2)
BASOPHILS # BLD: 0 K/UL (ref 0–0.2)
BASOPHILS NFR BLD: 0.2 %
BASOPHILS NFR BLD: 0.5 %
BILIRUB SERPL-MCNC: 0.6 MG/DL (ref 0–1)
BILIRUB UR QL STRIP.AUTO: NEGATIVE
BLD GP AB SCN SERPL QL: NORMAL
BUN SERPL-MCNC: 14 MG/DL (ref 7–20)
CALCIUM SERPL-MCNC: 9.2 MG/DL (ref 8.3–10.6)
CHLORIDE SERPL-SCNC: 101 MMOL/L (ref 99–110)
CLARITY UR: CLEAR
CO2 SERPL-SCNC: 24 MMOL/L (ref 21–32)
COLOR UR: YELLOW
CREAT SERPL-MCNC: 0.7 MG/DL (ref 0.6–1.2)
DEPRECATED RDW RBC AUTO: 28.6 % (ref 12.4–15.4)
DEPRECATED RDW RBC AUTO: 28.7 % (ref 12.4–15.4)
EKG ATRIAL RATE: 71 BPM
EKG DIAGNOSIS: NORMAL
EKG P AXIS: 58 DEGREES
EKG P-R INTERVAL: 152 MS
EKG Q-T INTERVAL: 402 MS
EKG QRS DURATION: 74 MS
EKG QTC CALCULATION (BAZETT): 436 MS
EKG R AXIS: 6 DEGREES
EKG T AXIS: 69 DEGREES
EKG VENTRICULAR RATE: 71 BPM
EOSINOPHIL # BLD: 0 K/UL (ref 0–0.6)
EOSINOPHIL # BLD: 0 K/UL (ref 0–0.6)
EOSINOPHIL NFR BLD: 0.1 %
EOSINOPHIL NFR BLD: 1 %
EPI CELLS #/AREA URNS AUTO: 1 /HPF (ref 0–5)
FERRITIN SERPL IA-MCNC: 58 NG/ML (ref 15–150)
GFR SERPLBLD CREATININE-BSD FMLA CKD-EPI: 85 ML/MIN/{1.73_M2}
GLUCOSE BLD-MCNC: 180 MG/DL (ref 70–99)
GLUCOSE SERPL-MCNC: 204 MG/DL (ref 70–99)
GLUCOSE UR STRIP.AUTO-MCNC: NEGATIVE MG/DL
HCT VFR BLD AUTO: 30.5 % (ref 36–48)
HCT VFR BLD AUTO: 33.4 % (ref 36–48)
HGB BLD-MCNC: 10.7 G/DL (ref 12–16)
HGB BLD-MCNC: 9.9 G/DL (ref 12–16)
HGB UR QL STRIP.AUTO: ABNORMAL
HYALINE CASTS #/AREA URNS AUTO: 0 /LPF (ref 0–8)
IMMATURE RETIC FRACT: 0.43 (ref 0.21–0.37)
INR PPP: 1.01 (ref 0.85–1.15)
KETONES UR STRIP.AUTO-MCNC: NEGATIVE MG/DL
LEUKOCYTE ESTERASE UR QL STRIP.AUTO: ABNORMAL
LYMPHOCYTES # BLD: 1 K/UL (ref 1–5.1)
LYMPHOCYTES # BLD: 1.3 K/UL (ref 1–5.1)
LYMPHOCYTES NFR BLD: 14.3 %
LYMPHOCYTES NFR BLD: 28.2 %
MCH RBC QN AUTO: 26.2 PG (ref 26–34)
MCH RBC QN AUTO: 26.3 PG (ref 26–34)
MCHC RBC AUTO-ENTMCNC: 32 G/DL (ref 31–36)
MCHC RBC AUTO-ENTMCNC: 32.7 G/DL (ref 31–36)
MCV RBC AUTO: 80.6 FL (ref 80–100)
MCV RBC AUTO: 81.7 FL (ref 80–100)
MICROCYTES BLD QL SMEAR: ABNORMAL
MONOCYTES # BLD: 0.7 K/UL (ref 0–1.3)
MONOCYTES # BLD: 1 K/UL (ref 0–1.3)
MONOCYTES NFR BLD: 13.3 %
MONOCYTES NFR BLD: 14.1 %
NEUTROPHILS # BLD: 2.7 K/UL (ref 1.7–7.7)
NEUTROPHILS # BLD: 5.3 K/UL (ref 1.7–7.7)
NEUTROPHILS NFR BLD: 56.2 %
NEUTROPHILS NFR BLD: 72.1 %
NITRITE UR QL STRIP.AUTO: NEGATIVE
NT-PROBNP SERPL-MCNC: 751 PG/ML (ref 0–449)
PERFORMED ON: ABNORMAL
PH UR STRIP.AUTO: 6 [PH] (ref 5–8)
PLATELET # BLD AUTO: 118 K/UL (ref 135–450)
PLATELET # BLD AUTO: 135 K/UL (ref 135–450)
PLATELET BLD QL SMEAR: ABNORMAL
PMV BLD AUTO: 8.2 FL (ref 5–10.5)
PMV BLD AUTO: 8.3 FL (ref 5–10.5)
POTASSIUM SERPL-SCNC: 4.3 MMOL/L (ref 3.5–5.1)
PROT SERPL-MCNC: 6.7 G/DL (ref 6.4–8.2)
PROT UR STRIP.AUTO-MCNC: 100 MG/DL
PROTHROMBIN TIME: 13.5 SEC (ref 11.9–14.9)
RBC # BLD AUTO: 3.78 M/UL (ref 4–5.2)
RBC # BLD AUTO: 4.09 M/UL (ref 4–5.2)
RBC CLUMPS #/AREA URNS AUTO: 9 /HPF (ref 0–4)
RETICS # AUTO: 0.05 M/UL (ref 0.02–0.1)
RETICS/RBC NFR AUTO: 1.33 % (ref 0.5–2.18)
SLIDE REVIEW: ABNORMAL
SODIUM SERPL-SCNC: 134 MMOL/L (ref 136–145)
SP GR UR STRIP.AUTO: 1.01 (ref 1–1.03)
TROPONIN, HIGH SENSITIVITY: 24 NG/L (ref 0–14)
TROPONIN, HIGH SENSITIVITY: 27 NG/L (ref 0–14)
UA COMPLETE W REFLEX CULTURE PNL UR: YES
UA DIPSTICK W REFLEX MICRO PNL UR: YES
URN SPEC COLLECT METH UR: ABNORMAL
UROBILINOGEN UR STRIP-ACNC: 1 E.U./DL
WBC # BLD AUTO: 4.8 K/UL (ref 4–11)
WBC # BLD AUTO: 7.3 K/UL (ref 4–11)
WBC #/AREA URNS AUTO: 79 /HPF (ref 0–5)

## 2024-06-18 PROCEDURE — 87086 URINE CULTURE/COLONY COUNT: CPT

## 2024-06-18 PROCEDURE — 82607 VITAMIN B-12: CPT

## 2024-06-18 PROCEDURE — 73130 X-RAY EXAM OF HAND: CPT

## 2024-06-18 PROCEDURE — 36415 COLL VENOUS BLD VENIPUNCTURE: CPT

## 2024-06-18 PROCEDURE — 90714 TD VACC NO PRESV 7 YRS+ IM: CPT | Performed by: EMERGENCY MEDICINE

## 2024-06-18 PROCEDURE — 81001 URINALYSIS AUTO W/SCOPE: CPT

## 2024-06-18 PROCEDURE — 12004 RPR S/N/AX/GEN/TRK7.6-12.5CM: CPT

## 2024-06-18 PROCEDURE — 84484 ASSAY OF TROPONIN QUANT: CPT

## 2024-06-18 PROCEDURE — 1200000000 HC SEMI PRIVATE

## 2024-06-18 PROCEDURE — 71045 X-RAY EXAM CHEST 1 VIEW: CPT

## 2024-06-18 PROCEDURE — 82746 ASSAY OF FOLIC ACID SERUM: CPT

## 2024-06-18 PROCEDURE — 86901 BLOOD TYPING SEROLOGIC RH(D): CPT

## 2024-06-18 PROCEDURE — 6360000004 HC RX CONTRAST MEDICATION: Performed by: EMERGENCY MEDICINE

## 2024-06-18 PROCEDURE — 86900 BLOOD TYPING SEROLOGIC ABO: CPT

## 2024-06-18 PROCEDURE — 2580000003 HC RX 258: Performed by: EMERGENCY MEDICINE

## 2024-06-18 PROCEDURE — 85045 AUTOMATED RETICULOCYTE COUNT: CPT

## 2024-06-18 PROCEDURE — 73070 X-RAY EXAM OF ELBOW: CPT

## 2024-06-18 PROCEDURE — 85025 COMPLETE CBC W/AUTO DIFF WBC: CPT

## 2024-06-18 PROCEDURE — 83540 ASSAY OF IRON: CPT

## 2024-06-18 PROCEDURE — 2580000003 HC RX 258: Performed by: HOSPITALIST

## 2024-06-18 PROCEDURE — 96374 THER/PROPH/DIAG INJ IV PUSH: CPT

## 2024-06-18 PROCEDURE — 6360000002 HC RX W HCPCS: Performed by: HOSPITALIST

## 2024-06-18 PROCEDURE — 93010 ELECTROCARDIOGRAM REPORT: CPT | Performed by: INTERNAL MEDICINE

## 2024-06-18 PROCEDURE — 74177 CT ABD & PELVIS W/CONTRAST: CPT

## 2024-06-18 PROCEDURE — 83516 IMMUNOASSAY NONANTIBODY: CPT

## 2024-06-18 PROCEDURE — 51702 INSERT TEMP BLADDER CATH: CPT

## 2024-06-18 PROCEDURE — 83880 ASSAY OF NATRIURETIC PEPTIDE: CPT

## 2024-06-18 PROCEDURE — 85610 PROTHROMBIN TIME: CPT

## 2024-06-18 PROCEDURE — 6360000004 HC RX CONTRAST MEDICATION: Performed by: HOSPITALIST

## 2024-06-18 PROCEDURE — 93005 ELECTROCARDIOGRAM TRACING: CPT | Performed by: EMERGENCY MEDICINE

## 2024-06-18 PROCEDURE — 80053 COMPREHEN METABOLIC PANEL: CPT

## 2024-06-18 PROCEDURE — 82784 ASSAY IGA/IGD/IGG/IGM EACH: CPT

## 2024-06-18 PROCEDURE — 90471 IMMUNIZATION ADMIN: CPT | Performed by: EMERGENCY MEDICINE

## 2024-06-18 PROCEDURE — A9579 GAD-BASE MR CONTRAST NOS,1ML: HCPCS | Performed by: HOSPITALIST

## 2024-06-18 PROCEDURE — 2500000003 HC RX 250 WO HCPCS: Performed by: EMERGENCY MEDICINE

## 2024-06-18 PROCEDURE — 6370000000 HC RX 637 (ALT 250 FOR IP): Performed by: HOSPITALIST

## 2024-06-18 PROCEDURE — 70450 CT HEAD/BRAIN W/O DYE: CPT

## 2024-06-18 PROCEDURE — 82728 ASSAY OF FERRITIN: CPT

## 2024-06-18 PROCEDURE — 70553 MRI BRAIN STEM W/O & W/DYE: CPT

## 2024-06-18 PROCEDURE — 96375 TX/PRO/DX INJ NEW DRUG ADDON: CPT

## 2024-06-18 PROCEDURE — 83550 IRON BINDING TEST: CPT

## 2024-06-18 PROCEDURE — 99285 EMERGENCY DEPT VISIT HI MDM: CPT

## 2024-06-18 PROCEDURE — 72125 CT NECK SPINE W/O DYE: CPT

## 2024-06-18 PROCEDURE — 96365 THER/PROPH/DIAG IV INF INIT: CPT

## 2024-06-18 PROCEDURE — 6360000002 HC RX W HCPCS: Performed by: EMERGENCY MEDICINE

## 2024-06-18 PROCEDURE — 86850 RBC ANTIBODY SCREEN: CPT

## 2024-06-18 RX ORDER — INSULIN LISPRO 100 [IU]/ML
0-4 INJECTION, SOLUTION INTRAVENOUS; SUBCUTANEOUS
Status: DISCONTINUED | OUTPATIENT
Start: 2024-06-18 | End: 2024-06-20

## 2024-06-18 RX ORDER — LIDOCAINE HYDROCHLORIDE AND EPINEPHRINE 10; 10 MG/ML; UG/ML
20 INJECTION, SOLUTION INFILTRATION; PERINEURAL ONCE
Status: COMPLETED | OUTPATIENT
Start: 2024-06-18 | End: 2024-06-18

## 2024-06-18 RX ORDER — AMLODIPINE BESYLATE 5 MG/1
5 TABLET ORAL DAILY
Status: DISCONTINUED | OUTPATIENT
Start: 2024-06-18 | End: 2024-06-19

## 2024-06-18 RX ORDER — LEVETIRACETAM 500 MG/5ML
1000 INJECTION, SOLUTION, CONCENTRATE INTRAVENOUS ONCE
Status: COMPLETED | OUTPATIENT
Start: 2024-06-18 | End: 2024-06-18

## 2024-06-18 RX ORDER — GLUCAGON 1 MG/ML
1 KIT INJECTION PRN
Status: DISCONTINUED | OUTPATIENT
Start: 2024-06-18 | End: 2024-06-20 | Stop reason: HOSPADM

## 2024-06-18 RX ORDER — ACETAMINOPHEN 325 MG/1
650 TABLET ORAL EVERY 4 HOURS PRN
Status: DISCONTINUED | OUTPATIENT
Start: 2024-06-18 | End: 2024-06-20 | Stop reason: HOSPADM

## 2024-06-18 RX ORDER — SODIUM CHLORIDE 0.9 % (FLUSH) 0.9 %
5-40 SYRINGE (ML) INJECTION EVERY 12 HOURS SCHEDULED
Status: DISCONTINUED | OUTPATIENT
Start: 2024-06-18 | End: 2024-06-20 | Stop reason: HOSPADM

## 2024-06-18 RX ORDER — LABETALOL HYDROCHLORIDE 5 MG/ML
10 INJECTION, SOLUTION INTRAVENOUS EVERY 10 MIN PRN
Status: DISCONTINUED | OUTPATIENT
Start: 2024-06-18 | End: 2024-06-20 | Stop reason: HOSPADM

## 2024-06-18 RX ORDER — LORAZEPAM 2 MG/ML
1 INJECTION INTRAMUSCULAR EVERY 4 HOURS PRN
Status: DISCONTINUED | OUTPATIENT
Start: 2024-06-18 | End: 2024-06-20 | Stop reason: HOSPADM

## 2024-06-18 RX ORDER — INSULIN GLARGINE 100 [IU]/ML
8 INJECTION, SOLUTION SUBCUTANEOUS NIGHTLY
Status: DISCONTINUED | OUTPATIENT
Start: 2024-06-18 | End: 2024-06-20 | Stop reason: HOSPADM

## 2024-06-18 RX ORDER — DEXTROSE MONOHYDRATE 100 MG/ML
INJECTION, SOLUTION INTRAVENOUS CONTINUOUS PRN
Status: DISCONTINUED | OUTPATIENT
Start: 2024-06-18 | End: 2024-06-20 | Stop reason: HOSPADM

## 2024-06-18 RX ORDER — ONDANSETRON 2 MG/ML
4 INJECTION INTRAMUSCULAR; INTRAVENOUS EVERY 6 HOURS PRN
Status: DISCONTINUED | OUTPATIENT
Start: 2024-06-18 | End: 2024-06-20 | Stop reason: HOSPADM

## 2024-06-18 RX ORDER — ONDANSETRON 4 MG/1
4 TABLET, ORALLY DISINTEGRATING ORAL EVERY 8 HOURS PRN
Status: DISCONTINUED | OUTPATIENT
Start: 2024-06-18 | End: 2024-06-20 | Stop reason: HOSPADM

## 2024-06-18 RX ORDER — LEVETIRACETAM 500 MG/5ML
500 INJECTION, SOLUTION, CONCENTRATE INTRAVENOUS EVERY 12 HOURS
Status: DISCONTINUED | OUTPATIENT
Start: 2024-06-19 | End: 2024-06-19

## 2024-06-18 RX ORDER — INSULIN LISPRO 100 [IU]/ML
0-4 INJECTION, SOLUTION INTRAVENOUS; SUBCUTANEOUS NIGHTLY
Status: DISCONTINUED | OUTPATIENT
Start: 2024-06-18 | End: 2024-06-20

## 2024-06-18 RX ORDER — SODIUM CHLORIDE 9 MG/ML
INJECTION, SOLUTION INTRAVENOUS PRN
Status: DISCONTINUED | OUTPATIENT
Start: 2024-06-18 | End: 2024-06-20 | Stop reason: HOSPADM

## 2024-06-18 RX ORDER — SODIUM CHLORIDE 0.9 % (FLUSH) 0.9 %
5-40 SYRINGE (ML) INJECTION PRN
Status: DISCONTINUED | OUTPATIENT
Start: 2024-06-18 | End: 2024-06-20 | Stop reason: HOSPADM

## 2024-06-18 RX ADMIN — GADOTERIDOL 9 ML: 279.3 INJECTION, SOLUTION INTRAVENOUS at 19:34

## 2024-06-18 RX ADMIN — IOPAMIDOL 75 ML: 755 INJECTION, SOLUTION INTRAVENOUS at 09:04

## 2024-06-18 RX ADMIN — CLOSTRIDIUM TETANI TOXOID ANTIGEN (FORMALDEHYDE INACTIVATED) AND CORYNEBACTERIUM DIPHTHERIAE TOXOID ANTIGEN (FORMALDEHYDE INACTIVATED) 0.5 ML: 5; 2 INJECTION, SUSPENSION INTRAMUSCULAR at 08:56

## 2024-06-18 RX ADMIN — WATER 1000 MG: 1 INJECTION INTRAMUSCULAR; INTRAVENOUS; SUBCUTANEOUS at 10:40

## 2024-06-18 RX ADMIN — SODIUM CHLORIDE, PRESERVATIVE FREE 10 ML: 5 INJECTION INTRAVENOUS at 21:14

## 2024-06-18 RX ADMIN — ACETAMINOPHEN 650 MG: 325 TABLET ORAL at 21:15

## 2024-06-18 RX ADMIN — LEVETIRACETAM 1000 MG: 100 INJECTION INTRAVENOUS at 10:53

## 2024-06-18 RX ADMIN — LABETALOL HYDROCHLORIDE 10 MG: 5 INJECTION, SOLUTION INTRAVENOUS at 22:12

## 2024-06-18 RX ADMIN — SODIUM CHLORIDE 5 MG/HR: 9 INJECTION, SOLUTION INTRAVENOUS at 10:53

## 2024-06-18 RX ADMIN — INSULIN GLARGINE 8 UNITS: 100 INJECTION, SOLUTION SUBCUTANEOUS at 21:13

## 2024-06-18 RX ADMIN — LIDOCAINE HYDROCHLORIDE,EPINEPHRINE BITARTRATE 20 ML: 10; .01 INJECTION, SOLUTION INFILTRATION; PERINEURAL at 08:14

## 2024-06-18 ASSESSMENT — PAIN DESCRIPTION - ORIENTATION
ORIENTATION: RIGHT;LEFT
ORIENTATION: RIGHT;LEFT

## 2024-06-18 ASSESSMENT — PAIN DESCRIPTION - DESCRIPTORS
DESCRIPTORS: ACHING;DISCOMFORT
DESCRIPTORS: DISCOMFORT
DESCRIPTORS: ACHING;DISCOMFORT

## 2024-06-18 ASSESSMENT — PAIN - FUNCTIONAL ASSESSMENT
PAIN_FUNCTIONAL_ASSESSMENT: 0-10
PAIN_FUNCTIONAL_ASSESSMENT: PREVENTS OR INTERFERES SOME ACTIVE ACTIVITIES AND ADLS
PAIN_FUNCTIONAL_ASSESSMENT: PREVENTS OR INTERFERES SOME ACTIVE ACTIVITIES AND ADLS

## 2024-06-18 ASSESSMENT — PAIN SCALES - GENERAL
PAINLEVEL_OUTOF10: 0
PAINLEVEL_OUTOF10: 0
PAINLEVEL_OUTOF10: 7
PAINLEVEL_OUTOF10: 7
PAINLEVEL_OUTOF10: 8

## 2024-06-18 ASSESSMENT — PAIN DESCRIPTION - LOCATION
LOCATION: ARM;HEAD
LOCATION: BACK;ARM
LOCATION: HEAD;ARM

## 2024-06-18 ASSESSMENT — PAIN DESCRIPTION - PAIN TYPE: TYPE: ACUTE PAIN

## 2024-06-18 ASSESSMENT — PAIN DESCRIPTION - ONSET: ONSET: ON-GOING

## 2024-06-18 ASSESSMENT — PAIN DESCRIPTION - FREQUENCY: FREQUENCY: INTERMITTENT

## 2024-06-18 NOTE — ED NOTES
Pt calm and resting quietly with equal rise and fall of chest. Pt in NAD, RR even and unlabored.  Side rails up, bed locked and in lowest position. Pt updated on plan of care. Awaiting clean room at [facility] ICU. No needs at this time. Pt instructed on use of call light if needed.

## 2024-06-18 NOTE — ED NOTES
Cleaned dried blood from legs, hands, face. Pt has laceration to L middle finger and R elbow as well.

## 2024-06-18 NOTE — ED NOTES
MLP at bedside repairing lac. Aware of BP. Drip stopped at this time. Pt reports no more pain to pelvic region

## 2024-06-18 NOTE — PROGRESS NOTES
4 Eyes Skin Assessment     NAME:  Herman Mccarthy  YOB: 1940  MEDICAL RECORD NUMBER:  [id number]    The patient is being assessed for  Admission    I agree that at least one RN has performed a thorough Head to Toe Skin Assessment on the patient. ALL assessment sites listed below have been assessed.      Areas assessed by both nurses:    Head, Face, Ears, Shoulders, Back, Chest, Arms, Elbows, Hands, Sacrum. Buttock, Coccyx, Ischium, and Legs. Feet and Heels. Sutures to right forehead, sutures to fingers on left hand.Bruising noted to tongue as well as scattered bruising all over body.         Does the Patient have a Wound? {Action Wound:32376}       Saúl Prevention initiated by RN: Yes  Wound Care Orders initiated by RN: No    Pressure Injury (Stage 3,4, Unstageable, DTI, NWPT, and Complex wounds) if present, place Wound referral order by RN under : No    New Ostomies, if present place, Ostomy referral order under : No     Nurse 1 eSignature: {Esignature:[id number]}    **SHARE this note so that the co-signing nurse can place an eSignature**    Nurse 2 eSignature: {Esignature:[id number]}

## 2024-06-18 NOTE — PLAN OF CARE
[facility] Hospitalist Transfer accept note  Transfer center PS received  Case reviewed with ER physician  Reason for Transfer:  Herman Mccarthy 83 y.o. female  Chief Complaint   Patient presents with    Fall      states he woke up and she was out of bed facedown on the floor, was awake and talking normal. Has laceration to head that is bleeding.    No prolonged down time   did not feel she lost consciousness but overall unclear if syncopal fall or mechanical  Right facial laceration which was repaired in ER    - work up with CT with [facility]  - NS was consulted recommended transfer to [facility] ICU  - BP was elevated to systolic 220s, on Cardene gtt    Per chart review she is not on AP or AC  Recent admission for low Hgb where hgb 6.8  does have significant amount of weight loss over the last 6 months. EGD and colonoscopy was done, noted Haital hernia and severe diverticulosis but not acute bleeding noted  She has a poorly controlled type 1 kind of diabetes        Patient has been accepted for transfer to [facility].   Once patient arrive please page ON CALL HOSPITALIST so patient can be seen.   If unable to reach physician on [employer] please call hospitalist phone (#[phone])     PCP: Enoch Varner MD     Thanks  Chely Garay MD  Hospitalist

## 2024-06-18 NOTE — PROGRESS NOTES
Patient was brought over from the ICU. Vitals and assessment are as noted. Patient's spouse and son are as bedside and are assisting with admission information. Patient did pass swallow screen and diet was ordered. Notified FANS that patient has a diet order. Fall precautions are in place. Ponce is to gravity. Several areas of ecchymosis and lacerations. Call light and frequently used items are within pt's reach.

## 2024-06-18 NOTE — ED NOTES
Family concerned that dressing not adequate d/t bleeding coming through. RN redressed with surgicel and coban as pressure dressing. Updated on plan of care.

## 2024-06-18 NOTE — H&P
CT OF THE CERVICAL SPINE WITHOUT CONTRAST 6/18/2024 8:46 am TECHNIQUE: CT of the head was performed without the administration of intravenous contrast. Automated exposure control, iterative reconstruction, and/or weight based adjustment of the mA/kV was utilized to reduce the radiation dose to as low as reasonably achievable.; CT of the cervical spine was performed without the administration of intravenous contrast. Multiplanar reformatted images are provided for review. Automated exposure control, iterative reconstruction, and/or weight based adjustment of the mA/kV was utilized to reduce the radiation dose to as low as reasonably achievable. COMPARISON: None. HISTORY: ORDERING SYSTEM PROVIDED HISTORY: trauma FINDINGS: BRAIN/VENTRICLES: Small focus of right frontal subarachnoid hemorrhage. There is no intracranial mass effect or midline shift.  The gray-white differentiation is maintained without evidence of an acute infarct.  There is no evidence of hydrocephalus.  Mild generalized parenchymal volume loss. Remote lacunar infarct involving the right body of the caudate nucleus. ORBITS: The visualized portion of the orbits demonstrate no acute abnormality. SINUSES: The visualized paranasal sinuses and mastoid air cells demonstrate no acute abnormality. SOFT TISSUES/SKULL:  Right frontal scalp laceration/contusion.  No acute calvarial fracture. CERVICAL SPINE: BONES/ALIGNMENT: There is no acute fracture or traumatic malalignment. DEGENERATIVE CHANGES: No significant degenerative changes. SOFT TISSUES: The prevertebral soft tissues are unremarkable.     1. Small focus of right frontal subarachnoid hemorrhage. 2. Right frontal scalp laceration/contusion. No acute calvarial fracture. 3. No acute fracture or traumatic malalignment of the cervical spine.     CT CERVICAL SPINE WO CONTRAST    Result Date: 6/18/2024  EXAMINATION: CT OF THE HEAD WITHOUT CONTRAST; CT OF THE CERVICAL SPINE WITHOUT CONTRAST 6/18/2024 8:46 am

## 2024-06-18 NOTE — ED NOTES
Pt had small amount of stool and reports burning pelvic pain. RN took off purewick and tavarez placed per MD order

## 2024-06-18 NOTE — ED NOTES
[facility] given report, questions answered, care transferred. VSS for transport. Awaiting to give report to [facility]. Called back and still deciding where pt is going.

## 2024-06-18 NOTE — PROGRESS NOTES
4 Eyes Skin Assessment     NAME:  Herman Mccarthy  YOB: 1940  MEDICAL RECORD NUMBER:  [id number]    The patient is being assessed for  Admission    I agree that at least one RN has performed a thorough Head to Toe Skin Assessment on the patient. ALL assessment sites listed below have been assessed.      Areas assessed by both nurses:    Head, Face, Ears, Shoulders, Back, Chest, Arms, Elbows, Hands, Sacrum. Buttock, Coccyx, Ischium, Legs. Feet and Heels, and Under Medical Devices       Laceration on R forehead with stitches, right arm laceration, and small scab on left shin. Scattered bruising noted on face, arms and knees. Tongue is black and blue from possibly biting down.Otherwise skin is intact.     Does the Patient have a Wound? Yes wound(s) were present on assessment. LDA wound assessment was Initiated and completed by RN       Saúl Prevention initiated by RN: No  Wound Care Orders initiated by RN: No    Pressure Injury (Stage 3,4, Unstageable, DTI, NWPT, and Complex wounds) if present, place Wound referral order by RN under : No    New Ostomies, if present place, Ostomy referral order under : No     Nurse 1 eSignature: Electronically signed by Leonel Stewart RN on 6/18/24 at 5:53 PM EDT    **SHARE this note so that the co-signing nurse can place an eSignature**    Nurse 2 eSignature: Electronically signed by Valorie Newsome RN on 6/18/24 at 6:08 PM EDT

## 2024-06-18 NOTE — ED NOTES
Tried to give report to ICU whom sts that pt may not come to ICU and needs downgraded. RN notified charge nurse here and called transfer center whom sts tht pt to be in ICU.

## 2024-06-18 NOTE — ED PROVIDER NOTES
care time:  The total critical care time I independently spent while evaluating and treating this patient was 60 minutes.  This excludes time spent doing separately billable procedures.  This includes time at the bedside, data interpretation, medication management, obtaining critical history from collateral sources if the patient is unable to provide it directly, and physician consultation.  Specifics of interventions taken and potentially life-threatening diagnostic considerations are listed above in the medical decision making.  If this was a shared visit with an CINDI, the time in this attestation is non-concurrent critical care time out of the total shared critical care time provided by the CINDI and myself.    DISCLAIMER: This chart was created using Dragon dictation software.  Efforts were made by me to ensure accuracy, however some errors may be present due to limitations of this technology and occasionally words are not transcribed correctly.     Jeff Bryant MD  06/18/24 1303       Jeff Bryant MD  06/18/24 1043    
completion:  Tolerated  Lac Repair    Date/Time: 6/18/2024 12:00 PM    Performed by: Mc Yousif PA-C  Authorized by: Jeff Bryant MD    Consent:     Consent obtained:  Verbal    Consent given by:  Patient    Risks discussed:  Infection, retained foreign body, tendon damage, vascular damage, poor cosmetic result, poor wound healing, pain, need for additional repair and nerve damage  Universal protocol:     Patient identity confirmed:  Verbally with patient  Anesthesia:     Anesthesia method:  Nerve block    Block needle gauge:  27 G    Block anesthetic:  Lidocaine 1% w/o epi    Block technique:  Digital block    Block injection procedure:  Anatomic landmarks identified    Block outcome:  Anesthesia achieved  Laceration details:     Location:  Finger    Finger location:  L long finger    Length (cm):  3.8  Pre-procedure details:     Preparation:  Patient was prepped and draped in usual sterile fashion and imaging obtained to evaluate for foreign bodies  Exploration:     Wound exploration: wound explored through full range of motion and entire depth of wound visualized    Treatment:     Area cleansed with:  Saline    Amount of cleaning:  Standard    Irrigation solution:  Sterile saline  Skin repair:     Repair method:  Sutures    Suture size:  5-0    Suture material:  Nylon    Suture technique:  Simple interrupted    Number of sutures:  3  Approximation:     Approximation:  Close  Repair type:     Repair type:  Simple  Post-procedure details:     Dressing:  Non-adherent dressing and splint for protection    Procedure completion:  Tolerated    A AlumaFoam finger splint was applied by myself to the left third finger and onofre taped to the ring finger.  Splint placement was in good placement and patient was distally neurovascular intact after placement    CRITICAL CARE TIME (.cctime)       PAST MEDICAL HISTORY      has a past medical history of Diabetes mellitus (HCC) and Hypertension.     EMERGENCY

## 2024-06-18 NOTE — PROGRESS NOTES
Patient admitted to Icu room 4509. Plan to move to PCU 4318 once clean and ready. Patient has excessive dry blood all over head, face, and neck. Working on cleaning to see lacerations more clearly.   Patient oriented to name, , knows she fell but doesn't remember how or what happened, doesn't know place or situation or year.   Speech is clear but soft spoken. No aphasia to note.   Ponce cath in place. No BM to note.     81 HR  154/68 BP  96.8 Temp  18 RR  99% SPO2 on RA    Laceration on R forehead with stitches, right arm laceration, and small scab on left shin. Scattered bruising noted on face, arms and knees. Tongue is black and blue from possibly biting down.Otherwise skin is intact.     Hospitalist at bedside to assess.

## 2024-06-19 ENCOUNTER — APPOINTMENT (OUTPATIENT)
Dept: CT IMAGING | Age: 84
End: 2024-06-19
Attending: INTERNAL MEDICINE
Payer: MEDICARE

## 2024-06-19 PROBLEM — R56.9 SEIZURE WITH PROVOKING FACTOR (HCC): Status: ACTIVE | Noted: 2024-06-19

## 2024-06-19 LAB
ANION GAP SERPL CALCULATED.3IONS-SCNC: 12 MMOL/L (ref 3–16)
BACTERIA UR CULT: ABNORMAL
BUN SERPL-MCNC: 19 MG/DL (ref 7–20)
CALCIUM SERPL-MCNC: 8.4 MG/DL (ref 8.3–10.6)
CHLORIDE SERPL-SCNC: 100 MMOL/L (ref 99–110)
CHOLEST SERPL-MCNC: 127 MG/DL (ref 0–199)
CO2 SERPL-SCNC: 25 MMOL/L (ref 21–32)
CREAT SERPL-MCNC: 0.8 MG/DL (ref 0.6–1.2)
DEPRECATED RDW RBC AUTO: 27.6 % (ref 12.4–15.4)
GFR SERPLBLD CREATININE-BSD FMLA CKD-EPI: 72 ML/MIN/{1.73_M2}
GLUCOSE BLD-MCNC: 209 MG/DL (ref 70–99)
GLUCOSE BLD-MCNC: 252 MG/DL (ref 70–99)
GLUCOSE BLD-MCNC: 276 MG/DL (ref 70–99)
GLUCOSE BLD-MCNC: 362 MG/DL (ref 70–99)
GLUCOSE SERPL-MCNC: 208 MG/DL (ref 70–99)
HCT VFR BLD AUTO: 27.1 % (ref 36–48)
HDLC SERPL-MCNC: 54 MG/DL (ref 40–60)
HGB BLD-MCNC: 9.1 G/DL (ref 12–16)
IRON SATN MFR SERPL: 16 % (ref 15–50)
IRON SERPL-MCNC: 45 UG/DL (ref 37–145)
LDLC SERPL CALC-MCNC: 57 MG/DL
MCH RBC QN AUTO: 27.2 PG (ref 26–34)
MCHC RBC AUTO-ENTMCNC: 33.5 G/DL (ref 31–36)
MCV RBC AUTO: 81 FL (ref 80–100)
ORGANISM: ABNORMAL
PERFORMED ON: ABNORMAL
PLATELET # BLD AUTO: 128 K/UL (ref 135–450)
PMV BLD AUTO: 8.2 FL (ref 5–10.5)
POTASSIUM SERPL-SCNC: 3.9 MMOL/L (ref 3.5–5.1)
RBC # BLD AUTO: 3.35 M/UL (ref 4–5.2)
SODIUM SERPL-SCNC: 137 MMOL/L (ref 136–145)
TIBC SERPL-MCNC: 278 UG/DL (ref 260–445)
TRIGL SERPL-MCNC: 80 MG/DL (ref 0–150)
VLDLC SERPL CALC-MCNC: 16 MG/DL
WBC # BLD AUTO: 5.5 K/UL (ref 4–11)

## 2024-06-19 PROCEDURE — 99222 1ST HOSP IP/OBS MODERATE 55: CPT | Performed by: NURSE PRACTITIONER

## 2024-06-19 PROCEDURE — 6360000002 HC RX W HCPCS: Performed by: HOSPITALIST

## 2024-06-19 PROCEDURE — 70498 CT ANGIOGRAPHY NECK: CPT

## 2024-06-19 PROCEDURE — 95819 EEG AWAKE AND ASLEEP: CPT

## 2024-06-19 PROCEDURE — 85027 COMPLETE CBC AUTOMATED: CPT

## 2024-06-19 PROCEDURE — 80061 LIPID PANEL: CPT

## 2024-06-19 PROCEDURE — 99223 1ST HOSP IP/OBS HIGH 75: CPT | Performed by: INTERNAL MEDICINE

## 2024-06-19 PROCEDURE — 80048 BASIC METABOLIC PNL TOTAL CA: CPT

## 2024-06-19 PROCEDURE — 2580000003 HC RX 258: Performed by: HOSPITALIST

## 2024-06-19 PROCEDURE — 36415 COLL VENOUS BLD VENIPUNCTURE: CPT

## 2024-06-19 PROCEDURE — 92610 EVALUATE SWALLOWING FUNCTION: CPT

## 2024-06-19 PROCEDURE — 6370000000 HC RX 637 (ALT 250 FOR IP): Performed by: HOSPITALIST

## 2024-06-19 PROCEDURE — 1200000000 HC SEMI PRIVATE

## 2024-06-19 PROCEDURE — APPNB45 APP NON BILLABLE 31-45 MINUTES

## 2024-06-19 PROCEDURE — 6360000004 HC RX CONTRAST MEDICATION

## 2024-06-19 RX ORDER — LEVETIRACETAM 500 MG/1
500 TABLET ORAL 2 TIMES DAILY
Status: DISCONTINUED | OUTPATIENT
Start: 2024-06-19 | End: 2024-06-19

## 2024-06-19 RX ADMIN — LABETALOL HYDROCHLORIDE 10 MG: 5 INJECTION, SOLUTION INTRAVENOUS at 20:01

## 2024-06-19 RX ADMIN — SODIUM CHLORIDE 125 MG: 9 INJECTION, SOLUTION INTRAVENOUS at 10:48

## 2024-06-19 RX ADMIN — SODIUM CHLORIDE, PRESERVATIVE FREE 10 ML: 5 INJECTION INTRAVENOUS at 21:26

## 2024-06-19 RX ADMIN — SODIUM CHLORIDE, PRESERVATIVE FREE 10 ML: 5 INJECTION INTRAVENOUS at 08:01

## 2024-06-19 RX ADMIN — IOPAMIDOL 75 ML: 755 INJECTION, SOLUTION INTRAVENOUS at 17:09

## 2024-06-19 RX ADMIN — INSULIN LISPRO 4 UNITS: 100 INJECTION, SOLUTION INTRAVENOUS; SUBCUTANEOUS at 16:43

## 2024-06-19 RX ADMIN — INSULIN GLARGINE 8 UNITS: 100 INJECTION, SOLUTION SUBCUTANEOUS at 21:25

## 2024-06-19 RX ADMIN — INSULIN LISPRO 1 UNITS: 100 INJECTION, SOLUTION INTRAVENOUS; SUBCUTANEOUS at 08:02

## 2024-06-19 RX ADMIN — AMLODIPINE BESYLATE 5 MG: 5 TABLET ORAL at 08:01

## 2024-06-19 RX ADMIN — WATER 1000 MG: 1 INJECTION INTRAMUSCULAR; INTRAVENOUS; SUBCUTANEOUS at 11:54

## 2024-06-19 RX ADMIN — LEVETIRACETAM 500 MG: 500 TABLET, FILM COATED ORAL at 08:01

## 2024-06-19 RX ADMIN — LABETALOL HYDROCHLORIDE 10 MG: 5 INJECTION, SOLUTION INTRAVENOUS at 23:08

## 2024-06-19 RX ADMIN — INSULIN LISPRO 2 UNITS: 100 INJECTION, SOLUTION INTRAVENOUS; SUBCUTANEOUS at 11:54

## 2024-06-19 RX ADMIN — LABETALOL HYDROCHLORIDE 10 MG: 5 INJECTION, SOLUTION INTRAVENOUS at 17:38

## 2024-06-19 NOTE — PROGRESS NOTES
Pt off unit via stretcher with transport to complete testing (EEG).    Spoke to mihir with [facility]; she states per medicare guidelines they do not accept oxygen testing results that are typed on actual order; results of oxygen testing need to be in progress notes; please fax note to [facility] so they can process order

## 2024-06-19 NOTE — PLAN OF CARE
Problem: Discharge Planning  Goal: Discharge to home or other facility with appropriate resources  6/19/2024 0925 by Tomasa Souza, RN  Outcome: Progressing  Plan of care reviewed with pt and spouse. All questions answered at this time.      Problem: Skin/Tissue Integrity  Goal: Absence of new skin breakdown  6/19/2024 0925 by Tomasa Souza, RN  Outcome: Progressing  Turn and reposition q 2 hrs.

## 2024-06-19 NOTE — CONSULTS
[facility]   Cardiac Electrophysiology Consultation   Date: 6/19/2024  Admit Date:  6/18/2024  Reason for Consultation: ?  Syncope  Consult Requesting Physician: Quincy Barrera MD     No chief complaint on file.    HPI: eHrman Mccarthy is a 83 y.o. female with a past medical history significant for type 2 diabetes mellitus, hypertension, chronic indwelling catheter was admitted to the hospital secondary to fall and traumatic subarachnoid hemorrhage.    Apparently, she was in her usual state of health.  Her  heard a thumping sound and hence woke up and found her on the floor.  Patient did not lose consciousness.  She was little bit confused tongue bite noted on the physical examination by the primary team. She was also noted to have cystitis.  Hence, she is being treated for the same.  Her CT scan showed right frontal subarachnoid and subdural hemorrhage.     EKG shows normal sinus rhythm    Telemetry shows normal sinus rhythm    UA is consistent with cystitis    Echocardiogram pending    Impression:  Traumatic subarachnoid hemorrhage  Fall  Cystitis    Recommendations:  Based on history, there is no clear evidence of loss of consciousness  Keep the patient on telemetry  Check echocardiogram  Avoid vasodilators including amlodipine; if needed for blood pressure management, can use ACE inhibitors/ARB/non-dihydropyridine CCB  Check orthostatics  CAM monitor for 2 weeks at the time of discharge    Mona Andrews MD   Cardiac Electrophysiology  [facility]  Office [phone]    Past Medical History:   Diagnosis Date    Diabetes mellitus (HCC)     Hypertension         Past Surgical History:   Procedure Laterality Date    CHOLECYSTECTOMY      COLECTOMY N/A 8/25/2021    LAPAROSCOPIC ASSISTED TRANSVERSE COLON RESECTION performed by Mayank Maradiaga MD at [facility] OR    COLONOSCOPY N/A 6/21/2021    COLONOSCOPY WITH BIOPSY performed by Ruel Caraballo MD at [facility] ASC ENDOSCOPY

## 2024-06-19 NOTE — CONSULTS
NEUROSURGERY CONSULT NOTE    ADELINE MCCARTHY  [id number]   1940   6/19/2024    Requesting physician: Quincy Barrera MD    Reason for consultation: [facility], SD after fall    History of present illness: Adeline Mccarthy is a 83 y.o. female  who presents to the ED complaining of unwitnessed fall, although the  was woken up from sleep by the crashing sound of her falling so there is not suspicion for prolonged downtime.  The patient herself does not recall the mechanism of fall ,if she tripped and fell or if she got dizzy or lightheaded or had any other presyncopal symptomatology.  She has not fallen like this before though.  She has a laceration to the right side of her face and some pain at the right elbow with a skin tear there as well.  She denies any significant injuries anywhere else except for some mild neck soreness.  She did bite her tongue.  She is not anticoagulated.   states that when he found her on the ground, she was conscious and acting fairly normally in the setting of her injury without confusion.     CT head showed Small focus of right frontal subarachnoid hemorrhage   Repeat CT stable and MRI with small SD    ROS:   GENERAL:  Denies fever or recent illness. + weight loss  EYES:  Denies vision change or diplopia  EARS:  Denies hearing loss  CARDIAC:  Denies chest pain  RESPIRATORY:  Denies shortness of breath  SKIN:  Denies rash or lesions   HEM:  Denies excessive bruising  PSYCH:  Denies anxiety or depression  NEURO:  Denies headache, numbness or tingling or lateralizing weakness   :  Denies urinary difficulty  GI: Denies nausea, vomiting, diarrhea or constipation  MUSCULOSKELETAL:  No arthralgias    Allergies   Allergen Reactions    Asa [Aspirin]      nausea    Codeine      dizzy    Meloxicam      Not sure of reaction    Penicillins      rash       Past Medical History:   Diagnosis Date    Diabetes mellitus (HCC)     Hypertension         Past Surgical History:   Procedure

## 2024-06-19 NOTE — CONSULTS
Neurology Consult Note    Reason for Consult: syncope     Chief complaint:syncope     Quincy Gutierrez MD asked me to see Herman Mccarthy in consultation for evaluation of fall.     History of Present Illness:  Herman Mccarthy is a 83 y.o. female who presents with fall. Herman was found down by her  on the ground in the middle of the night. She's been having low back pain, discomfort with urination, nocturia, and frequent overnight urination the past two weeks. She rarely can get lightheaded if she stands up too fast from the toilet and has to sit back down. This occasion of presentation she got up to use the restroom but doesn't recall how she fell. She was awake and alert on the floor when her  found her.  She admits to nearly 70 pounds of weight loss over the past 1 year unintentionally.    She denies any headaches or neck pain.  She denies any weakness, change in sensation, change in vision, or change in hearing.  She denies any dysphagia.  She denies confusion, urinary or bowel incontinence, nausea or vomiting, and denies tongue biting.  She denies any history of passing out or syncope.      Current Facility-Administered Medications:     ferric gluconate (FERRLECIT) 125 mg in sodium chloride 0.9 % 100 mL IVPB, 125 mg, IntraVENous, Q24H, Quincy Barrera MD, Stopped at 06/19/24 1148    amLODIPine (NORVASC) tablet 5 mg, 5 mg, Oral, Daily, Quincy Barrera MD, 5 mg at 06/19/24 0801    insulin glargine (LANTUS) injection vial 8 Units, 8 Units, SubCUTAneous, Nightly, Quincy Barrera MD, 8 Units at 06/18/24 2113    sodium chloride flush 0.9 % injection 5-40 mL, 5-40 mL, IntraVENous, 2 times per day, Quincy Barrera MD, 10 mL at 06/19/24 0801    sodium chloride flush 0.9 % injection 5-40 mL, 5-40 mL, IntraVENous, PRN, Quincy Barrera MD    0.9 % sodium chloride infusion, , IntraVENous, PRN, Quincy Barrera MD    acetaminophen (TYLENOL) tablet 650 mg, 650 mg, Oral, Q4H PRN, Quincy Barrera MD, 650 mg at  right greater than left subdural collection suspicious for tiny subdural hematomas without significant mass effect.      Mild atrophy and chronic small vessel ischemic change.      Right-sided scalp swelling.      Electronically signed by Leonel Byrd MD          Impression:  Syncope  Spell of abnormal behavior  Subarachnoid hemorrhage  Subdural hemorrhage  Dysuria  Weight loss    Herman Mccarthy is a 83 y.o. female who presented with spell of loss of consciousness transiently, with relatively quick return to baseline, which raises concern for cardiogenic syncope. She has been having urinary frequency and nocturia during which time this occurred, potentially orthostatic or vasovagal.    Recommendations:  Obtain a routine EEG.  Obtain CTA head and neck to rule out aneurysmal component to SAH, or flow limiting stenosis contributing to syncope.  Recommend obtaining Cardiology consultation, and recommend orthostatic vitals, telemetry monitoring, and echocardiogram.  In light of unknown cause of loss of consciousness spell, the patient should not to drive or operate machinery for at least 3 months from the last spell. If ever in the future a recurrent spell occurs there may be no driving for an additional 3 months from that time. Activity where loss of conscioussness could prove dangerous to themself or another is recommended against, which includes but is certainly not limited to swimming in a pool and bathing on one's own.  Seizure precautions recommended.  Miley should follow up with Neurology as an outpatient after discharge.    Your medical management. Please call back if any questions or concerns.      Your medical management. Thank you for allowing my participation in Herman's care. Please call if any questions or concerns.    Ronnie Marin D.O.  [facility]      Total face to face time spent performing history, physical examination, counseling, chart review, and documentation was at least

## 2024-06-19 NOTE — CONSULTS
Date/Time    HGB 9.1 06/19/2024 06:37 AM    HCT 27.1 06/19/2024 06:37 AM     BMP:    Lab Results   Component Value Date/Time     06/19/2024 06:37 AM    K 3.9 06/19/2024 06:37 AM    K 4.3 06/18/2024 07:45 AM     06/19/2024 06:37 AM    CO2 25 06/19/2024 06:37 AM    BUN 19 06/19/2024 06:37 AM    CREATININE 0.8 06/19/2024 06:37 AM    CALCIUM 8.4 06/19/2024 06:37 AM    GFRAA >60 05/24/2022 07:58 AM    GFRAA >60 05/22/2012 09:01 AM    LABGLOM 72 06/19/2024 06:37 AM    LABGLOM >60 11/15/2023 08:41 AM     PT/INR:    Lab Results   Component Value Date/Time    PROTIME 13.5 06/18/2024 07:45 AM    INR 1.01 06/18/2024 07:45 AM     PTT:  No results found for: \"APTT\"[APTT    Urinalysis:    Latest Reference Range & Units 06/18/24 08:14   Color, UA Straw/Yellow  Yellow   Clarity, UA Clear  Clear   Glucose, Ur Negative mg/dL Negative   Bilirubin, Urine Negative  Negative   Ketones, Urine Negative mg/dL Negative   Specific Gravity, UA 1.005 - 1.030  1.010   Blood, Urine Negative  SMALL !   Protein, UA Negative mg/dL 100 !   Urobilinogen <2.0 E.U./dL 1.0   Nitrite, Urine Negative  Negative   Leukocyte Esterase, Urine Negative  MODERATE !   Urine Type  Voided   Urine Reflex to Culture  Yes   pH, Urine 5.0 - 8.0  6.0   Hyaline Casts, UA 0 - 8 /LPF 0   WBC, UA 0 - 5 /HPF 79 (H)   RBC, UA 0 - 4 /HPF 9 (H)   Epithelial Cells, UA 0 - 5 /HPF 1   Bacteria, UA None Seen /HPF None Seen   Microscopic Examination  YES   URINALYSIS WITH REFLEX TO CULTURE  Rpt !   !: Data is abnormal  (H): Data is abnormally high  Rpt: View report in Results Review for more information    Urine Culture:   Organism Gram negative nawaf Abnormal    Urine Culture, Routine <10,000 CFU/ml  No further workup         Antibiotic Therapy:  Rocephin    Imaging:   Ct abdomen pelvis   6/18/24  IMPRESSION:  No acute traumatic solid organ injury identified given the reported history  of trauma.     Mild fat stranding surrounds the bladder.  Recommend correlation

## 2024-06-19 NOTE — PROGRESS NOTES
V2.0    [facility] [place]      Name:  Herman Mccarthy /Age/Sex: 1940  (83 y.o. female)   MRN & CSN:  [id number] & [id number] Encounter Date/Time: 2024 7:27 AM EDT   Location:  4318/4318-01 PCP: Enoch Varner MD     Attending:Quincy Barrera MD       Hospital Day: 2    Assessment and Recommendations   Herman Mccarthy is a 83 y.o. female with pmh of HTN, severe protein calorie malnutrition, and DMII  who presents with fall and SAH.     Pt was found on the floor by her  who woke up to the sound of her falling. She was conscious and not confused.  Pt does not remember what happened. She remember going to bed last night feeling ok, then remembers waking up to her . She denies any fever, chills, cough, chest pain, headache before all of this or hx of seizures. She has been having dyuria for the last 2-3 weeks with some lower back pain. She has a tongue bite on exam. She has multiple bruises on her Rt side (face and body). She is not sure if she lost control of her bowel or bladder.     CT head showed Small focus of right frontal subarachnoid hemorrhage   CT abd/pelvix was ok (possible cystitis) (done because she was supposed to do it as outpt due to her wt loss). Other xrays were ok . EKG was ok     BP was high so she was started on Cardene drip . She was given IV keppra      She has been having significant wt loss over the last 6 months. Her Hb was 6.8 recently with low iron studies,  and she had EGD and colonoscopy was done, noted Haital hernia and severe diverticulosis and colitis but not acute bleeding noted . Pathology from ileocecal valve showed mild chronic active colitis with evidence of ulceration . Path from colon showed melanosis coli. Hb today is 10.7. She tells me that she has good appetite but the nurses who come to her house does not give her enough food due to her uncontrolled diabetes . She thinks she lost more than 70 lbs     SAH  -Repeat CT and MRI are stable. MRI  maintained without evidence of an acute infarct.  There is no evidence of hydrocephalus.  Mild generalized parenchymal volume loss. Remote lacunar infarct involving the right body of the caudate nucleus. ORBITS: The visualized portion of the orbits demonstrate no acute abnormality. SINUSES: The visualized paranasal sinuses and mastoid air cells demonstrate no acute abnormality. SOFT TISSUES/SKULL:  Right frontal scalp laceration/contusion.  No acute calvarial fracture. CERVICAL SPINE: BONES/ALIGNMENT: There is no acute fracture or traumatic malalignment. DEGENERATIVE CHANGES: No significant degenerative changes. SOFT TISSUES: The prevertebral soft tissues are unremarkable.     1. Small focus of right frontal subarachnoid hemorrhage. 2. Right frontal scalp laceration/contusion. No acute calvarial fracture. 3. No acute fracture or traumatic malalignment of the cervical spine.     XR ELBOW RIGHT (2 VIEWS)    Result Date: 6/18/2024  EXAMINATION: 2 XRAY VIEWS OF THE RIGHT ELBOW 6/18/2024 7:37 am COMPARISON: None. HISTORY: ORDERING SYSTEM PROVIDED HISTORY: trauma TECHNOLOGIST PROVIDED HISTORY: Reason for exam:->trauma Reason for Exam: trauma FINDINGS: There is mild prominence of the anterior fat pad of the elbow.  However, no acute fracture or malalignment noted.  No significant degenerative change     1. No acute fracture or malalignment. 2. Mild prominence of the anterior fat pad of the elbow. This can be seen in the setting of occult fracture..  Consider clinical-radiographic follow-up     XR CHEST PORTABLE    Result Date: 6/18/2024  EXAMINATION: ONE XRAY VIEW OF THE CHEST 6/18/2024 7:37 am COMPARISON: None. HISTORY: ORDERING SYSTEM PROVIDED HISTORY: trauma TECHNOLOGIST PROVIDED HISTORY: Reason for exam:->trauma Reason for Exam: trauma FINDINGS: Heart size is normal.  Mediastinal contours are normal.  Pulmonary vascularity is normal.  No focal lung consolidation noted     No acute abnormality.       CBC:   Recent Labs

## 2024-06-19 NOTE — PROGRESS NOTES
Speech Language Pathology  Facility/Department:[facility]  Bedside Swallow Evaluation                                                       Name: Herman Mccarthy  : 1940  MRN: [id number]    Patient Diagnosis(es):   Patient Active Problem List    Diagnosis Date Noted    Subarachnoid hemorrhage (HCC) 2024    Anemia 2024    Microcytic hypochromic anemia 2024    Cachexia (HCC) 2024    Type 1 diabetes mellitus with hyperglycemia (HCC) 2024    Hypertensive urgency 2024    Proteinuria 2024    Urge incontinence of urine 2024    Diabetes mellitus type 2 with complications (HCC) 2023    Severe protein-calorie malnutrition (HCC) 2021    Diabetes mellitus type 1.5, managed as type 2 (HCC) 2021    Conductive hearing loss, bilateral 2020    Primary osteoarthritis of both knees 2017    Constipation 2015    Essential hypertension 2013       Past Medical History:   Diagnosis Date    Diabetes mellitus (HCC)     Hypertension      Past Surgical History:   Procedure Laterality Date    CHOLECYSTECTOMY      COLECTOMY N/A 2021    LAPAROSCOPIC ASSISTED TRANSVERSE COLON RESECTION performed by Mayank Maradiaga MD at [facility] OR    COLONOSCOPY N/A 2021    COLONOSCOPY WITH BIOPSY performed by uRel Caraballo MD at [facility] ENDOSCOPY    COLONOSCOPY  2021    COLONOSCOPY SUBMUCOSAL/BOTOX INJECTION performed by Ruel Caraballo MD at [facility] ENDOSCOPY    COLONOSCOPY  2021    COLONOSCOPY POLYPECTOMY SNARE/COLD BIOPSY performed by Ruel Caraballo MD at [facility] ENDOSCOPY    COLONOSCOPY N/A 2024    COLONOSCOPY BIOPSY performed by Corby Banda MD at [facility] ENDOSCOPY    UPPER GASTROINTESTINAL ENDOSCOPY N/A 2024    ESOPHAGOGASTRODUODENOSCOPY BIOPSY performed by Corby Banda MD at [facility] ENDOSCOPY       Reason for Referral:  Herman Mccarthy  was referred for a Speech Therapy evaluation to assess swallow

## 2024-06-19 NOTE — PLAN OF CARE
Reason for Consult: syncope, concern for seizure      Herman Mccarthy is a 83 y.o. y/o female with history significant for HTN, malnutrition, and DM II.    Per my interview with the patient she remembers going to bed on the night of 6/17 feeling ok. The next thing she remembers is waking up to her  standing over her and being carried to her bed. She denies any memory of falling, tripping, or hitting her head. Per chart review, patient was found down by her  after he heard her fall in the next room. Patient was taken to [facility] ED. Patient had multiple bruises on her L arm, R face, a R head lac, and a tongue bite.  CT head showed a small, R frontal SAH. Initial BP was 215/160, and patient started on a nicardipine gtt, which has since been weaned off. CT C spine was unremarkable. Patient transferred to [facility] for further evaluation and treatment. Repeat CT head showed no change in SAH. MRI brain showed the stable SAH, as well as trace bilateral SDH.     Patient denies any recent fever, chills, cough, chest pain, headache, or h/o seizures. Patient states she has been sleeping poorly, and has had lost ~70lbs in the last 6 months. Hgb in [place] ED was 6.5, but has improved to 9.1. Patient received IV iron on arrival to [facility]. Ferritin improved from 10.9 to 58. Iron, folate, and B12 labs are pending. HgbA1c in May was 6.3. Patient also endorses buring when she urinates, and UA shows moderate amount of leukocytes.     Of note, she is not on any anticoagulation.       REVIEW OF SYSTEMS:   Constitutional- No weight loss or fevers   Neurologic- No vision changes. No focal motor/sensory deficits. C/o slight headache.     Physical Exam   PHYSICAL EXAM:  Vitals:    06/19/24 0125 06/19/24 0223 06/19/24 0504 06/19/24 0758   BP: 105/61 (!) 140/75 126/69 (!) 147/66   Pulse: 63 70 61 62   Resp: 17 18 16 18   Temp:   97.7 °F (36.5 °C) 97.1 °F (36.2 °C)   TempSrc:   Axillary Axillary   SpO2: 93% 96% 94% 96%   Weight:  has no memory of getting up to move around in the night, nor does she remember falling and hitting her head. On exam, patient is neurologically intact, however on admission to [facility] ED patient was confused, and possibly post-ictal. Concern for seizure vs syncope. Seizure is possible given her weight loss, poor sleep quality, and UTI.     RECOMMENDATIONS:  -q4h neuro checks  -routine EEG  -MRI brain w and w/o (completed)  -no need for AED at this time.     Management and plan discussed with:   MARY ANN Arciniega - [facility]   Neurology  6/19/2024 12:15 PM  [employer]: [facility] Neurology

## 2024-06-19 NOTE — PROGRESS NOTES
Physical Therapy/Occupational Therapy   Hold    Orders received and chart reviewed. Per chart pt is awaiting Neuro/NS consults. PT/OT will hold until cleared for therapy evals per neuro/NS teams. RN aware.     Addendum 1317: Neurosurgery saw patient. Now being put on cEEG. Will HOLD today and f/u as appropriate. Discussed w/ nurse.    Addendum: RN contacted PT/OT to inform us that pt just having routine EEG. Pt is still off the floor for test at 1542.  under impression that pt can be discharged home possibly after PT and OT evaluations.     Mahad Barnes, PT, DPT   Reny Lennox OTR/L #[id number]    Addendum 16:04: Patient returned to floor with new PT/OT assignment. After chart reviewed, pt will be evaluated by therapy in AM. Pt is currently pending cardiology consult and results to return from CTA and EEG. Attending MD in agreement that patient can evaluated in AM and was contacted by this OT via [employer]. RN aware and in agreement with plan. Thank you.    Tawnya Salazar, OTR/L [id number]  Lissa Stephens, PT, DPT  [id number]      FAMILY HISTORY:  Family history of diabetes mellitus  Family history of systemic lupus erythematosus  FH: breast cancer, grandmother    Sibling  Still living? Unknown  Family history of abscess of skin or subcutaneous tissue, Age at diagnosis: Age Unknown    Grandparent  Still living? Unknown  Family history of cerebral aneurysm, Age at diagnosis: Age Unknown    Aunt  Still living? Unknown  Family history of cerebral aneurysm, Age at diagnosis: Age Unknown

## 2024-06-19 NOTE — PLAN OF CARE
Problem: Chronic Conditions and Co-morbidities  Goal: Patient's chronic conditions and co-morbidity symptoms are monitored and maintained or improved  Outcome: Progressing  Flowsheets (Taken 6/19/2024 0101)  Care Plan - Patient's Chronic Conditions and Co-Morbidity Symptoms are Monitored and Maintained or Improved:   Monitor and assess patient's chronic conditions and comorbid symptoms for stability, deterioration, or improvement   Collaborate with multidisciplinary team to address chronic and comorbid conditions and prevent exacerbation or deterioration   Update acute care plan with appropriate goals if chronic or comorbid symptoms are exacerbated and prevent overall improvement and discharge     Problem: Discharge Planning  Goal: Discharge to home or other facility with appropriate resources  Outcome: Progressing  Flowsheets  Taken 6/19/2024 0101 by Brennan Hernandez RN  Discharge to home or other facility with appropriate resources:   Identify barriers to discharge with patient and caregiver   Arrange for needed discharge resources and transportation as appropriate  Taken 6/18/2024 1716 by Catalina Bland RN  Discharge to home or other facility with appropriate resources: Identify barriers to discharge with patient and caregiver     Problem: Safety - Adult  Goal: Free from fall injury  Outcome: Progressing  Flowsheets (Taken 6/19/2024 0101)  Free From Fall Injury: Instruct family/caregiver on patient safety  Note: Call light within reach, bed alarm on, bed at lowest position.      Problem: Skin/Tissue Integrity  Goal: Absence of new skin breakdown  Description: 1.  Monitor for areas of redness and/or skin breakdown  2.  Assess vascular access sites hourly  3.  Every 4-6 hours minimum:  Change oxygen saturation probe site  4.  Every 4-6 hours:  If on nasal continuous positive airway pressure, respiratory therapy assess nares and determine need for appliance change or resting period.  Outcome:

## 2024-06-19 NOTE — FLOWSHEET NOTE
Orthostatics completed on patient:     06/19/24 8787   Vital Signs   Orthostatic B/P and Pulse? Yes   Blood Pressure Lying 160/65   Pulse Lying 68 PER MINUTE   Blood Pressure Sitting 162/64   Pulse Sitting 75 PER MINUTE   Blood Pressure Standing 170/71   Pulse Standing 81 PER MINUTE

## 2024-06-20 VITALS
RESPIRATION RATE: 18 BRPM | SYSTOLIC BLOOD PRESSURE: 150 MMHG | WEIGHT: 101.63 LBS | BODY MASS INDEX: 16.33 KG/M2 | DIASTOLIC BLOOD PRESSURE: 72 MMHG | TEMPERATURE: 98.1 F | HEART RATE: 62 BPM | OXYGEN SATURATION: 97 % | HEIGHT: 66 IN

## 2024-06-20 LAB
ALBUMIN SERPL-MCNC: 3.6 G/DL (ref 3.4–5)
ANION GAP SERPL CALCULATED.3IONS-SCNC: 12 MMOL/L (ref 3–16)
ANISOCYTOSIS BLD QL SMEAR: ABNORMAL
BASOPHILS # BLD: 0 K/UL (ref 0–0.2)
BASOPHILS NFR BLD: 0.3 %
BUN SERPL-MCNC: 14 MG/DL (ref 7–20)
CALCIUM SERPL-MCNC: 8.5 MG/DL (ref 8.3–10.6)
CHLORIDE SERPL-SCNC: 102 MMOL/L (ref 99–110)
CO2 SERPL-SCNC: 24 MMOL/L (ref 21–32)
CREAT SERPL-MCNC: 0.7 MG/DL (ref 0.6–1.2)
DEPRECATED RDW RBC AUTO: 27.6 % (ref 12.4–15.4)
EOSINOPHIL # BLD: 0.1 K/UL (ref 0–0.6)
EOSINOPHIL NFR BLD: 1.4 %
FOLATE SERPL-MCNC: 8.89 NG/ML (ref 4.78–24.2)
GFR SERPLBLD CREATININE-BSD FMLA CKD-EPI: 85 ML/MIN/{1.73_M2}
GLUCOSE BLD-MCNC: 246 MG/DL (ref 70–99)
GLUCOSE BLD-MCNC: 289 MG/DL (ref 70–99)
GLUCOSE SERPL-MCNC: 214 MG/DL (ref 70–99)
HCT VFR BLD AUTO: 26.8 % (ref 36–48)
HGB BLD-MCNC: 8.8 G/DL (ref 12–16)
IGA SERPL-MCNC: 241 MG/DL (ref 70–400)
LYMPHOCYTES # BLD: 1.2 K/UL (ref 1–5.1)
LYMPHOCYTES NFR BLD: 25 %
MCH RBC QN AUTO: 26.9 PG (ref 26–34)
MCHC RBC AUTO-ENTMCNC: 32.9 G/DL (ref 31–36)
MCV RBC AUTO: 82 FL (ref 80–100)
MICROCYTES BLD QL SMEAR: ABNORMAL
MONOCYTES # BLD: 0.8 K/UL (ref 0–1.3)
MONOCYTES NFR BLD: 16.7 %
NEUTROPHILS # BLD: 2.8 K/UL (ref 1.7–7.7)
NEUTROPHILS NFR BLD: 56.6 %
OVALOCYTES BLD QL SMEAR: ABNORMAL
PERFORMED ON: ABNORMAL
PERFORMED ON: ABNORMAL
PHOSPHATE SERPL-MCNC: 2.4 MG/DL (ref 2.5–4.9)
PLATELET # BLD AUTO: 120 K/UL (ref 135–450)
PMV BLD AUTO: 8.2 FL (ref 5–10.5)
POTASSIUM SERPL-SCNC: 4.2 MMOL/L (ref 3.5–5.1)
RBC # BLD AUTO: 3.27 M/UL (ref 4–5.2)
SCHISTOCYTES BLD QL SMEAR: ABNORMAL
SODIUM SERPL-SCNC: 138 MMOL/L (ref 136–145)
TISSUE TRANSGLUTAMINASE IGA: <0.5 U/ML (ref 0–14)
VIT B12 SERPL-MCNC: 256 PG/ML (ref 211–911)
WBC # BLD AUTO: 4.9 K/UL (ref 4–11)

## 2024-06-20 PROCEDURE — 97530 THERAPEUTIC ACTIVITIES: CPT

## 2024-06-20 PROCEDURE — 97116 GAIT TRAINING THERAPY: CPT

## 2024-06-20 PROCEDURE — 6370000000 HC RX 637 (ALT 250 FOR IP): Performed by: HOSPITALIST

## 2024-06-20 PROCEDURE — 97535 SELF CARE MNGMENT TRAINING: CPT

## 2024-06-20 PROCEDURE — 85025 COMPLETE CBC W/AUTO DIFF WBC: CPT

## 2024-06-20 PROCEDURE — 80069 RENAL FUNCTION PANEL: CPT

## 2024-06-20 PROCEDURE — 97162 PT EVAL MOD COMPLEX 30 MIN: CPT

## 2024-06-20 PROCEDURE — 97166 OT EVAL MOD COMPLEX 45 MIN: CPT

## 2024-06-20 PROCEDURE — 92526 ORAL FUNCTION THERAPY: CPT

## 2024-06-20 PROCEDURE — 99233 SBSQ HOSP IP/OBS HIGH 50: CPT | Performed by: NURSE PRACTITIONER

## 2024-06-20 PROCEDURE — 36415 COLL VENOUS BLD VENIPUNCTURE: CPT

## 2024-06-20 RX ORDER — UREA 10 %
1000 LOTION (ML) TOPICAL DAILY
Qty: 30 TABLET | Refills: 5 | Status: SHIPPED | OUTPATIENT
Start: 2024-06-20 | End: 2024-09-18

## 2024-06-20 RX ORDER — CEFDINIR 300 MG/1
300 CAPSULE ORAL 2 TIMES DAILY
Qty: 6 CAPSULE | Refills: 0 | Status: SHIPPED | OUTPATIENT
Start: 2024-06-21 | End: 2024-06-20

## 2024-06-20 RX ORDER — CYANOCOBALAMIN 1000 UG/ML
1000 INJECTION, SOLUTION INTRAMUSCULAR; SUBCUTANEOUS DAILY
Status: DISCONTINUED | OUTPATIENT
Start: 2024-06-20 | End: 2024-06-20 | Stop reason: HOSPADM

## 2024-06-20 RX ORDER — CEFDINIR 300 MG/1
300 CAPSULE ORAL 2 TIMES DAILY
Qty: 6 CAPSULE | Refills: 0 | Status: SHIPPED | OUTPATIENT
Start: 2024-06-21 | End: 2024-06-24

## 2024-06-20 RX ORDER — MULTIVITAMIN WITH IRON
1 TABLET ORAL DAILY
Status: DISCONTINUED | OUTPATIENT
Start: 2024-06-20 | End: 2024-06-20 | Stop reason: HOSPADM

## 2024-06-20 RX ORDER — INSULIN LISPRO 100 [IU]/ML
0-8 INJECTION, SOLUTION INTRAVENOUS; SUBCUTANEOUS
Status: DISCONTINUED | OUTPATIENT
Start: 2024-06-20 | End: 2024-06-20 | Stop reason: HOSPADM

## 2024-06-20 RX ORDER — INSULIN LISPRO 100 [IU]/ML
0-4 INJECTION, SOLUTION INTRAVENOUS; SUBCUTANEOUS NIGHTLY
Status: DISCONTINUED | OUTPATIENT
Start: 2024-06-20 | End: 2024-06-20 | Stop reason: HOSPADM

## 2024-06-20 RX ADMIN — INSULIN LISPRO 2 UNITS: 100 INJECTION, SOLUTION INTRAVENOUS; SUBCUTANEOUS at 08:19

## 2024-06-20 NOTE — PROGRESS NOTES
Pt refused echo.  MD Dr Barrera notified. Education provided.    Electronically signed by Sharonda Rosario RN on 6/20/2024 at 12:07 PM

## 2024-06-20 NOTE — PLAN OF CARE
Problem: Chronic Conditions and Co-morbidities  Goal: Patient's chronic conditions and co-morbidity symptoms are monitored and maintained or improved  6/20/2024 1008 by Sharonda Rosario RN  Outcome: Completed     Problem: Discharge Planning  Goal: Discharge to home or other facility with appropriate resources  6/20/2024 1008 by Sharonda Rosario RN  Outcome: Completed     Problem: Safety - Adult  Goal: Free from fall injury  6/20/2024 1008 by Sharonda Rosario RN  Outcome: Completed     Problem: Skin/Tissue Integrity  Goal: Absence of new skin breakdown  Description: 1.  Monitor for areas of redness and/or skin breakdown  2.  Assess vascular access sites hourly  3.  Every 4-6 hours minimum:  Change oxygen saturation probe site  4.  Every 4-6 hours:  If on nasal continuous positive airway pressure, respiratory therapy assess nares and determine need for appliance change or resting period.  6/20/2024 1008 by Sharodna Rosario RN  Outcome: Completed     Problem: Pain  Goal: Verbalizes/displays adequate comfort level or baseline comfort level  6/20/2024 1008 by Sharonda Rosario RN  Outcome: Completed     Problem: Cardiovascular - Adult  Goal: Maintains optimal cardiac output and hemodynamic stability  6/20/2024 1008 by Sharonda Rosario RN  Outcome: Completed     Problem: Cardiovascular - Adult  Goal: Absence of cardiac dysrhythmias or at baseline  6/20/2024 1008 by Sharonda Rosario RN  Outcome: Completed     Problem: Metabolic/Fluid and Electrolytes - Adult  Goal: Electrolytes maintained within normal limits  6/20/2024 1008 by Sharonda Rosario RN  Outcome: Completed     Problem: Metabolic/Fluid and Electrolytes - Adult  Goal: Hemodynamic stability and optimal renal function maintained  Outcome: Completed     Problem: Metabolic/Fluid and Electrolytes - Adult  Goal: Glucose maintained within prescribed range  6/20/2024 1008 by Sharonda Rosario RN  Outcome: Completed     Problem: ABCDS Injury Assessment  Goal: Absence of physical

## 2024-06-20 NOTE — PROGRESS NOTES
Electrophysiology - PROGRESS NOTE    Admit Date: 6/18/2024     Chief Complaint: S/p fall     Interval History:   Patient seen and examined and notes reviewed. Patient is being followed for s/p fall.  Patient was originally seen at [facility] after she presented to the emergency room complaining of an unwitnessed fall.   states that he was woken from sleep by the crashing sound of her fall.  Patient had no recollection if she was dizzy or lightheaded or had symptoms prior to the event.  She had a large laceration to the right side of her face and some pain at the right elbow.  She had a CT of the head that showed a right frontal subarachnoid hemorrhage.  Patient was transferred to [facility] and had an MRI that showed an SAH and SDH.  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  She was seen by EP due to the possibility of being a syncopal event.  Patient was confused with a tongue bite upon physical exam by the primary team.  She is also being treated for cystitis.  Her telemetry is showing normal sinus rhythm.  An echocardiogram was ordered as well as a 2-week CAM on discharge however patient  state that they were told she could go home after physical therapy session and she is leaving and not doing any more tests.  They may or may not consider doing any further tests outpatient.  Patient states that she is fine and nothing happened.    In: 1450 [P.O.:1450]  Out: 1425    Wt Readings from Last 2 Encounters:   06/20/24 46.1 kg (101 lb 10.1 oz)   06/18/24 48.5 kg (107 lb)       Data:   Scheduled Meds:   Scheduled Meds:   insulin lispro  0-8 Units SubCUTAneous TID WC    insulin lispro  0-4 Units SubCUTAneous Nightly    ferric gluconate (FERRLECIT) 125 mg in sodium chloride 0.9 % 100 mL

## 2024-06-20 NOTE — PROGRESS NOTES
Physical Therapy  Facility/Department: [address]  Physical Therapy Initial Assessment/Treatment    Name: Herman Mccarthy  : 1940  MRN: [id number]  Date of Service: 2024    Discharge Recommendations:  24 hour supervision or assist, Outpatient PT   PT Equipment Recommendations  Equipment Needed: Yes  Mobility Devices: Walker  Walker: Rolling      Patient Diagnosis(es): The encounter diagnosis was Syncope, unspecified syncope type.  Past Medical History:  has a past medical history of Diabetes mellitus (HCC) and Hypertension.  Past Surgical History:  has a past surgical history that includes Cholecystectomy; Colonoscopy (N/A, 2021); Colonoscopy (2021); Colonoscopy (2021); colectomy (N/A, 2021); Upper gastrointestinal endoscopy (N/A, 2024); and Colonoscopy (N/A, 2024).    Assessment   Body Structures, Functions, Activity Limitations Requiring Skilled Therapeutic Intervention: Decreased functional mobility ;Decreased cognition;Decreased balance  Assessment: 83 y.o. female with pmh of HTN, severe protein calorie malnutrition, and DMII who presents with fall and SAH. Pt currently requiring Ax1 for bed mobility, transfers and amb with RW. Pt is below her baseline and would benefit from further skilled PT to maximize safety and independence with functional mobility. Pt and  reporting they would prefer OP PT at d/c. Will continue to follow.  Treatment Diagnosis: Decreased functional mobility  Therapy Prognosis: Good  Decision Making: Medium Complexity  Barriers to Learning: cog/memory  Requires PT Follow-Up: Yes  Activity Tolerance  Activity Tolerance: Patient tolerated evaluation without incident;Patient tolerated treatment well     Plan   Physical Therapy Plan  General Plan:  (2-5)  Current Treatment Recommendations: Strengthening, Balance training, Functional mobility training, Transfer training, Gait training, Endurance training, Safety education & training, Therapeutic

## 2024-06-20 NOTE — PLAN OF CARE
Problem: Chronic Conditions and Co-morbidities  Goal: Patient's chronic conditions and co-morbidity symptoms are monitored and maintained or improved  Outcome: Progressing  Flowsheets (Taken 6/20/2024 0346)  Care Plan - Patient's Chronic Conditions and Co-Morbidity Symptoms are Monitored and Maintained or Improved:   Monitor and assess patient's chronic conditions and comorbid symptoms for stability, deterioration, or improvement   Collaborate with multidisciplinary team to address chronic and comorbid conditions and prevent exacerbation or deterioration   Update acute care plan with appropriate goals if chronic or comorbid symptoms are exacerbated and prevent overall improvement and discharge     Problem: Discharge Planning  Goal: Discharge to home or other facility with appropriate resources  Outcome: Progressing  Flowsheets (Taken 6/20/2024 0346)  Discharge to home or other facility with appropriate resources:   Identify barriers to discharge with patient and caregiver   Arrange for needed discharge resources and transportation as appropriate     Problem: Safety - Adult  Goal: Free from fall injury  Outcome: Progressing  Flowsheets (Taken 6/20/2024 0346)  Free From Fall Injury: Instruct family/caregiver on patient safety  Note: Call light within reach, bed alarm on, bed at lowest position, family at bed side       Problem: Skin/Tissue Integrity  Goal: Absence of new skin breakdown  Description: 1.  Monitor for areas of redness and/or skin breakdown  2.  Assess vascular access sites hourly  3.  Every 4-6 hours minimum:  Change oxygen saturation probe site  4.  Every 4-6 hours:  If on nasal continuous positive airway pressure, respiratory therapy assess nares and determine need for appliance change or resting period.  Outcome: Progressing  Note: No new skin breakdown, pt able to turn self.      Problem: Pain  Goal: Verbalizes/displays adequate comfort level or baseline comfort level  Outcome:

## 2024-06-20 NOTE — DISCHARGE SUMMARY
V2.0  Discharge Summary    Name:  Herman Mccarthy /Age/Sex: 1940 (83 y.o. female)   Admit Date: 2024  Discharge Date: 24    MRN & CSN:  [id number] & [id number] Encounter Date and Time 24 9:18 AM EDT    Attending:  No att. providers found Discharging Provider: Quincy Barrera MD       Hospital Course:     Brief HPI: Herman Mccarthy is a 83 y.o. female with pmh of HTN, severe protein calorie malnutrition, and DMII who presents with fall and SAH.     Pt was found on the floor by her  who woke up to the sound of her falling. She was conscious and not confused per his report but was confused on the ED assessment and mine.  Pt does not remember what happened. She remember going to bed last night feeling ok, then remembers waking up to her . She denies any fever, chills, cough, chest pain, headache before all of this or hx of seizures. She has been having dyuria for the last 2-3 weeks with some lower back pain. She has a tongue bite on exam. She has multiple bruises on her Rt side (face and body). She is not sure if she lost control of her bowel or bladder.     CT head showed Small focus of right frontal subarachnoid hemorrhage   CT abd/pelvix was ok (possible cystitis) (done because she was supposed to do it as outpt due to her wt loss).      BP was high so she was started on Cardene drip initially but stopped     SAH-traumatic 2/2 fall .Repeat CT and MRI are stable. MRI showed possible small SDH.      Syncope/fall   Possible seizure   -Story is not very clear  -She seemed slightly confused and slow on admission ?post ictal . She has evidence of a tongue bite  -She has not been sleeping well for the last several months due to urinary frequency /nocturia . She also has been having recurrent hypoglycemia   -I think its possible she had a seizure 2/2 hypoglycemia, sleep deprivation and UTI although neurology does not seem to think so ,and we were worried about cardiac etiology. EP  Pulmonary vascularity is normal.  No focal lung consolidation noted     No acute abnormality.       CBC:   Recent Labs     06/18/24  1906 06/19/24  0637 06/20/24  0706   WBC 7.3 5.5 4.9   HGB 9.9* 9.1* 8.8*    128* 120*     BMP:    Recent Labs     06/18/24  0745 06/19/24  0637 06/20/24  0706   * 137 138   K 4.3 3.9 4.2    100 102   CO2 24 25 24   BUN 14 19 14   CREATININE 0.7 0.8 0.7   GLUCOSE 204* 208* 214*     Hepatic:   Recent Labs     06/18/24  0745   AST 18   ALT 11   BILITOT 0.6   ALKPHOS 72     Lipids:   Lab Results   Component Value Date/Time    CHOL 127 06/19/2024 06:37 AM    HDL 54 06/19/2024 06:37 AM    TRIG 80 06/19/2024 06:37 AM     Hemoglobin A1C:   Lab Results   Component Value Date/Time    LABA1C 6.3 05/17/2024 05:56 AM     TSH:   Lab Results   Component Value Date/Time    TSH 3.41 05/06/2024 09:07 AM     Troponin: No results found for: \"TROPONINT\"  Lactic Acid: No results for input(s): \"LACTA\" in the last 72 hours.  BNP:   Recent Labs     06/18/24  0745   PROBNP 751*     UA:  Lab Results   Component Value Date/Time    NITRU Negative 06/18/2024 08:14 AM    COLORU Yellow 06/18/2024 08:14 AM    PHUR 6.0 06/18/2024 08:14 AM    PHUR 5.5 03/17/2019 05:00 AM    WBCUA 79 06/18/2024 08:14 AM    RBCUA 9 06/18/2024 08:14 AM    BACTERIA None Seen 06/18/2024 08:14 AM    CLARITYU Clear 06/18/2024 08:14 AM    LEUKOCYTESUR MODERATE 06/18/2024 08:14 AM    UROBILINOGEN 1.0 06/18/2024 08:14 AM    BILIRUBINUR Negative 06/18/2024 08:14 AM    BLOODU SMALL 06/18/2024 08:14 AM    GLUCOSEU Negative 06/18/2024 08:14 AM    KETUA Negative 06/18/2024 08:14 AM     Urine Cultures:   Lab Results   Component Value Date/Time    LABURIN <10,000 CFU/ml  No further workup   06/18/2024 08:14 AM     Blood Cultures: No results found for: \"BC\"  No results found for: \"BLOODCULT2\"  Organism:   Lab Results   Component Value Date/Time    ORG Gram negative nawaf 06/18/2024 08:14 AM       Time Spent Discharging patient 31

## 2024-06-20 NOTE — CARE COORDINATION
9:54 AM  Patient and family refusing CAM monitor; DME recs from therapy; and HHC.   Patient and family have a ride home. CM educated patient and . They VU. Dc with scrubs as her clothes were bloody from fall.     Electronically signed by Terry Wagner RN, CM on 6/20/2024 at 9:55 AM.  Phone: [phone]  Fax: [phone]      7:50 AM  Awaiting therapy ROB yates consulted. Plan for echo and CAM monitor at DC per EP.   CM following.     Electronically signed by Terry Wagner RN, FRANTZ on 6/20/2024 at 7:51 AM.  Phone: [phone]  Fax: [phone]

## 2024-06-20 NOTE — NURSE NAVIGATOR
Late Entry 1045 AM 2 week CAM monitor #  [id number] applied per order  Dr. Andrews .  Instructions given and questions answered.  Bedside nurse aware.

## 2024-06-20 NOTE — PROGRESS NOTES
Occupational Therapy  Facility/Department: [address]  Occupational Therapy Initial Assessment and Treatment    Name: Herman Mccarthy  : 1940  MRN: [id number]  Date of Service: 2024    Discharge Recommendations:  24 hour supervision or assist, Home with Home health OT  OT Equipment Recommendations  Equipment Needed: Yes  Mobility Devices: ADL Assistive Devices  ADL Assistive Devices: Shower Chair with back (BSC (for frequent use during nigthtime))    HOME HEALTH CARE: LEVEL 1 STANDARD    - Initial home health evaluation to occur within 24-48 hours, in patient home   - Therapy to evaluate with goal of regaining prior level of functioning   - Therapy to evaluate if patient has Home Health Aide needs for personal care         Patient Diagnosis(es): The encounter diagnosis was Syncope, unspecified syncope type.      Treatment Diagnosis: impaired ADLs and functional transfers/mobility      Assessment   Performance deficits / Impairments: Decreased functional mobility ;Decreased ADL status;Decreased balance  Assessment: Presenting s/p fall - found to have SAH. At baseline, pt lives w/ spouse and pt independent w/ ADLs, IADLs, and ambulation w/o AD. Today, pt is mildly unsteady and requiring use of RW, which is novel to pt. Pt is impulsive and demonstrating decreased insight into situation and deficits. Recommend 24 hr A and HHOT. Discussed recommendation for shower chair and BSC (as pt gets up to go to the bathroom 8-10x/night). Emphasized w/ pt/spouse recommendation for close supervision initially. Will continue to follow during admission and progress as tolerated. Pt encouraged to be OOB and to ambulate w/ assist of nursing staff.  Treatment Diagnosis: impaired ADLs and functional transfers/mobility  Prognosis: Good  Decision Making: Medium Complexity  REQUIRES OT FOLLOW-UP: Yes  Activity Tolerance  Activity Tolerance: Patient Tolerated treatment well  Activity Tolerance Comments: tolerated > 15 minutes of  Treatment Minutes:  54 Minutes    Total Treatment Minutes:  69         Reny High OTR/L #[id number]

## 2024-06-20 NOTE — PROGRESS NOTES
Speech Language Pathology  Facility/Department:[facility]  Dysphagia treatment  Discharge                                                        Name: Herman Mccarthy  : 1940  MRN: [id number]    Patient Diagnosis(es):   Patient Active Problem List    Diagnosis Date Noted    Seizure with provoking factor (HCC) 2024    Subarachnoid hemorrhage (HCC) 2024    Anemia 2024    Microcytic hypochromic anemia 2024    Cachexia (HCC) 2024    Type 1 diabetes mellitus with hyperglycemia (HCC) 2024    Hypertensive urgency 2024    Proteinuria 2024    Urge incontinence of urine 2024    Diabetes mellitus type 2 with complications (HCC) 2023    Severe protein-calorie malnutrition (HCC) 2021    Diabetes mellitus type 1.5, managed as type 2 (HCC) 2021    Conductive hearing loss, bilateral 2020    Primary osteoarthritis of both knees 2017    Constipation 2015    Essential hypertension 2013       Past Medical History:   Diagnosis Date    Diabetes mellitus (HCC)     Hypertension      Past Surgical History:   Procedure Laterality Date    CHOLECYSTECTOMY      COLECTOMY N/A 2021    LAPAROSCOPIC ASSISTED TRANSVERSE COLON RESECTION performed by Mayank Maradiaga MD at [facility] OR    COLONOSCOPY N/A 2021    COLONOSCOPY WITH BIOPSY performed by Ruel Caraballo MD at [facility] ENDOSCOPY    COLONOSCOPY  2021    COLONOSCOPY SUBMUCOSAL/BOTOX INJECTION performed by Ruel Caraballo MD at [facility] ENDOSCOPY    COLONOSCOPY  2021    COLONOSCOPY POLYPECTOMY SNARE/COLD BIOPSY performed by Ruel Caraballo MD at [facility] ENDOSCOPY    COLONOSCOPY N/A 2024    COLONOSCOPY BIOPSY performed by Corby Banda MD at [facility] ENDOSCOPY    UPPER GASTROINTESTINAL ENDOSCOPY N/A 2024    ESOPHAGOGASTRODUODENOSCOPY BIOPSY performed by Corby Banda MD at [facility] ENDOSCOPY       Reason for Referral:  Herman Mccarthy  was referred

## 2024-06-20 NOTE — PROCEDURES
PROCEDURE NOTE  Date: 2024   Name: Herman Mccarthy  YOB: 1940    Name: Herman Mccarthy   : 1940   Interpreting Physician: Mima Henao MD   Referring Physician: Jeff Bryant MD   Date of EE2024    Clinical History:History of seizures    Current Antiepileptic Medications: Current Facility-Administered Medications: insulin lispro (HUMALOG,ADMELOG) injection vial 0-8 Units, 0-8 Units, SubCUTAneous, TID WC  insulin lispro (HUMALOG,ADMELOG) injection vial 0-4 Units, 0-4 Units, SubCUTAneous, Nightly  ferric gluconate (FERRLECIT) 125 mg in sodium chloride 0.9 % 100 mL IVPB, 125 mg, IntraVENous, Q24H  insulin glargine (LANTUS) injection vial 8 Units, 8 Units, SubCUTAneous, Nightly  sodium chloride flush 0.9 % injection 5-40 mL, 5-40 mL, IntraVENous, 2 times per day  sodium chloride flush 0.9 % injection 5-40 mL, 5-40 mL, IntraVENous, PRN  0.9 % sodium chloride infusion, , IntraVENous, PRN  acetaminophen (TYLENOL) tablet 650 mg, 650 mg, Oral, Q4H PRN  ondansetron (ZOFRAN-ODT) disintegrating tablet 4 mg, 4 mg, Oral, Q8H PRN **OR** ondansetron (ZOFRAN) injection 4 mg, 4 mg, IntraVENous, Q6H PRN  LORazepam (ATIVAN) injection 1 mg, 1 mg, IntraVENous, Q4H PRN  labetalol (NORMODYNE;TRANDATE) injection 10 mg, 10 mg, IntraVENous, Q10 Min PRN  glucose chewable tablet 16 g, 4 tablet, Oral, PRN  dextrose bolus 10% 125 mL, 125 mL, IntraVENous, PRN **OR** dextrose bolus 10% 250 mL, 250 mL, IntraVENous, PRN  glucagon injection 1 mg, 1 mg, SubCUTAneous, PRN  dextrose 10 % infusion, , IntraVENous, Continuous PRN  cefTRIAXone (ROCEPHIN) 1,000 mg in sterile water 10 mL IV syringe, 1,000 mg, IntraVENous, Q24H         Technical Summary:  The EEG was recorded in a digital format on a patient who is reported to be awake and drowsy state during the recording. The patient was not sleep deprived prior to the EEG.    The recording revealed a normal background rhythm in the alpha frequency range that was

## 2024-06-20 NOTE — PROGRESS NOTES
Urology Attending Progress Note      Subjective: Sitting up eating breakfast, wanting to go home as soon as possible, tolerating tavarez ok    Vitals:  BP (!) 147/61   Pulse 67   Temp 97.6 °F (36.4 °C) (Oral)   Resp 18   Ht 1.664 m (5' 5.5\")   Wt 46.1 kg (101 lb 10.1 oz)   SpO2 95%   BMI 16.66 kg/m²   Temp  Av.5 °F (36.4 °C)  Min: 97.1 °F (36.2 °C)  Max: 98 °F (36.7 °C)    Intake/Output Summary (Last 24 hours) at 2024 1013  Last data filed at 2024 0748  Gross per 24 hour   Intake 1360 ml   Output 1425 ml   Net -65 ml       Exam:  Awake, alert, agitated  Intermittent confusion, asking me to take out her bladder  Abdomen soft, nontender    Labs:  WBC:    Lab Results   Component Value Date/Time    WBC 4.9 2024 07:06 AM     Hemoglobin/Hematocrit:    Lab Results   Component Value Date/Time    HGB 8.8 2024 07:06 AM    HCT 26.8 2024 07:06 AM     BMP:    Lab Results   Component Value Date/Time     2024 07:06 AM    K 4.2 2024 07:06 AM    K 4.3 2024 07:45 AM     2024 07:06 AM    CO2 24 2024 07:06 AM    BUN 14 2024 07:06 AM    CREATININE 0.7 2024 07:06 AM    CALCIUM 8.5 2024 07:06 AM    GFRAA >60 2022 07:58 AM    GFRAA >60 2012 09:01 AM    LABGLOM 85 2024 07:06 AM    LABGLOM >60 11/15/2023 08:41 AM     PT/INR:    Lab Results   Component Value Date/Time    PROTIME 13.5 2024 07:45 AM    INR 1.01 2024 07:45 AM     PTT:  No results found for: \"APTT\"[APTT      Urine Culture:        Component    Organism Gram negative nawaf Abnormal    Urine Culture, Routine <10,000 CFU/ml  No further workup            Antibiotic Therapy:  Rocephin    Imaging:   Ct abdomen pelvis   24  IMPRESSION:  No acute traumatic solid organ injury identified given the reported history  of trauma.     Mild fat stranding surrounds the bladder.  Recommend correlation withurinalysis to exclude cystitis.      Impression/Plan:

## 2024-06-20 NOTE — PROGRESS NOTES
V2.0    [facility]      Name:  Herman Mccarthy /Age/Sex: 1940  (83 y.o. female)   MRN & CSN:  [id number] & [id number] Encounter Date/Time: 2024 7:27 AM EDT   Location:  4318/4318-01 PCP: Enoch Varner MD     Attending:Quincy Barrera MD       Hospital Day: 3    Assessment and Recommendations   Herman Mccarthy is a 83 y.o. female with pmh of HTN, severe protein calorie malnutrition, and DMII  who presents with fall and SAH.     Pt was found on the floor by her  who woke up to the sound of her falling. She was conscious and not confused.  Pt does not remember what happened. She remember going to bed last night feeling ok, then remembers waking up to her . She denies any fever, chills, cough, chest pain, headache before all of this or hx of seizures. She has been having dyuria for the last 2-3 weeks with some lower back pain. She has a tongue bite on exam. She has multiple bruises on her Rt side (face and body). She is not sure if she lost control of her bowel or bladder.     CT head showed Small focus of right frontal subarachnoid hemorrhage   CT abd/pelvix was ok (possible cystitis) (done because she was supposed to do it as outpt due to her wt loss). Other xrays were ok . EKG was ok     BP was high so she was started on Cardene drip . She was given IV keppra      She has been having significant wt loss over the last 6 months. Her Hb was 6.8 recently with low iron studies,  and she had EGD and colonoscopy was done, noted Haital hernia and severe diverticulosis and colitis but not acute bleeding noted . Pathology from ileocecal valve showed mild chronic active colitis with evidence of ulceration . Path from colon showed melanosis coli. Hb today is 10.7. She tells me that she has good appetite but the nurses who come to her house does not give her enough food due to her uncontrolled diabetes . She thinks she lost more than 70 lbs     SAH  -Repeat CT and MRI are stable. MRI  generalized parenchymal volume loss. Remote lacunar infarct involving the right body of the caudate nucleus. ORBITS: The visualized portion of the orbits demonstrate no acute abnormality. SINUSES: The visualized paranasal sinuses and mastoid air cells demonstrate no acute abnormality. SOFT TISSUES/SKULL:  Right frontal scalp laceration/contusion.  No acute calvarial fracture. CERVICAL SPINE: BONES/ALIGNMENT: There is no acute fracture or traumatic malalignment. DEGENERATIVE CHANGES: No significant degenerative changes. SOFT TISSUES: The prevertebral soft tissues are unremarkable.     1. Small focus of right frontal subarachnoid hemorrhage. 2. Right frontal scalp laceration/contusion. No acute calvarial fracture. 3. No acute fracture or traumatic malalignment of the cervical spine.     XR ELBOW RIGHT (2 VIEWS)    Result Date: 6/18/2024  EXAMINATION: 2 XRAY VIEWS OF THE RIGHT ELBOW 6/18/2024 7:37 am COMPARISON: None. HISTORY: ORDERING SYSTEM PROVIDED HISTORY: trauma TECHNOLOGIST PROVIDED HISTORY: Reason for exam:->trauma Reason for Exam: trauma FINDINGS: There is mild prominence of the anterior fat pad of the elbow.  However, no acute fracture or malalignment noted.  No significant degenerative change     1. No acute fracture or malalignment. 2. Mild prominence of the anterior fat pad of the elbow. This can be seen in the setting of occult fracture..  Consider clinical-radiographic follow-up     XR CHEST PORTABLE    Result Date: 6/18/2024  EXAMINATION: ONE XRAY VIEW OF THE CHEST 6/18/2024 7:37 am COMPARISON: None. HISTORY: ORDERING SYSTEM PROVIDED HISTORY: trauma TECHNOLOGIST PROVIDED HISTORY: Reason for exam:->trauma Reason for Exam: trauma FINDINGS: Heart size is normal.  Mediastinal contours are normal.  Pulmonary vascularity is normal.  No focal lung consolidation noted     No acute abnormality.       CBC:   Recent Labs     06/18/24  1906 06/19/24  0637 06/20/24  0706   WBC 7.3 5.5 4.9   HGB 9.9* 9.1* 8.8*   PLT

## 2024-06-20 NOTE — DISCHARGE INSTRUCTIONS
Decrease to 8 units I the morning and 4 units at bed time   Decrease lung and dinner novolog to 3 units

## 2024-06-21 ENCOUNTER — CARE COORDINATION (OUTPATIENT)
Dept: CASE MANAGEMENT | Age: 84
End: 2024-06-21

## 2024-06-21 NOTE — CARE COORDINATION
Care Transitions Note    Initial Call - Call within 2 business days of discharge: Yes    Attempted to reach patient for transitions of care follow up. Unable to reach patient.    Outreach Attempts:   CTN spoke with pt and spouse, Pa, who states he broke tubing at bottom of urine / tavarez bag and wanted to know if there was a replacement piece.   Pt and spouse confirm tavarez bag continues to catch urine and that they describe as \"light yellow\".  CTN provided education on tavarez care and referred pt to call and speak with Urology group regarding tube issue.  Pt denies any any issues including seizure activity.  No falls since returning home.  CTN provided contact number to spouse who read number back and stated they will call.  Pt agreeable to call back on Monday.     Patient: Herman Mccarthy Patient : 1940   MRN: [id number]   Reason for Admission: seizure - subarachnoid hemorrhage   Discharge Date: 24  RURS: Readmission Risk Score: 15.5    Last Discharge Facility       Date Complaint Diagnosis Description Type Department Provider    24  Syncope, unspecified syncope type Admission (Discharged) [id number] Quincy Barrera MD    24 Fall Traumatic subarachnoid hemorrhage with unknown loss of consciousness status, initial encounter ([facility]) ... ED (TRANSFER) [facility] ED Manny, Jeff Cruz MD            Was this an external facility discharge? No    Follow Up Appointment:   Patient does not have a follow up appointment scheduled at time of call.  Msg to pcp pool   Future Appointments         Provider Specialty Dept Phone    7/15/2024 10:00 AM Yane Plummer, APRN - CNP Cardiology [phone]    2024 10:00 AM Enoch Varner MD Internal Medicine [phone]    10/9/2024 10:20 AM Sangeetha Villegas MD Endocrinology [phone]            Plan for follow-up on next business day.      Gita Kim RN

## 2024-06-24 ENCOUNTER — CARE COORDINATION (OUTPATIENT)
Dept: CASE MANAGEMENT | Age: 84
End: 2024-06-24

## 2024-06-24 ENCOUNTER — HOSPITAL ENCOUNTER (EMERGENCY)
Age: 84
Discharge: HOME | End: 2024-06-24
Payer: MEDICARE

## 2024-06-24 VITALS
BODY MASS INDEX: 16.4 KG/M2 | RESPIRATION RATE: 14 BRPM | SYSTOLIC BLOOD PRESSURE: 187 MMHG | DIASTOLIC BLOOD PRESSURE: 71 MMHG | WEIGHT: 100.1 LBS | TEMPERATURE: 97.6 F | OXYGEN SATURATION: 94 % | HEART RATE: 79 BPM

## 2024-06-24 DIAGNOSIS — Z51.89 VISIT FOR WOUND CHECK: Primary | ICD-10-CM

## 2024-06-24 DIAGNOSIS — Z97.8 FOLEY CATHETER IN PLACE: ICD-10-CM

## 2024-06-24 DIAGNOSIS — Z48.02 VISIT FOR SUTURE REMOVAL: ICD-10-CM

## 2024-06-24 PROCEDURE — 99282 EMERGENCY DEPT VISIT SF MDM: CPT

## 2024-06-24 ASSESSMENT — ENCOUNTER SYMPTOMS
SHORTNESS OF BREATH: 0
VOMITING: 0
EYE REDNESS: 0
RHINORRHEA: 0
EYE ITCHING: 0
STRIDOR: 0
EYE DISCHARGE: 0
BACK PAIN: 0
SORE THROAT: 0
ABDOMINAL PAIN: 0
COUGH: 0
NAUSEA: 0
DIARRHEA: 0

## 2024-06-24 ASSESSMENT — LIFESTYLE VARIABLES
HOW OFTEN DO YOU HAVE A DRINK CONTAINING ALCOHOL: NEVER
HOW MANY STANDARD DRINKS CONTAINING ALCOHOL DO YOU HAVE ON A TYPICAL DAY: PATIENT DOES NOT DRINK

## 2024-06-24 ASSESSMENT — PAIN - FUNCTIONAL ASSESSMENT: PAIN_FUNCTIONAL_ASSESSMENT: NONE - DENIES PAIN

## 2024-06-24 NOTE — CARE COORDINATION
Scheduled call not placed to pt.  Pt currently in [facility] ER d/t wound care.  CTN will continue to follow for disposition.    Future Appointments   Date Time Provider Department Center   7/15/2024 10:00 AM Yane Plummer APRN - CNP [facility] Cardio [facility]   8/13/2024 10:00 AM Enoch Varner MD [facility]   10/9/2024 10:20 AM Sangeetha Villegas MD [facility] ENDO [facility]     Plan for f/u call in next day based on severity of symptoms and risk factors.    Plan for next call:   symptom management  self-management  follow up appointment  medication management    THANH Wiley, RN  Care Transition Coordinator  Contact Number:  [phone]

## 2024-06-24 NOTE — ED PROVIDER NOTES
paranasal sinuses and mastoid air cells demonstrate no acute abnormality. SOFT TISSUES/SKULL:  Right frontal scalp laceration/contusion.  No acute calvarial fracture. CERVICAL SPINE: BONES/ALIGNMENT: There is no acute fracture or traumatic malalignment. DEGENERATIVE CHANGES: No significant degenerative changes. SOFT TISSUES: The prevertebral soft tissues are unremarkable.     1. Small focus of right frontal subarachnoid hemorrhage. 2. Right frontal scalp laceration/contusion. No acute calvarial fracture. 3. No acute fracture or traumatic malalignment of the cervical spine.     CT CERVICAL SPINE WO CONTRAST    Result Date: 6/18/2024  EXAMINATION: CT OF THE HEAD WITHOUT CONTRAST; CT OF THE CERVICAL SPINE WITHOUT CONTRAST 6/18/2024 8:46 am TECHNIQUE: CT of the head was performed without the administration of intravenous contrast. Automated exposure control, iterative reconstruction, and/or weight based adjustment of the mA/kV was utilized to reduce the radiation dose to as low as reasonably achievable.; CT of the cervical spine was performed without the administration of intravenous contrast. Multiplanar reformatted images are provided for review. Automated exposure control, iterative reconstruction, and/or weight based adjustment of the mA/kV was utilized to reduce the radiation dose to as low as reasonably achievable. COMPARISON: None. HISTORY: ORDERING SYSTEM PROVIDED HISTORY: trauma FINDINGS: BRAIN/VENTRICLES: Small focus of right frontal subarachnoid hemorrhage. There is no intracranial mass effect or midline shift.  The gray-white differentiation is maintained without evidence of an acute infarct.  There is no evidence of hydrocephalus.  Mild generalized parenchymal volume loss. Remote lacunar infarct involving the right body of the caudate nucleus. ORBITS: The visualized portion of the orbits demonstrate no acute abnormality. SINUSES: The visualized paranasal sinuses and mastoid air cells demonstrate no acute

## 2024-06-25 ENCOUNTER — CARE COORDINATION (OUTPATIENT)
Dept: CASE MANAGEMENT | Age: 84
End: 2024-06-25

## 2024-06-25 NOTE — CARE COORDINATION
Care Transitions Note    Initial Call - Call within 2 business days of discharge: Yes    Attempted to reach patient for transitions of care follow up. Unable to reach patient.    Outreach Attempts:   Unable to leave message.     Patient: Herman Mccarthy Patient : 1940   MRN: [id number]   Reason for Admission: wound ck / subarachnoid hematoma / tavarez   Discharge Date: 24  RURS: Readmission Risk Score: 15.5    Last Discharge Facility       Date Complaint Diagnosis Description Type Department Provider    24 Post-op Problem Visit for wound check ... ED (DISCHARGE) [facility] ED             Was this an external facility discharge? No    Follow Up Appointment:   Patient does not have a follow up appointment scheduled at time of call.  Msg pcp pool - change  appt type to hfu??  Future Appointments         Provider Specialty Dept Phone    2024 11:00 AM Enoch Varner MD Internal Medicine [phone]    7/15/2024 10:00 AM Yane Plummer, APRN - [facility] Cardiology [phone]    2024 10:00 AM Enoch Varner MD Internal Medicine [phone]    10/9/2024 10:20 AM Sangeetha Villegas MD Endocrinology [phone]            Plan for follow-up on next business day.      Gita Kim RN

## 2024-06-26 ENCOUNTER — CARE COORDINATION (OUTPATIENT)
Dept: CASE MANAGEMENT | Age: 84
End: 2024-06-26

## 2024-06-26 NOTE — CARE COORDINATION
[facility] Transitions Initial Follow Up Call    Call within 2 business days of discharge: Yes    Patient:  ADELINE HART  Patient :  1940  MRN:  [id number]   Reason for Admission:  subarachnoid hemmorrhage   Discharge Date:  24  RARS: 16      Transitions of Care Initial Call    Was this an external facility discharge? no    Discharge Facility: [facility]      Challenges to be reviewed by the provider   Additional needs identified to be addressed with provider:    no    AMB CC Provider Discharge Needs: none            Non-face-to-face services provided:    CTC attempt to reach Pt regarding recent hospital discharge /  unable to reach / CT episode closed / CTN signed off.         Follow up appointments:    Future Appointments   Date Time Provider Department Center   2024 11:00 AM Enoch Varner MD [facility]   7/15/2024 10:00 AM Yane Plummer APRN - CNP  Cardio [facility]   2024 10:00 AM Enoch Varner MD [facility]   10/9/2024 10:20 AM Sangeetha Villegas MD FF ENDO [facility]       THANH Wiley, RN  Care Transition Coordinator  Contact Number:  [phone]

## 2024-07-01 PROBLEM — D69.6 THROMBOCYTOPENIA, UNSPECIFIED (HCC): Status: ACTIVE | Noted: 2024-07-01

## 2024-07-08 ENCOUNTER — TELEPHONE (OUTPATIENT)
Dept: ENDOCRINOLOGY | Age: 84
End: 2024-07-08

## 2024-07-08 DIAGNOSIS — E10.65 UNCONTROLLED TYPE 1 DIABETES MELLITUS WITH HYPERGLYCEMIA (HCC): ICD-10-CM

## 2024-07-08 RX ORDER — INSULIN LISPRO 100 [IU]/ML
INJECTION, SOLUTION INTRAVENOUS; SUBCUTANEOUS
Qty: 15 ML | Refills: 3 | Status: SHIPPED | OUTPATIENT
Start: 2024-07-08

## 2024-07-08 RX ORDER — INSULIN GLARGINE 100 [IU]/ML
INJECTION, SOLUTION SUBCUTANEOUS
Qty: 15 ML | Refills: 3 | Status: SHIPPED | OUTPATIENT
Start: 2024-07-08

## 2024-07-08 NOTE — TELEPHONE ENCOUNTER
Pt's  calling and states pt rec'd a letter in mail a couple months ago from insurance stating they will no longer cover Novolog and pt was provided w/ a temporary supply    So he is asking for a refill for 90 days on the cov'd alternative to Novolog and also a refill on Basaglar for 90 days    Pharmacy info - [facility] [address]# [phone]

## 2024-07-09 NOTE — TELEPHONE ENCOUNTER
Pts  calling and states he picked up rx's at pharmacy and on the Admelog box it says do not mix w/ other insulins. He said he asked the pharmacist and they told him to call his Dr's office to check. So he is asking if its ok to take Basaglar w/ the Admelog?      Pa Mccrathy (Spouse)  [phone] (Home Phone)

## 2024-07-10 ENCOUNTER — TELEPHONE (OUTPATIENT)
Dept: ENDOCRINOLOGY | Age: 84
End: 2024-07-10

## 2024-07-10 NOTE — TELEPHONE ENCOUNTER
OK to take the two insulins at the same time, but always make sure which is which to avoid dose errors.

## 2024-08-07 ENCOUNTER — TELEPHONE (OUTPATIENT)
Dept: ENDOCRINOLOGY | Age: 84
End: 2024-08-07

## 2024-09-04 ENCOUNTER — TELEPHONE (OUTPATIENT)
Dept: ENDOCRINOLOGY | Age: 84
End: 2024-09-04

## 2024-09-23 ENCOUNTER — HOSPITAL ENCOUNTER (OUTPATIENT)
Age: 84
Discharge: HOME | End: 2024-09-23
Payer: MEDICARE

## 2024-09-23 ENCOUNTER — TELEPHONE (OUTPATIENT)
Dept: ENDOCRINOLOGY | Age: 84
End: 2024-09-23

## 2024-09-23 DIAGNOSIS — D50.8 OTHER IRON DEFICIENCY ANEMIA: ICD-10-CM

## 2024-09-23 DIAGNOSIS — E10.65 UNCONTROLLED TYPE 1 DIABETES MELLITUS WITH HYPERGLYCEMIA (HCC): ICD-10-CM

## 2024-09-23 LAB
DEPRECATED RDW RBC AUTO: 14.3 % (ref 12.4–15.4)
HCT VFR BLD AUTO: 35.8 % (ref 36–48)
HGB BLD-MCNC: 11.9 G/DL (ref 12–16)
MCH RBC QN AUTO: 28.5 PG (ref 26–34)
MCHC RBC AUTO-ENTMCNC: 33.1 G/DL (ref 31–36)
MCV RBC AUTO: 86.2 FL (ref 80–100)
PLATELET # BLD AUTO: 157 K/UL (ref 135–450)
PMV BLD AUTO: 9.8 FL (ref 5–10.5)
RBC # BLD AUTO: 4.15 M/UL (ref 4–5.2)
WBC # BLD AUTO: 4.7 K/UL (ref 4–11)

## 2024-09-23 PROCEDURE — 36415 COLL VENOUS BLD VENIPUNCTURE: CPT

## 2024-09-23 PROCEDURE — 85027 COMPLETE CBC AUTOMATED: CPT

## 2024-09-23 RX ORDER — METFORMIN HYDROCHLORIDE 500 MG/5ML
SOLUTION ORAL
Qty: 1350 ML | Refills: 3 | Status: SHIPPED | OUTPATIENT
Start: 2024-09-23

## 2024-09-23 RX ORDER — LANCETS 33 GAUGE
EACH MISCELLANEOUS
Qty: 300 EACH | Refills: 5 | Status: SHIPPED | OUTPATIENT
Start: 2024-09-23

## 2024-10-02 ENCOUNTER — TELEPHONE (OUTPATIENT)
Dept: ENDOCRINOLOGY | Age: 84
End: 2024-10-02

## 2024-10-09 ENCOUNTER — OFFICE VISIT (OUTPATIENT)
Dept: ENDOCRINOLOGY | Age: 84
End: 2024-10-09
Payer: MEDICARE

## 2024-10-09 VITALS
BODY MASS INDEX: 16.26 KG/M2 | RESPIRATION RATE: 14 BRPM | HEART RATE: 70 BPM | SYSTOLIC BLOOD PRESSURE: 200 MMHG | WEIGHT: 101.2 LBS | TEMPERATURE: 98 F | DIASTOLIC BLOOD PRESSURE: 92 MMHG | HEIGHT: 66 IN

## 2024-10-09 DIAGNOSIS — I10 ESSENTIAL HYPERTENSION: ICD-10-CM

## 2024-10-09 DIAGNOSIS — E10.65 UNCONTROLLED TYPE 1 DIABETES MELLITUS WITH HYPERGLYCEMIA (HCC): ICD-10-CM

## 2024-10-09 DIAGNOSIS — N18.31 STAGE 3A CHRONIC KIDNEY DISEASE (HCC): ICD-10-CM

## 2024-10-09 DIAGNOSIS — E10.65 UNCONTROLLED TYPE 1 DIABETES MELLITUS WITH HYPERGLYCEMIA (HCC): Primary | ICD-10-CM

## 2024-10-09 LAB
ALBUMIN SERPL-MCNC: 4.5 G/DL (ref 3.4–5)
ALBUMIN/GLOB SERPL: 1.9 {RATIO} (ref 1.1–2.2)
ALP SERPL-CCNC: 85 U/L (ref 40–129)
ALT SERPL-CCNC: 10 U/L (ref 10–40)
ANION GAP SERPL CALCULATED.3IONS-SCNC: 13 MMOL/L (ref 3–16)
AST SERPL-CCNC: 16 U/L (ref 15–37)
BILIRUB SERPL-MCNC: 0.7 MG/DL (ref 0–1)
BUN SERPL-MCNC: 17 MG/DL (ref 7–20)
CALCIUM SERPL-MCNC: 10 MG/DL (ref 8.3–10.6)
CHLORIDE SERPL-SCNC: 102 MMOL/L (ref 99–110)
CO2 SERPL-SCNC: 26 MMOL/L (ref 21–32)
CREAT SERPL-MCNC: 1 MG/DL (ref 0.6–1.2)
GFR SERPLBLD CREATININE-BSD FMLA CKD-EPI: 55 ML/MIN/{1.73_M2}
GLUCOSE SERPL-MCNC: 110 MG/DL (ref 70–99)
HBA1C MFR BLD: 6.6 %
POTASSIUM SERPL-SCNC: 4.6 MMOL/L (ref 3.5–5.1)
PROT SERPL-MCNC: 6.9 G/DL (ref 6.4–8.2)
SODIUM SERPL-SCNC: 141 MMOL/L (ref 136–145)
TSH SERPL DL<=0.005 MIU/L-ACNC: 1.24 UIU/ML (ref 0.27–4.2)

## 2024-10-09 PROCEDURE — 3077F SYST BP >= 140 MM HG: CPT | Performed by: INTERNAL MEDICINE

## 2024-10-09 PROCEDURE — 83036 HEMOGLOBIN GLYCOSYLATED A1C: CPT | Performed by: INTERNAL MEDICINE

## 2024-10-09 PROCEDURE — G2211 COMPLEX E/M VISIT ADD ON: HCPCS | Performed by: INTERNAL MEDICINE

## 2024-10-09 PROCEDURE — 99214 OFFICE O/P EST MOD 30 MIN: CPT | Performed by: INTERNAL MEDICINE

## 2024-10-09 PROCEDURE — 1123F ACP DISCUSS/DSCN MKR DOCD: CPT | Performed by: INTERNAL MEDICINE

## 2024-10-09 PROCEDURE — 3079F DIAST BP 80-89 MM HG: CPT | Performed by: INTERNAL MEDICINE

## 2024-10-09 PROCEDURE — 3044F HG A1C LEVEL LT 7.0%: CPT | Performed by: INTERNAL MEDICINE

## 2024-10-09 NOTE — PATIENT INSTRUCTIONS
instructions adapted under license by [employer]. If you have questions about a medical condition or this instruction, always ask your healthcare professional. [employer] disclaims any warranty or liability for your use of this information.

## 2024-10-09 NOTE — PROGRESS NOTES
MedicAlert identification noted. The initial diagnosis of diabetes was made 7 months ago. Her disease course has been improving. There are no hypoglycemic associated symptoms. Associated symptoms include polyphagia and polyuria. Pertinent negatives for diabetes include no weight loss. There are no hypoglycemic complications. Symptoms are improving. There are no diabetic complications. Risk factors for coronary artery disease include hypertension. Current diabetic treatment includes insulin injections. She is compliant with treatment most of the time. Her weight is stable. She is following a generally healthy diet. Meal planning includes avoidance of concentrated sweets. She has not had a previous visit with a dietitian. She rarely participates in exercise. Her breakfast blood glucose is taken between 7-8 am. Her breakfast blood glucose range is generally  mg/dl. Her dinner blood glucose range is generally 140-180 mg/dl. An ACE inhibitor/angiotensin II receptor blocker is being taken. She sees a podiatrist.Eye exam is current.        Patient was evaluated in June 2024 in the hospital after she had a fall and had intracranial hemorrhage  Weight is stable   Takes Basaglar  8 units a day   Denies any hypoglycemia  Having bladder issues and follows with OB/GYN        Past Medical History:   Diagnosis Date    Diabetes mellitus (HCC)     Hypertension       Patient Active Problem List   Diagnosis    Essential hypertension    Constipation    Primary osteoarthritis of both knees    Conductive hearing loss, bilateral    Severe protein-calorie malnutrition (HCC)    Diabetes mellitus type 1.5, managed as type 2 (HCC)    Diabetes mellitus type 2 with complications (HCC)    Proteinuria    Urge incontinence of urine    Anemia    Microcytic hypochromic anemia    Cachexia (HCC)    Type 1 diabetes mellitus with hyperglycemia (HCC)    Hypertensive urgency    Subarachnoid hemorrhage (HCC)    Seizure with provoking factor (HCC)

## 2024-10-11 LAB — C PEPTIDE SERPL-MCNC: 0.5 NG/ML (ref 1.1–4.4)

## 2024-10-15 LAB — GAD65 AB SER IA-ACNC: 27.4 IU/ML (ref 0–5)

## 2024-11-06 ENCOUNTER — TELEPHONE (OUTPATIENT)
Dept: ENDOCRINOLOGY | Age: 84
End: 2024-11-06

## 2024-12-03 DIAGNOSIS — E10.65 UNCONTROLLED TYPE 1 DIABETES MELLITUS WITH HYPERGLYCEMIA (HCC): ICD-10-CM

## 2024-12-03 RX ORDER — LANCETS 33 GAUGE
EACH MISCELLANEOUS
Qty: 300 EACH | Refills: 5 | Status: SHIPPED | OUTPATIENT
Start: 2024-12-03

## 2024-12-03 RX ORDER — BLOOD SUGAR DIAGNOSTIC
STRIP MISCELLANEOUS
Qty: 300 STRIP | Refills: 5 | Status: SHIPPED | OUTPATIENT
Start: 2024-12-03

## 2024-12-03 NOTE — TELEPHONE ENCOUNTER
Patients  called in requesting for the test strips and lancets to be sent to the [facility] pharmacy on [address].     Prescriptions sent.

## 2024-12-11 ENCOUNTER — TELEPHONE (OUTPATIENT)
Dept: ENDOCRINOLOGY | Age: 84
End: 2024-12-11

## 2024-12-18 ENCOUNTER — TELEPHONE (OUTPATIENT)
Dept: ENDOCRINOLOGY | Age: 84
End: 2024-12-18

## 2024-12-18 DIAGNOSIS — E10.65 UNCONTROLLED TYPE 1 DIABETES MELLITUS WITH HYPERGLYCEMIA (HCC): ICD-10-CM

## 2024-12-18 RX ORDER — METFORMIN HYDROCHLORIDE 500 MG/5ML
SOLUTION ORAL
Qty: 1350 ML | Refills: 3 | Status: SHIPPED | OUTPATIENT
Start: 2024-12-18

## 2025-01-06 DIAGNOSIS — E10.65 UNCONTROLLED TYPE 1 DIABETES MELLITUS WITH HYPERGLYCEMIA (HCC): ICD-10-CM

## 2025-01-07 RX ORDER — PEN NEEDLE, DIABETIC 32GX 5/32"
NEEDLE, DISPOSABLE MISCELLANEOUS
Qty: 150 EACH | Refills: 3 | Status: SHIPPED | OUTPATIENT
Start: 2025-01-07

## 2025-01-09 ENCOUNTER — HOSPITAL ENCOUNTER (INPATIENT)
Age: 85
LOS: 1 days | Discharge: HOME | DRG: 071 | End: 2025-01-10
Attending: EMERGENCY MEDICINE | Admitting: INTERNAL MEDICINE
Payer: MEDICARE

## 2025-01-09 ENCOUNTER — APPOINTMENT (OUTPATIENT)
Dept: MRI IMAGING | Age: 85
DRG: 071 | End: 2025-01-09
Payer: MEDICARE

## 2025-01-09 ENCOUNTER — APPOINTMENT (OUTPATIENT)
Dept: GENERAL RADIOLOGY | Age: 85
DRG: 071 | End: 2025-01-09
Payer: MEDICARE

## 2025-01-09 ENCOUNTER — APPOINTMENT (OUTPATIENT)
Dept: CT IMAGING | Age: 85
DRG: 071 | End: 2025-01-09
Payer: MEDICARE

## 2025-01-09 ENCOUNTER — TELEPHONE (OUTPATIENT)
Dept: ENDOCRINOLOGY | Age: 85
End: 2025-01-09

## 2025-01-09 DIAGNOSIS — E88.09 HYPOALBUMINEMIA: ICD-10-CM

## 2025-01-09 DIAGNOSIS — E77.8 HYPOPROTEINEMIA (HCC): ICD-10-CM

## 2025-01-09 DIAGNOSIS — E87.6 HYPOKALEMIA: ICD-10-CM

## 2025-01-09 DIAGNOSIS — I21.4 NSTEMI (NON-ST ELEVATED MYOCARDIAL INFARCTION) (HCC): ICD-10-CM

## 2025-01-09 DIAGNOSIS — E83.51 HYPOCALCEMIA: ICD-10-CM

## 2025-01-09 DIAGNOSIS — G93.40 ACUTE ENCEPHALOPATHY: Primary | ICD-10-CM

## 2025-01-09 PROBLEM — E46 MALNUTRITION (HCC): Status: ACTIVE | Noted: 2025-01-09

## 2025-01-09 LAB
ALBUMIN SERPL-MCNC: 2.9 G/DL (ref 3.4–5)
ALBUMIN/GLOB SERPL: 1.3 {RATIO} (ref 1.1–2.2)
ALP SERPL-CCNC: 83 U/L (ref 40–129)
ALT SERPL-CCNC: <5 U/L (ref 10–40)
ANION GAP SERPL CALCULATED.3IONS-SCNC: 12 MMOL/L (ref 3–16)
APTT BLD: 23.6 SEC (ref 22.1–36.4)
AST SERPL-CCNC: 10 U/L (ref 15–37)
BACTERIA URNS QL MICRO: ABNORMAL /HPF
BASOPHILS # BLD: 0 K/UL (ref 0–0.2)
BASOPHILS NFR BLD: 0.3 %
BILIRUB SERPL-MCNC: 0.5 MG/DL (ref 0–1)
BILIRUB UR QL STRIP.AUTO: NEGATIVE
BUN SERPL-MCNC: 12 MG/DL (ref 7–20)
CALCIUM SERPL-MCNC: 7.2 MG/DL (ref 8.3–10.6)
CHLORIDE SERPL-SCNC: 107 MMOL/L (ref 99–110)
CLARITY UR: CLEAR
CO2 SERPL-SCNC: 18 MMOL/L (ref 21–32)
COLOR UR: YELLOW
CREAT SERPL-MCNC: 0.7 MG/DL (ref 0.6–1.2)
DEPRECATED RDW RBC AUTO: 13.7 % (ref 12.4–15.4)
EKG ATRIAL RATE: 76 BPM
EKG ATRIAL RATE: 96 BPM
EKG DIAGNOSIS: NORMAL
EKG DIAGNOSIS: NORMAL
EKG P AXIS: 77 DEGREES
EKG P AXIS: 79 DEGREES
EKG P-R INTERVAL: 152 MS
EKG P-R INTERVAL: 160 MS
EKG Q-T INTERVAL: 374 MS
EKG Q-T INTERVAL: 394 MS
EKG QRS DURATION: 74 MS
EKG QRS DURATION: 74 MS
EKG QTC CALCULATION (BAZETT): 443 MS
EKG QTC CALCULATION (BAZETT): 472 MS
EKG R AXIS: 19 DEGREES
EKG R AXIS: 9 DEGREES
EKG T AXIS: 87 DEGREES
EKG T AXIS: 90 DEGREES
EKG VENTRICULAR RATE: 76 BPM
EKG VENTRICULAR RATE: 96 BPM
EOSINOPHIL # BLD: 0 K/UL (ref 0–0.6)
EOSINOPHIL NFR BLD: 0.3 %
EPI CELLS #/AREA URNS AUTO: 5 /HPF (ref 0–5)
GFR SERPLBLD CREATININE-BSD FMLA CKD-EPI: 85 ML/MIN/{1.73_M2}
GLUCOSE BLD-MCNC: 166 MG/DL (ref 70–99)
GLUCOSE BLD-MCNC: 197 MG/DL (ref 70–99)
GLUCOSE SERPL-MCNC: 106 MG/DL (ref 70–99)
GLUCOSE UR STRIP.AUTO-MCNC: NEGATIVE MG/DL
HCT VFR BLD AUTO: 40.1 % (ref 36–48)
HGB BLD-MCNC: 13.6 G/DL (ref 12–16)
HGB UR QL STRIP.AUTO: ABNORMAL
HYALINE CASTS #/AREA URNS AUTO: 1 /LPF (ref 0–8)
INR PPP: 0.96 (ref 0.85–1.15)
KETONES UR STRIP.AUTO-MCNC: NEGATIVE MG/DL
LEUKOCYTE ESTERASE UR QL STRIP.AUTO: ABNORMAL
LYMPHOCYTES # BLD: 1.7 K/UL (ref 1–5.1)
LYMPHOCYTES NFR BLD: 26.3 %
MCH RBC QN AUTO: 29.1 PG (ref 26–34)
MCHC RBC AUTO-ENTMCNC: 34 G/DL (ref 31–36)
MCV RBC AUTO: 85.5 FL (ref 80–100)
MONOCYTES # BLD: 0.9 K/UL (ref 0–1.3)
MONOCYTES NFR BLD: 14.5 %
NEUTROPHILS # BLD: 3.7 K/UL (ref 1.7–7.7)
NEUTROPHILS NFR BLD: 58.6 %
NITRITE UR QL STRIP.AUTO: NEGATIVE
NT-PROBNP SERPL-MCNC: 930 PG/ML (ref 0–449)
PERFORMED ON: ABNORMAL
PERFORMED ON: ABNORMAL
PH UR STRIP.AUTO: 6.5 [PH] (ref 5–8)
PLATELET # BLD AUTO: 226 K/UL (ref 135–450)
PMV BLD AUTO: 8.2 FL (ref 5–10.5)
POTASSIUM SERPL-SCNC: 2.9 MMOL/L (ref 3.5–5.1)
PROT SERPL-MCNC: 5.1 G/DL (ref 6.4–8.2)
PROT UR STRIP.AUTO-MCNC: 100 MG/DL
PROTHROMBIN TIME: 13 SEC (ref 11.9–14.9)
RBC # BLD AUTO: 4.69 M/UL (ref 4–5.2)
RBC CLUMPS #/AREA URNS AUTO: 7 /HPF (ref 0–4)
REASON FOR REJECTION: NORMAL
REJECTED TEST: NORMAL
SODIUM SERPL-SCNC: 137 MMOL/L (ref 136–145)
SP GR UR STRIP.AUTO: 1.02 (ref 1–1.03)
TROPONIN, HIGH SENSITIVITY: 103 NG/L (ref 0–14)
TROPONIN, HIGH SENSITIVITY: 134 NG/L (ref 0–14)
TROPONIN, HIGH SENSITIVITY: 162 NG/L (ref 0–14)
TROPONIN, HIGH SENSITIVITY: 33 NG/L (ref 0–14)
UA COMPLETE W REFLEX CULTURE PNL UR: ABNORMAL
UA DIPSTICK W REFLEX MICRO PNL UR: YES
URN SPEC COLLECT METH UR: ABNORMAL
UROBILINOGEN UR STRIP-ACNC: 1 E.U./DL
WBC # BLD AUTO: 6.3 K/UL (ref 4–11)
WBC #/AREA URNS AUTO: 4 /HPF (ref 0–5)

## 2025-01-09 PROCEDURE — 85025 COMPLETE CBC W/AUTO DIFF WBC: CPT

## 2025-01-09 PROCEDURE — 6370000000 HC RX 637 (ALT 250 FOR IP): Performed by: INTERNAL MEDICINE

## 2025-01-09 PROCEDURE — 6360000002 HC RX W HCPCS: Performed by: INTERNAL MEDICINE

## 2025-01-09 PROCEDURE — 96365 THER/PROPH/DIAG IV INF INIT: CPT

## 2025-01-09 PROCEDURE — 93005 ELECTROCARDIOGRAM TRACING: CPT | Performed by: NURSE PRACTITIONER

## 2025-01-09 PROCEDURE — 71045 X-RAY EXAM CHEST 1 VIEW: CPT

## 2025-01-09 PROCEDURE — 80053 COMPREHEN METABOLIC PANEL: CPT

## 2025-01-09 PROCEDURE — 6360000002 HC RX W HCPCS: Performed by: PHYSICIAN ASSISTANT

## 2025-01-09 PROCEDURE — 70450 CT HEAD/BRAIN W/O DYE: CPT

## 2025-01-09 PROCEDURE — 93005 ELECTROCARDIOGRAM TRACING: CPT | Performed by: PHYSICIAN ASSISTANT

## 2025-01-09 PROCEDURE — 70496 CT ANGIOGRAPHY HEAD: CPT

## 2025-01-09 PROCEDURE — 4A03X5D MEASUREMENT OF ARTERIAL FLOW, INTRACRANIAL, EXTERNAL APPROACH: ICD-10-PCS | Performed by: INTERNAL MEDICINE

## 2025-01-09 PROCEDURE — 93010 ELECTROCARDIOGRAM REPORT: CPT | Performed by: INTERNAL MEDICINE

## 2025-01-09 PROCEDURE — 70551 MRI BRAIN STEM W/O DYE: CPT

## 2025-01-09 PROCEDURE — 1200000000 HC SEMI PRIVATE

## 2025-01-09 PROCEDURE — 83880 ASSAY OF NATRIURETIC PEPTIDE: CPT

## 2025-01-09 PROCEDURE — 83036 HEMOGLOBIN GLYCOSYLATED A1C: CPT

## 2025-01-09 PROCEDURE — 85610 PROTHROMBIN TIME: CPT

## 2025-01-09 PROCEDURE — 2580000003 HC RX 258: Performed by: INTERNAL MEDICINE

## 2025-01-09 PROCEDURE — 73560 X-RAY EXAM OF KNEE 1 OR 2: CPT

## 2025-01-09 PROCEDURE — 96366 THER/PROPH/DIAG IV INF ADDON: CPT

## 2025-01-09 PROCEDURE — 96372 THER/PROPH/DIAG INJ SC/IM: CPT

## 2025-01-09 PROCEDURE — 99285 EMERGENCY DEPT VISIT HI MDM: CPT

## 2025-01-09 PROCEDURE — 81001 URINALYSIS AUTO W/SCOPE: CPT

## 2025-01-09 PROCEDURE — G0378 HOSPITAL OBSERVATION PER HR: HCPCS

## 2025-01-09 PROCEDURE — 6360000004 HC RX CONTRAST MEDICATION: Performed by: PHYSICIAN ASSISTANT

## 2025-01-09 PROCEDURE — 36415 COLL VENOUS BLD VENIPUNCTURE: CPT

## 2025-01-09 PROCEDURE — 84132 ASSAY OF SERUM POTASSIUM: CPT

## 2025-01-09 PROCEDURE — 6370000000 HC RX 637 (ALT 250 FOR IP): Performed by: PHYSICIAN ASSISTANT

## 2025-01-09 PROCEDURE — 84484 ASSAY OF TROPONIN QUANT: CPT

## 2025-01-09 PROCEDURE — 96375 TX/PRO/DX INJ NEW DRUG ADDON: CPT

## 2025-01-09 PROCEDURE — 85730 THROMBOPLASTIN TIME PARTIAL: CPT

## 2025-01-09 RX ORDER — POTASSIUM CHLORIDE 7.45 MG/ML
10 INJECTION INTRAVENOUS PRN
Status: DISCONTINUED | OUTPATIENT
Start: 2025-01-09 | End: 2025-01-09

## 2025-01-09 RX ORDER — INSULIN LISPRO 100 [IU]/ML
0-8 INJECTION, SOLUTION INTRAVENOUS; SUBCUTANEOUS
Status: DISCONTINUED | OUTPATIENT
Start: 2025-01-09 | End: 2025-01-10 | Stop reason: HOSPADM

## 2025-01-09 RX ORDER — DEXTROSE MONOHYDRATE 100 MG/ML
INJECTION, SOLUTION INTRAVENOUS CONTINUOUS PRN
Status: DISCONTINUED | OUTPATIENT
Start: 2025-01-09 | End: 2025-01-10 | Stop reason: HOSPADM

## 2025-01-09 RX ORDER — POTASSIUM CHLORIDE 1500 MG/1
40 TABLET, EXTENDED RELEASE ORAL ONCE
Status: COMPLETED | OUTPATIENT
Start: 2025-01-09 | End: 2025-01-09

## 2025-01-09 RX ORDER — ONDANSETRON 2 MG/ML
4 INJECTION INTRAMUSCULAR; INTRAVENOUS EVERY 6 HOURS PRN
Status: DISCONTINUED | OUTPATIENT
Start: 2025-01-09 | End: 2025-01-10 | Stop reason: HOSPADM

## 2025-01-09 RX ORDER — HYDROMORPHONE HYDROCHLORIDE 1 MG/ML
0.25 INJECTION, SOLUTION INTRAMUSCULAR; INTRAVENOUS; SUBCUTANEOUS
Status: DISCONTINUED | OUTPATIENT
Start: 2025-01-09 | End: 2025-01-10 | Stop reason: HOSPADM

## 2025-01-09 RX ORDER — POTASSIUM CHLORIDE 1500 MG/1
40 TABLET, EXTENDED RELEASE ORAL PRN
Status: DISCONTINUED | OUTPATIENT
Start: 2025-01-09 | End: 2025-01-10 | Stop reason: HOSPADM

## 2025-01-09 RX ORDER — HYDRALAZINE HYDROCHLORIDE 20 MG/ML
10 INJECTION INTRAMUSCULAR; INTRAVENOUS EVERY 6 HOURS PRN
Status: DISCONTINUED | OUTPATIENT
Start: 2025-01-09 | End: 2025-01-10 | Stop reason: HOSPADM

## 2025-01-09 RX ORDER — POTASSIUM CHLORIDE 1500 MG/1
40 TABLET, EXTENDED RELEASE ORAL PRN
Status: DISCONTINUED | OUTPATIENT
Start: 2025-01-09 | End: 2025-01-09

## 2025-01-09 RX ORDER — SODIUM CHLORIDE 9 MG/ML
INJECTION, SOLUTION INTRAVENOUS CONTINUOUS
Status: DISCONTINUED | OUTPATIENT
Start: 2025-01-09 | End: 2025-01-10 | Stop reason: HOSPADM

## 2025-01-09 RX ORDER — POTASSIUM CHLORIDE 7.45 MG/ML
10 INJECTION INTRAVENOUS ONCE
Status: COMPLETED | OUTPATIENT
Start: 2025-01-09 | End: 2025-01-09

## 2025-01-09 RX ORDER — ACETAMINOPHEN 325 MG/1
650 TABLET ORAL EVERY 6 HOURS PRN
Status: DISCONTINUED | OUTPATIENT
Start: 2025-01-09 | End: 2025-01-10 | Stop reason: HOSPADM

## 2025-01-09 RX ORDER — 0.9 % SODIUM CHLORIDE 0.9 %
500 INTRAVENOUS SOLUTION INTRAVENOUS PRN
Status: DISCONTINUED | OUTPATIENT
Start: 2025-01-09 | End: 2025-01-10 | Stop reason: HOSPADM

## 2025-01-09 RX ORDER — ONDANSETRON 4 MG/1
4 TABLET, ORALLY DISINTEGRATING ORAL EVERY 8 HOURS PRN
Status: DISCONTINUED | OUTPATIENT
Start: 2025-01-09 | End: 2025-01-10 | Stop reason: HOSPADM

## 2025-01-09 RX ORDER — SODIUM CHLORIDE 0.9 % (FLUSH) 0.9 %
5-40 SYRINGE (ML) INJECTION PRN
Status: DISCONTINUED | OUTPATIENT
Start: 2025-01-09 | End: 2025-01-10 | Stop reason: HOSPADM

## 2025-01-09 RX ORDER — ACETAMINOPHEN 650 MG/1
650 SUPPOSITORY RECTAL EVERY 6 HOURS PRN
Status: DISCONTINUED | OUTPATIENT
Start: 2025-01-09 | End: 2025-01-10 | Stop reason: HOSPADM

## 2025-01-09 RX ORDER — POTASSIUM CHLORIDE 7.45 MG/ML
10 INJECTION INTRAVENOUS PRN
Status: DISCONTINUED | OUTPATIENT
Start: 2025-01-09 | End: 2025-01-10 | Stop reason: HOSPADM

## 2025-01-09 RX ORDER — INSULIN GLARGINE 100 [IU]/ML
7 INJECTION, SOLUTION SUBCUTANEOUS EVERY MORNING
Status: DISCONTINUED | OUTPATIENT
Start: 2025-01-10 | End: 2025-01-10 | Stop reason: HOSPADM

## 2025-01-09 RX ORDER — GLUCAGON 1 MG/ML
1 KIT INJECTION PRN
Status: DISCONTINUED | OUTPATIENT
Start: 2025-01-09 | End: 2025-01-10 | Stop reason: HOSPADM

## 2025-01-09 RX ORDER — SODIUM CHLORIDE 9 MG/ML
INJECTION, SOLUTION INTRAVENOUS PRN
Status: DISCONTINUED | OUTPATIENT
Start: 2025-01-09 | End: 2025-01-10 | Stop reason: HOSPADM

## 2025-01-09 RX ORDER — LISINOPRIL 10 MG/1
10 TABLET ORAL DAILY
Status: DISCONTINUED | OUTPATIENT
Start: 2025-01-09 | End: 2025-01-10 | Stop reason: HOSPADM

## 2025-01-09 RX ORDER — IOPAMIDOL 755 MG/ML
75 INJECTION, SOLUTION INTRAVASCULAR
Status: COMPLETED | OUTPATIENT
Start: 2025-01-09 | End: 2025-01-09

## 2025-01-09 RX ORDER — ENOXAPARIN SODIUM 100 MG/ML
30 INJECTION SUBCUTANEOUS DAILY
Status: DISCONTINUED | OUTPATIENT
Start: 2025-01-09 | End: 2025-01-10 | Stop reason: HOSPADM

## 2025-01-09 RX ORDER — SODIUM CHLORIDE 0.9 % (FLUSH) 0.9 %
5-40 SYRINGE (ML) INJECTION EVERY 12 HOURS SCHEDULED
Status: DISCONTINUED | OUTPATIENT
Start: 2025-01-09 | End: 2025-01-10 | Stop reason: HOSPADM

## 2025-01-09 RX ADMIN — POTASSIUM CHLORIDE 10 MEQ: 7.45 INJECTION INTRAVENOUS at 13:00

## 2025-01-09 RX ADMIN — HYDRALAZINE HYDROCHLORIDE 10 MG: 20 INJECTION INTRAMUSCULAR; INTRAVENOUS at 20:52

## 2025-01-09 RX ADMIN — DICLOFENAC SODIUM 4 G: 10 GEL TOPICAL at 22:34

## 2025-01-09 RX ADMIN — LISINOPRIL 10 MG: 10 TABLET ORAL at 22:33

## 2025-01-09 RX ADMIN — ENOXAPARIN SODIUM 30 MG: 100 INJECTION SUBCUTANEOUS at 22:33

## 2025-01-09 RX ADMIN — SODIUM CHLORIDE: 9 INJECTION, SOLUTION INTRAVENOUS at 20:22

## 2025-01-09 RX ADMIN — POTASSIUM CHLORIDE 40 MEQ: 1500 TABLET, EXTENDED RELEASE ORAL at 12:58

## 2025-01-09 RX ADMIN — IOPAMIDOL 75 ML: 755 INJECTION, SOLUTION INTRAVENOUS at 10:29

## 2025-01-09 ASSESSMENT — PAIN DESCRIPTION - LOCATION: LOCATION: KNEE

## 2025-01-09 ASSESSMENT — PAIN DESCRIPTION - DESCRIPTORS: DESCRIPTORS: ACHING

## 2025-01-09 ASSESSMENT — PAIN DESCRIPTION - ORIENTATION: ORIENTATION: LEFT

## 2025-01-09 ASSESSMENT — PAIN SCALES - GENERAL: PAINLEVEL_OUTOF10: 5

## 2025-01-09 NOTE — ED PROVIDER NOTES
[facility] EMERGENCY DEPARTMENT  EMERGENCY DEPARTMENT ENCOUNTER        Pt Name: Herman Mccarthy  MRN: [id number]  Birthdate 1940  Date of evaluation: 1/9/2025  Provider: Tl Lopez PA-C  PCP: Enoch Varner MD  Note Started: 10:27 AM EST 1/9/25       I have seen and evaluated this patient with my supervising physician Jeff Bryant MD.      CHIEF COMPLAINT       Chief Complaint   Patient presents with    Altered Mental Status     Per pt , pt began becoming altered and incomprehensible this around 0945 this morning.        HISTORY OF PRESENT ILLNESS: 1 or more Elements     History from : Family ()    Limitations to history : Altered Mental Status    Herman Mccarthy is a 84 y.o. female with a history of hypertension, diabetes and traumatic right frontal subarachnoid hemorrhage in June 2024 who presents to the emergency department today with her  secondary to acute onset confusion.   states that patient woke up around 8 AM this morning and was behaving normally.  He states around 9:15 AM he went out to start the car to bring the patient here for left knee x-rays and when he came back and the patient was laying on the bed and was very confused and was mumbling her words.   states that patient was complaining of right hand numbness.  Patient is alert and oriented x 2 to person and place on arrival.  She is mumbling and seems very confused.  When I asked her if she is hurting anywhere she starts to tell me about her son.  It is very difficult to get her to follow commands.  She is moving both arms and both legs without difficulty.  There is no obvious facial drooping.  She has no complaints when asked.  She is hypertensive at 149/77 and tachycardic at 129.        Nursing Notes were all reviewed and agreed with or any disagreements were addressed in the HPI.    REVIEW OF SYSTEMS :      Review of Systems   Unable to perform ROS: Mental status change  on file              (Please note that portions of this note were completed with a voice recognition program.  Efforts were made to edit the dictations but occasionally words are mis-transcribed.)    Tl Lopez PA-C (electronically signed)           Tl Lopez PA-C  01/09/25 3629

## 2025-01-09 NOTE — ED PROVIDER NOTES
I personally saw the patient and made/approved the management plan and take responsibility for the patient management.    For further details of Herman Mccarthy's emergency department encounter, please see the advanced practice provider's documentation.    I, Jeff Bryant MD, am the primary physician provider of record.    CHIEF COMPLAINT  Chief Complaint   Patient presents with    Altered Mental Status     Per pt , pt began becoming altered and incomprehensible this around 0945 this morning.        Briefly, Herman Mccarthy is a 84 y.o. female  who presents to the ED complaining of malaise fatigue and confusion, worsening this morning.  She has a history of traumatic frontal subarachnoid hemorrhage in June 2024.  She is mainly mumbling and disoriented and unable provide much meaningful history herself although when I ask her how she is doing today she says \"better than this morning\" but not able to tell me what that means    FOCUSED PHYSICAL EXAMINATION  BP (!) 184/83   Pulse 77   Temp 97.5 °F (36.4 °C) (Oral)   Resp 16   Wt 45.6 kg (100 lb 8.5 oz)   SpO2 100%   BMI 16.47 kg/m²    Focused physical examination notable for no acute distress, well-appearing, well-nourished, mumbling but minimal flavia aphasia, mild dysarthria.  Disoriented/confused,  speech and mentation without obvious facial droop, no obvious rash.  No obvious cranial nerve deficits on my initial exam otherwise.  RRR, CTAB.  Abd soft NTND.    NIH Stroke Scale  NIH Stroke Scale Assessed: Yes  Interval: Baseline  Level of Consciousness (1a): Alert  LOC Questions (1b): Answers one correctly  LOC Commands (1c): Performs both tasks correctly  Best Gaze (2): Normal  Visual (3): No visual loss  Facial Palsy (4): Normal symmetrical movement  Motor Arm, Left (5a): No drift  Motor Arm, Right (5b): No drift  Motor Leg, Left (6a): No drift  Motor Leg, Right (6b): No drift  Limb Ataxia (7): (!) Present in two limbs (Patient will not follow  suspected        Consults:  Dr. Barraza from  stroke team was consulted about the patient's ED history, physical, workup, and course so far.  Recommendations from this consultant included no TNK, after MRI reviewed there is no benefit from this medication.    Cardiology consulted by CINDI    Dr. Michaud from PCP admitting team was personally consulted about the patient's ED history, physical, workup, and course so far.  Recommendations from this consultant included he will admit.      History obtained from: Patient    Pertinent social determinants of health: None applicable    Chronic conditions potentially affecting care: None applicable    Review of other records:  None    Reassessment:  See MDM for details of medications given and reassessment    During the patient's ED course, the patient was given:  Medications   iopamidol (ISOVUE-370) 76 % injection 75 mL (75 mLs IntraVENous Given 1/9/25 1029)   potassium chloride (KLOR-CON M) extended release tablet 40 mEq (40 mEq Oral Given 1/9/25 1258)   potassium chloride 10 mEq/100 mL IVPB (Peripheral Line) (0 mEq IntraVENous Stopped 1/9/25 1447)        CLINICAL IMPRESSION  1. Acute encephalopathy    2. NSTEMI (non-ST elevated myocardial infarction) (HCC)    3. Hypokalemia    4. Hypoproteinemia (HCC)    5. Hypoalbuminemia    6. Hypocalcemia        Blood pressure (!) 184/83, pulse 77, temperature 97.5 °F (36.4 °C), temperature source Oral, resp. rate 16, weight 45.6 kg (100 lb 8.5 oz), SpO2 100%, not currently breastfeeding.    DISPOSITION  Herman Mccarthy was admitted in fair condition.    The plan is to admit to the hospital at this time under the PCP's admitting service.  Dr. Michaud accepted the patient and will take over the patient's care.    Critical care time:  The total critical care time I independently spent while evaluating and treating this patient was 45 minutes.  This excludes time spent doing separately billable procedures.  This includes time at the bedside, data  interpretation, medication management, obtaining critical history from collateral sources if the patient is unable to provide it directly, and physician consultation.  Specifics of interventions taken and potentially life-threatening diagnostic considerations are listed above in the medical decision making.  If this was a shared visit with an CINDI, the time in this attestation is non-concurrent critical care time out of the total shared critical care time provided by the CINDI and myself.    DISCLAIMER: This chart was created using Dragon dictation software.  Efforts were made by me to ensure accuracy, however some errors may be present due to limitations of this technology and occasionally words are not transcribed correctly.        Jeff Bryant MD  01/09/25 4600

## 2025-01-09 NOTE — PROGRESS NOTES
Pt admitted for acute encephalopathy    Full h+p to follow    Active Hospital Problems    Diagnosis Date Noted    Encephalopathy acute [G93.40] 01/09/2025    Hypokalemia [E87.6] 01/09/2025    NSTEMI (non-ST elevated myocardial infarction) (HCC) [I21.4] 01/09/2025    Diabetes mellitus type 2 with complications (HCC) [E11.8] 05/09/2023    Essential hypertension [I10] 08/27/2013       Please use [employer] to contact me with any questions during the day.   The hospitalist service will provide cross-coverage for this patient from 7pm to 7am.    During those hours, contact the on-call hospitalist MD/CINDI for questions.

## 2025-01-09 NOTE — TELEPHONE ENCOUNTER
Pt's  calling and states pt fell about 2 weeks ago and hurt her leg (no head injury) and he states pt has been sleeping a lot more than she normally does. He states he seen on the paperwork if pt is taking Metformin liquid and sleeping more than usual to call the Dr. RATLIFF- Pt is in ER right now for alter mental status possible heart attack    # [phone]- Pa

## 2025-01-09 NOTE — H&P
History and Physical  Dr. Michaud  1/9/2025    PCP: Enoch Varner MD    Cc:   Chief Complaint   Patient presents with    Altered Mental Status     Per pt , pt began becoming altered and incomprehensible this around 0945 this morning.        HPI:  Herman Mccarthy is a 84 y.o. female who has a past medical history of Diabetes mellitus (HCC) and Hypertension.     Patient presents with Encephalopathy acute.  HPI  (1-3 for expanded problem focused, >=4 for detailed/comprehensive)      84 y.o. female with a history of hypertension, diabetes and traumatic right frontal subarachnoid hemorrhage in June 2024 who presents to the emergency department today with her  secondary to acute onset confusion.   states that patient woke up around 8 AM this morning and was behaving normally.  He states around 9:15 AM he went out to start the car to bring the patient here for left knee x-rays and when he came back and the patient was laying on the bed and was very confused and was mumbling her words.   states that patient was complaining of right hand numbness.  Patient is alert and oriented x 2 to person and place on arrival.  She is mumbling and seems very confused.     code stroke is called  CT head shows age-related findings with no acute intracranial process  CTA head and neck:      Impression:     1. 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 Moderate luminal irregularity throughout the distal right PCA.  2. 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  Approximately 40% stenosis per NASCET criteria. 30% stenosis of the left  proximal cervical ICA due to fibrocalcific plaque.             stroke team again recommends MRI.  Stroke team does not recommend TNK at this time as patient did have a subarachnoid hemorrhage in June 2024     MRI brain without contrast shows no acute intracranial abnormality    EKG completed shows no signs of acute ischemia or  S1 and S2 and no murmurs    Gastrointestinal: soft, non-tender, non-distended, normal bowel sounds, no masses or organomegaly    Extremities: no clubbing, no edema    Skin:No rashes or nodules noted.    Neurologic:negative         LABS:  Labs Reviewed   URINALYSIS WITH REFLEX TO CULTURE - Abnormal; Notable for the following components:       Result Value    Blood, Urine SMALL (*)     Protein,  (*)     Leukocyte Esterase, Urine TRACE (*)     All other components within normal limits   TROPONIN - Abnormal; Notable for the following components:    Troponin, High Sensitivity 103 (*)     All other components within normal limits   COMPREHENSIVE METABOLIC PANEL - Abnormal; Notable for the following components:    Potassium 2.9 (*)     CO2 18 (*)     Glucose 106 (*)     Calcium 7.2 (*)     Total Protein 5.1 (*)     Albumin 2.9 (*)     ALT <5 (*)     AST 10 (*)     All other components within normal limits    Narrative:     CALL  [facility] tel. [phone],  Chemistry results called to and read back by melba shin rn, 01/09/2025  11:38, by REINIER   TROPONIN - Abnormal; Notable for the following components:    Troponin, High Sensitivity 33 (*)     All other components within normal limits    Narrative:     [phone],  Chemistry results called to and read back by melba shin rn, 01/09/2025  11:38, by REINIER   BRAIN NATRIURETIC PEPTIDE - Abnormal; Notable for the following components:    NT Pro- (*)     All other components within normal limits    Narrative:     CALL  [place]  [facility] [phone],  Chemistry results called to and read back by melba shin rn, 01/09/2025  11:38, by REINIER   MICROSCOPIC URINALYSIS - Abnormal; Notable for the following components:    RBC, UA 7 (*)     All other components within normal limits   POCT GLUCOSE - Abnormal; Notable for the following components:    POC Glucose 197 (*)     All other components within normal limits   CBC WITH AUTO DIFFERENTIAL   APTT  Determination    PROBLEM  High: life threatening unstable chronic illness (I.e. severe COPD, asthma) or acute illness (i.e. MI, PE, ANGELO, stroke,severe resp distress, acute change in neurologic status, suicidal ideation)  PLUS  high: iv narcotics or other iv meds which require monitoring (e.g. digoxin, amiodarone, vancomycin, coumadin, heparin, antiepileptics, chemotherapy,insulin drip, procrit) and high: at least 2 of: personally interpret study, discuss with external specialist, review/order 3+ tests - cbc bmp ct head    OR TIME BASED  N/A - see problem based determination above           The patient is being placed in inpatient status with the expectation of requiring a hospital stay spanning at least two midnights for care and treatment of the problems noted in the problem list.  This determination is also based on thepatients comorbidities and past medical history, the severity and timing of the signs and symptoms upon presentation.    (Please note that portions of this note were completed with a voice recognition program.  Efforts were made to edit the dictations but occasionally words are mis-transcribed.)      Electronically signed by: Adis Michaud MD 1/9/2025    Please use [employer] to contact me with any questions during the day.   The hospitalist service will provide cross-coverage for this patient from 7pm to 7am.    During those hours, contact the on-call hospitalist MD/CINDI for questions.

## 2025-01-09 NOTE — ED NOTES
Patient Name: Herman Mccarthy  : 1940 84 y.o.  MRN: [id number]  ED Room #: ED-0003/03     Chief complaint:   Chief Complaint   Patient presents with    Altered Mental Status     Per pt , pt began becoming altered and incomprehensible this around 0945 this morning.      Hospital Problem/Diagnosis:   Hospital Problems             Last Modified POA    * (Principal) Encephalopathy acute 2025 Yes    Essential hypertension 2025 Yes    Diabetes mellitus type 2 with complications ([facility]) 2025 Yes    Hypokalemia 2025 Yes    NSTEMI (non-ST elevated myocardial infarction) ([facility]) 2025 Yes    Acute encephalopathy 2025 Yes    Malnutrition ([facility]) 2025 Yes         O2 Flow Rate:    (if applicable)  Cardiac Rhythm:   (if applicable)  Active LDA's:   Peripheral IV 25 Right;Anterior Forearm (Active)            How does patient ambulate? Stand by assist- we used a bedside commode when standing and pt tolerated well     2. How does patient take pills? Crushed in Applesauce    3. Is patient alert? Alert    4. Is patient oriented? To Person, To Place, To Time, To Situation, and Follows Commands    5.   Patient arrived from:  home  Facility Name: ___________________________________________    6. If patient is disoriented or from a [facility] Facility has family been notified of admission? No    7. Patient belongings? Belongings: Clothing    Disposition of belongings? Kept with Patient     8. Any specific patient or family belongings/needs/dynamics?   a.  at bedside     9. Miscellaneous comments/pending orders?  a.        If there are any additional questions please reach out to the Emergency Department.

## 2025-01-09 NOTE — CONSULTS
Stroke Team Consult Note    Received stroke consult page 01/09/25 at 1023.    Herman Mccarthy is a 84 y.o. female with tSAH, protein calorie malnutrition, SNHL, HTN, DMII presenting to [facility] 01/09/25 for acute encephalopathy.    Last known well (LKW) on 01/09/25 at 0800 when she woke up, but acutely developed confusion around 0915. Initial /77, , NIHSS not scored (evaluated by SRINI Rubio) - altered mental status, confused speech, motor and sensory grossly intact. CT head negative for hemorrhage, or other acute intracranial abnormality. CTA head and neck with high-grade stenosis vs occlusion of the L PCA - widespread extracranial and intracranial atherosclerotic disease.    Tachycardic to 130bpm at presentation with uptrending Troponin.    Not considered a candidate for thrombolytic with Tenecteplase (TNK) due to previous intracranial hemorrhage, lack of focal findings to suggest stroke.     Pursued wake-up MRI brain to determine if there is acute stroke, and guide decisions on intervention since there is high-grade stenosis vs occlusion of the L PCA.    Wake-up MRI brain negative for stroke.    Undifferentiated encephalopathy - suspect toxic-metabolic related to poor nutrition or acute cardiac process (given elevated NT pro-BNP and troponin with tachycardia). Cannot rule out infectious process, seizures, intoxication.    Lyudmila Barraza MD  Vascular Neurology

## 2025-01-10 VITALS
HEIGHT: 62 IN | DIASTOLIC BLOOD PRESSURE: 78 MMHG | RESPIRATION RATE: 16 BRPM | OXYGEN SATURATION: 95 % | HEART RATE: 80 BPM | SYSTOLIC BLOOD PRESSURE: 154 MMHG | WEIGHT: 97 LBS | TEMPERATURE: 97.7 F | BODY MASS INDEX: 17.85 KG/M2

## 2025-01-10 DIAGNOSIS — E10.65 UNCONTROLLED TYPE 1 DIABETES MELLITUS WITH HYPERGLYCEMIA (HCC): ICD-10-CM

## 2025-01-10 LAB
ALBUMIN SERPL-MCNC: 3.6 G/DL (ref 3.4–5)
ALP SERPL-CCNC: 96 U/L (ref 40–129)
ALT SERPL-CCNC: <5 U/L (ref 10–40)
ANION GAP SERPL CALCULATED.3IONS-SCNC: 10 MMOL/L (ref 3–16)
AST SERPL-CCNC: 13 U/L (ref 15–37)
BASOPHILS # BLD: 0 K/UL (ref 0–0.2)
BASOPHILS NFR BLD: 0.4 %
BILIRUB DIRECT SERPL-MCNC: 0.3 MG/DL (ref 0–0.3)
BILIRUB INDIRECT SERPL-MCNC: 0.3 MG/DL (ref 0–1)
BILIRUB SERPL-MCNC: 0.6 MG/DL (ref 0–1)
BUN SERPL-MCNC: 13 MG/DL (ref 7–20)
CALCIUM SERPL-MCNC: 8.9 MG/DL (ref 8.3–10.6)
CHLORIDE SERPL-SCNC: 102 MMOL/L (ref 99–110)
CO2 SERPL-SCNC: 25 MMOL/L (ref 21–32)
CREAT SERPL-MCNC: 0.7 MG/DL (ref 0.6–1.2)
DEPRECATED RDW RBC AUTO: 13.6 % (ref 12.4–15.4)
EKG ATRIAL RATE: 76 BPM
EKG DIAGNOSIS: NORMAL
EKG P AXIS: 66 DEGREES
EKG P-R INTERVAL: 156 MS
EKG Q-T INTERVAL: 414 MS
EKG QRS DURATION: 72 MS
EKG QTC CALCULATION (BAZETT): 465 MS
EKG R AXIS: 15 DEGREES
EKG T AXIS: 81 DEGREES
EKG VENTRICULAR RATE: 76 BPM
EOSINOPHIL # BLD: 0 K/UL (ref 0–0.6)
EOSINOPHIL NFR BLD: 0.8 %
EST. AVERAGE GLUCOSE BLD GHB EST-MCNC: 148.5 MG/DL
GFR SERPLBLD CREATININE-BSD FMLA CKD-EPI: 85 ML/MIN/{1.73_M2}
GLUCOSE BLD-MCNC: 154 MG/DL (ref 70–99)
GLUCOSE SERPL-MCNC: 135 MG/DL (ref 70–99)
HBA1C MFR BLD: 6.8 %
HCT VFR BLD AUTO: 33.8 % (ref 36–48)
HGB BLD-MCNC: 11.5 G/DL (ref 12–16)
LACTATE BLDV-SCNC: 0.9 MMOL/L (ref 0.4–2)
LYMPHOCYTES # BLD: 1.9 K/UL (ref 1–5.1)
LYMPHOCYTES NFR BLD: 31.1 %
MCH RBC QN AUTO: 29.2 PG (ref 26–34)
MCHC RBC AUTO-ENTMCNC: 34 G/DL (ref 31–36)
MCV RBC AUTO: 85.8 FL (ref 80–100)
MONOCYTES # BLD: 1 K/UL (ref 0–1.3)
MONOCYTES NFR BLD: 17.2 %
NEUTROPHILS # BLD: 3 K/UL (ref 1.7–7.7)
NEUTROPHILS NFR BLD: 50.5 %
PERFORMED ON: ABNORMAL
PLATELET # BLD AUTO: 205 K/UL (ref 135–450)
PMV BLD AUTO: 7.6 FL (ref 5–10.5)
POTASSIUM SERPL-SCNC: 4 MMOL/L (ref 3.5–5.1)
POTASSIUM SERPL-SCNC: 4 MMOL/L (ref 3.5–5.1)
POTASSIUM SERPL-SCNC: 4.4 MMOL/L (ref 3.5–5.1)
PROCALCITONIN SERPL IA-MCNC: 0.1 NG/ML (ref 0–0.15)
PROT SERPL-MCNC: 6.2 G/DL (ref 6.4–8.2)
RBC # BLD AUTO: 3.94 M/UL (ref 4–5.2)
SODIUM SERPL-SCNC: 137 MMOL/L (ref 136–145)
TROPONIN, HIGH SENSITIVITY: 121 NG/L (ref 0–14)
TROPONIN, HIGH SENSITIVITY: 145 NG/L (ref 0–14)
WBC # BLD AUTO: 6 K/UL (ref 4–11)

## 2025-01-10 PROCEDURE — 93010 ELECTROCARDIOGRAM REPORT: CPT | Performed by: INTERNAL MEDICINE

## 2025-01-10 PROCEDURE — APPNB30 APP NON BILLABLE TIME 0-30 MINS

## 2025-01-10 PROCEDURE — 99223 1ST HOSP IP/OBS HIGH 75: CPT | Performed by: STUDENT IN AN ORGANIZED HEALTH CARE EDUCATION/TRAINING PROGRAM

## 2025-01-10 PROCEDURE — APPSS60 APP SPLIT SHARED TIME 46-60 MINUTES

## 2025-01-10 PROCEDURE — 84484 ASSAY OF TROPONIN QUANT: CPT

## 2025-01-10 PROCEDURE — 97162 PT EVAL MOD COMPLEX 30 MIN: CPT

## 2025-01-10 PROCEDURE — 97165 OT EVAL LOW COMPLEX 30 MIN: CPT

## 2025-01-10 PROCEDURE — 6370000000 HC RX 637 (ALT 250 FOR IP): Performed by: INTERNAL MEDICINE

## 2025-01-10 PROCEDURE — G0378 HOSPITAL OBSERVATION PER HR: HCPCS

## 2025-01-10 PROCEDURE — 80076 HEPATIC FUNCTION PANEL: CPT

## 2025-01-10 PROCEDURE — 84145 PROCALCITONIN (PCT): CPT

## 2025-01-10 PROCEDURE — 83605 ASSAY OF LACTIC ACID: CPT

## 2025-01-10 PROCEDURE — 96372 THER/PROPH/DIAG INJ SC/IM: CPT

## 2025-01-10 PROCEDURE — 80048 BASIC METABOLIC PNL TOTAL CA: CPT

## 2025-01-10 PROCEDURE — 97535 SELF CARE MNGMENT TRAINING: CPT

## 2025-01-10 PROCEDURE — 85025 COMPLETE CBC W/AUTO DIFF WBC: CPT

## 2025-01-10 PROCEDURE — 2500000003 HC RX 250 WO HCPCS: Performed by: INTERNAL MEDICINE

## 2025-01-10 PROCEDURE — 84132 ASSAY OF SERUM POTASSIUM: CPT

## 2025-01-10 PROCEDURE — 97116 GAIT TRAINING THERAPY: CPT

## 2025-01-10 PROCEDURE — 6360000002 HC RX W HCPCS: Performed by: INTERNAL MEDICINE

## 2025-01-10 PROCEDURE — 36415 COLL VENOUS BLD VENIPUNCTURE: CPT

## 2025-01-10 RX ADMIN — LISINOPRIL 10 MG: 10 TABLET ORAL at 08:31

## 2025-01-10 RX ADMIN — SODIUM CHLORIDE, PRESERVATIVE FREE 10 ML: 5 INJECTION INTRAVENOUS at 08:32

## 2025-01-10 RX ADMIN — ENOXAPARIN SODIUM 30 MG: 100 INJECTION SUBCUTANEOUS at 08:31

## 2025-01-10 RX ADMIN — INSULIN GLARGINE 7 UNITS: 100 INJECTION, SOLUTION SUBCUTANEOUS at 08:31

## 2025-01-10 NOTE — PROGRESS NOTES
Assessment complete. All admission questions complete with help from  Pa,  declan rec completed. Pt had elevated BP, prn hydralazine given. No c/o pain at this time. All evening medications given. Pt and  state concern with medications, blood sugars, diabetes. They have many questions and would like assistance with getting a walker at home. Bed locked, in lowest position, with bed alarm on. Bedside table and call light within reach.Plan of care ongoing.    2300- After reviewing most recent labs, on call notified. Stat EKG ordered. Lab draw for troponin and potassium ordered. Will monitor through shift

## 2025-01-10 NOTE — PROGRESS NOTES
[facility] - Inpatient Rehabilitation Department   Phone: [phone]    Physical Therapy    [x] Initial Evaluation            [] Daily Treatment Note         [] Discharge Summary      Patient: Herman Mccarthy   : 1940   MRN: [id number]   Date of Service:  1/10/2025  Admitting Diagnosis: Encephalopathy acute  Current Admission Summary: The pt was admitted with a fall, difficulty with speech and RUE numbness.  Found to have a TIA.    Past Medical History:  has a past medical history of Diabetes mellitus (HCC) and Hypertension.  Past Surgical History:  has a past surgical history that includes Cholecystectomy; Colonoscopy (N/A, 2021); Colonoscopy (2021); Colonoscopy (2021); colectomy (N/A, 2021); Upper gastrointestinal endoscopy (N/A, 2024); and Colonoscopy (N/A, 2024).  Discharge Recommendations: Herman Mccarthy scored a 18/24 on the AM-PAC short mobility form. Current research shows that an AM-PAC score of 18 or greater is typically associated with a discharge to the patient's home setting. Based on the patient's AM-PAC score and their current functional mobility deficits, it is recommended that the patient have 2-3 sessions per week of Physical Therapy at d/c to increase the patient's independence.  At this time, this patient demonstrates the endurance and safety to discharge home with home services and a follow up treatment frequency of 2-3x/wk.  Please see assessment section for further patient specific details.  The pt needs 24 hour supervision.  The pt was leaving with the  regardless of PT/OT recommendations.     If patient discharges prior to next session this note will serve as a discharge summary.  Please see below for the latest assessment towards goals.      DME Required For Discharge: rolling walker  Precautions/Restrictions: high fall risk  Weight Bearing Restrictions: no restrictions    Positional Restrictions:no positional  restrictions    Pre-Admission Information     Social/Functional History  Lives With: Spouse  Type of Home: House  Home Layout: One level, Performs ADL's on one level  Home Access: Level entry  Bathroom Shower/Tub: Tub/Shower unit (pt takes baths normally)  Bathroom Toilet: Standard (sink for leverage)  Home Equipment:  Cane  Has the patient had two or more falls in the past year or any fall with injury in the past year?: Yes  ADL Assistance: Pt stated her  \"hovers\" when she is completing ADLs  Homemaking Assistance:  completes, pt goes to the grocery with her  but at times waits in the car  Ambulation Assistance: Independent  Transfer Assistance: Independent  Active : No -not driving since June  Occupation: Retired    Information was taken from admission at [facility] in June 2024.  Information was taken from that chart review as the pt is a poor historian.      Examination   Vision:     Hearing:   WFL    Posture:   Good  Sensation:   WFL  Proprioception:    WFL  Tone:   Normotonic  Coordination Testing:   Coordination and Movement Description: ataxia    ROM:   (B) LE AROM WFL  Strength:   (B) LE strength grossly WFL  Therapist Clinical Decision Making (Complexity): medium complexity  Clinical Presentation: evolving      Subjective  General: Pt was supine in bed upon PT arrival.   was in the hallway talking with nurse about discharging home ASAP.  Pt agreed to complete PT/OT evaluations just before discharge.  Pt was attempting to describe how she was getting OOB and setting the alarm off.  She was making confused conversation throughout the evaluation.   Pain: 0/10  Pain Interventions: not applicable       Functional Mobility  Bed Mobility:  Supine to Sit: minimal assistance  Scooting: minimal assistance  Comments:  Transfers:  Sit to stand transfer: minimal assistance  Stand to sit transfer: minimal assistance  Comments: Pt required several attempts to complete the sit to  stand as she repeatedly fell back on to the bed before standing upright.  Pt was assisted with upper body dressing and donning shoes while sitting EOB.   Ambulation:  Assistive Device: hand-held assist  Assistance: minimal assistance  Distance: 20'   Gait Mechanics: unsteady, posterior lean, required UE support to advance feet  Comments:  Pt ambulated to the sink where she stood with CGA to brush her teeth.   Ambulation Trial 2  Assistive Device: rolling walker  Assistance: contact guard assistance  Distance: 80'  Gait Mechanics: improved balance and mango  Comments:  PT recommended pt use a RW at all times for gait.  Pt discharged home without a RW as  did not want to wait for PT recommendations.     Stair Mobility:  Stair mobility not completed on this date.  Comments:  Wheelchair Mobility:  No w/c mobility completed on this date.  Comments:  Balance:  Static Sitting Balance: fair (-): maintains balance at SBA with use of UE support  Dynamic Sitting Balance: poor (+): requires min (A) to maintain balance  Static Standing Balance: fair (-): maintains balance at CGA with use of UE support  Dynamic Standing Balance: poor (+): requires min (A) to maintain balance  Comments: RN stated the pt was sliding off the bed earlier this morning when attempting to don pants.    Other Therapeutic Interventions    Functional Outcomes  -PAC Inpatient Mobility Raw Score : 18              Cognition  Overall Cognitive Status: Impaired  Comments: distractible, decreased memory, decreased problem solving, decreased safety awareness  Orientation:    oriented to person, disoriented to place, disoriented to time , and disoriented to situation  Command Following:   accurately follows one step commands    Education  Barriers To Learning: cognition  Patient Education: patient educated on PT role and benefits, plan of care, discharge recommendations  Learning Assessment:  patient will require reinforcement due to cognitive

## 2025-01-10 NOTE — PROGRESS NOTES
[facility] - Inpatient Rehabilitation Department   Phone: [phone]    Occupational Therapy    [x] Initial Evaluation            [] Daily Treatment Note         [] Discharge Summary      Patient: Herman Mccarthy   : 1940   MRN: [id number]   Date of Service:  1/10/2025    Admitting Diagnosis:  Encephalopathy acute  Current Admission Summary: 84 y.o. female with a history of hypertension, diabetes and traumatic right frontal subarachnoid hemorrhage in 2024 who presents to the emergency department today with her  secondary to acute onset confusion.  states that patient woke up around 8 AM this morning and was behaving normally. He states around 9:15 AM he went out to start the car to bring the patient here for left knee x-rays and when he came back and the patient was laying on the bed and was very confused and was mumbling her words.  states that patient was complaining of right hand numbness. Patient is alert and oriented x 2 to person and place on arrival. She is mumbling and seems very confused.   Past Medical History:  has a past medical history of Diabetes mellitus (HCC) and Hypertension.  Past Surgical History:  has a past surgical history that includes Cholecystectomy; Colonoscopy (N/A, 2021); Colonoscopy (2021); Colonoscopy (2021); colectomy (N/A, 2021); Upper gastrointestinal endoscopy (N/A, 2024); and Colonoscopy (N/A, 2024).    Discharge Recommendations: Herman Mccarthy scored a 17/24 on the AM-PAC ADL Inpatient form. Current research shows that an AM-PAC score of 18 or greater is typically associated with a discharge to the patient's home setting. Based on the patient's AM-PAC score, and their current ADL deficits, it is recommended that the patient have 2-3 sessions per week of Occupational Therapy at d/c to increase the patient's independence.  At this time, this patient demonstrates the endurance and safety to discharge home  goals are ongoing at this time unless indicated above.       Therapy Session Time     Individual Group Co-treatment   Time In    1103   Time Out    1126   Minutes    23        Timed Code Treatment Minutes:   09  Total Treatment Minutes:  23       Electronically Signed By: Deuce Stewart OT

## 2025-01-10 NOTE — PROGRESS NOTES
.4 Eyes Skin Assessment     NAME:  Herman Mccarthy  YOB: 1940  MEDICAL RECORD NUMBER:  [id number]    The patient is being assessed for  Admission    I agree that at least one RN has performed a thorough Head to Toe Skin Assessment on the patient. ALL assessment sites listed below have been assessed.      Areas assessed by both nurses:    Head, Face, Ears, Shoulders, Back, Chest, Arms, Elbows, Hands, Sacrum. Buttock, Coccyx, Ischium, and Legs. Feet and Heels        Does the Patient have a Wound? No noted wound(s)       Saúl Prevention initiated by RN: No  Wound Care Orders initiated by RN: No    Pressure Injury (Stage 3,4, Unstageable, DTI, NWPT, and Complex wounds) if present, place Wound referral order by RN under : No    New Ostomies, if present place, Ostomy referral order under : No     Nurse 1 eSignature: Electronically signed by Prema Mattson RN on 1/10/25 at 5:03 AM EST    **SHARE this note so that the co-signing nurse can place an eSignature**    Nurse 2 eSignature: Electronically signed by Karlie Guerrero RN on 1/10/25 at 5:13 AM EST

## 2025-01-10 NOTE — DISCHARGE SUMMARY
[facility] -- Physician Discharge Summary     Herman Mccarthy  1940  MRN: [id number]    Admit Date: 1/9/2025  Discharge Date: No discharge date for patient encounter.    Attending MD: Adis Michaud MD  Discharging MD: Adis Michaud MD  PCP: Enoch Varner MD [facility] [address] [phone]    Admission Diagnosis: Hypocalcemia [E83.51]  Hypokalemia [E87.6]  Hypoproteinemia (HCC) [E77.8]  Hypoalbuminemia [E88.09]  NSTEMI (non-ST elevated myocardial infarction) (HCC) [I21.4]  Acute encephalopathy [G93.40]  DISCHARGE DIAGNOSIS: same    Full Hospital Problem List:  Active Hospital Problems    Diagnosis Date Noted    Encephalopathy acute [G93.40] 01/09/2025    Hypokalemia [E87.6] 01/09/2025    NSTEMI (non-ST elevated myocardial infarction) (HCC) [I21.4] 01/09/2025    Acute encephalopathy [G93.40] 01/09/2025    Malnutrition (HCC) [E46] 01/09/2025    Diabetes mellitus type 2 with complications (HCC) [E11.8] 05/09/2023    Essential hypertension [I10] 08/27/2013           Hospital Course:  84 y.o. female with a history of hypertension, diabetes and traumatic right frontal subarachnoid hemorrhage in June 2024 who presents to the emergency department today with her  secondary to acute onset confusion.   states that patient woke up around 8 AM this morning and was behaving normally.  He states around 9:15 AM he went out to start the car to bring the patient here for left knee x-rays and when he came back and the patient was laying on the bed and was very confused and was mumbling her words.   states that patient was complaining of right hand numbness.  Patient is alert and oriented x 2 to person and place on arrival.  She is mumbling and seems very confused.      code stroke is called  CT head shows age-related findings with no acute intracranial process  CTA head and neck:              Impression:      1. 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 She  reports that she has quit smoking. Her smoking use included cigarettes. She has a 5 pack-year smoking history. She has never used smokeless tobacco. She reports that she does not drink alcohol and does not use drugs..      Current Facility-Administered Medications: insulin glargine (LANTUS) injection vial 7 Units, 7 Units, SubCUTAneous, QAM  lisinopril (PRINIVIL;ZESTRIL) tablet 10 mg, 10 mg, Oral, Daily  diclofenac sodium (VOLTAREN) 1 % gel 4 g, 4 g, Topical, 4x Daily PRN  0.9 % sodium chloride infusion, , IntraVENous, Continuous  sodium chloride flush 0.9 % injection 5-40 mL, 5-40 mL, IntraVENous, 2 times per day  sodium chloride flush 0.9 % injection 5-40 mL, 5-40 mL, IntraVENous, PRN  0.9 % sodium chloride infusion, , IntraVENous, PRN  potassium chloride (KLOR-CON M) extended release tablet 40 mEq, 40 mEq, Oral, PRN **OR** potassium bicarb-citric acid (EFFER-K) effervescent tablet 40 mEq, 40 mEq, Oral, PRN **OR** potassium chloride 10 mEq/100 mL IVPB (Peripheral Line), 10 mEq, IntraVENous, PRN  enoxaparin Sodium (LOVENOX) injection 30 mg, 30 mg, SubCUTAneous, Daily  ondansetron (ZOFRAN-ODT) disintegrating tablet 4 mg, 4 mg, Oral, Q8H PRN **OR** ondansetron (ZOFRAN) injection 4 mg, 4 mg, IntraVENous, Q6H PRN  magnesium hydroxide (MILK OF MAGNESIA) 400 MG/5ML suspension 30 mL, 30 mL, Oral, Daily PRN  acetaminophen (TYLENOL) tablet 650 mg, 650 mg, Oral, Q6H PRN **OR** acetaminophen (TYLENOL) suppository 650 mg, 650 mg, Rectal, Q6H PRN  hydrALAZINE (APRESOLINE) injection 10 mg, 10 mg, IntraVENous, Q6H PRN  sodium chloride 0.9 % bolus 500 mL, 500 mL, IntraVENous, PRN  insulin lispro (HUMALOG,ADMELOG) injection vial 0-8 Units, 0-8 Units, SubCUTAneous, 4x Daily AC & HS  dextrose bolus 10% 125 mL, 125 mL, IntraVENous, PRN **OR** dextrose bolus 10% 250 mL, 250 mL, IntraVENous, PRN  glucagon

## 2025-01-10 NOTE — PROGRESS NOTES
Pt's  attempting to get patient out of bed multiple times. Pt and her  both redirected multiple times. Pt returned back to bed. Both requesting to be discharged. Call out to neuro to see if pt can be d/c from their standpoint.

## 2025-01-10 NOTE — CONSULTS
In patient Neurology consult    [facility] Neurology  MD Jefferson Gavirianico Castillogloria  1940    Date of Service: 1/10/2025    Referring Physician: Adis Michaud MD      Reason for the consult and CC: Acute encephalopathy, code stroke    HPI:   The patient is a 84 y.o.  years old female with past medical history of hypertension, hyperlipidemia diabetes, SAH, and other medical problems comes to the hospital with acute confusion and right hand numbness.  Degree severe duration persistent.  Patient has been bedside eval for collateral history.  Patient woke up in her normal state yesterday morning as the morning went on, patient noted to have some confusion and was noted to have difficulty with her words.  She was also complaining to her  of right hand numbness.  No facial droop, unilateral extremity weakness, dizziness during this time.  She was brought to the emergency room for further evaluation.  Patient underwent CT head which showed no acute intracranial abnormality.  CTA showed multifocal intracranial stenosis and left ICA stenosis.  Stroke team was consulted and was not a candidate for TNK.  Lab workup showed hypokalemia and elevated troponins.  She was admitted to the hospital further evaluation.  She underwent MRI brain which showed no acute intracranial abnormality.  Neurology was consulted.  Patient seen this morning, she appears back to her baseline.  Blood pressure seems as uncontrolled in the hospital, systolic 160-170s.  She normally does not check blood pressure at home.  Patient also has a history of subarachnoid hemorrhage June 2024 due to a fall.  She currently is not on any antiplatelet therapy.  She denies headaches, dizziness, dysarthria or dysphagia.  Other review of systems unremarkable.       Constitutional:   Vitals:    01/09/25 2137 01/09/25 2315 01/10/25 0752 01/10/25 0831   BP:  (!) 164/74 (!) 175/80 (!) 175/80   Pulse:   80    Resp:   16    Temp:   97.7 °F (36.5 °C)   Drugs/treatments that require intensive monitoring for toxicity include:    [] Change in code status:    [] Decision to escalate care:    [] Major surgery/procedure with associated risk factors:    [x] Prescription drug management  ----------------------------------------------------------------------  [x] High (any 2)  [] Moderate (any 1)    C. Data (any 2 for high and any 1 for moderate)  [x] Discussed management of the case with: Spouse  [x] Imaging personally reviewed and interpreted, includes:    [x] Data Review (any 3)  [x] Collateral history obtained from: Spouse  [x] All available Consultant notes from yesterday/today were reviewed  [x] All current labs were reviewed and interpreted for clinical significance   [x] Appropriate follow-up labs were ordered    Data: reviewed   LABS:   Lab Results   Component Value Date/Time     01/10/2025 02:45 AM    K 4.0 01/10/2025 02:45 AM    K 4.0 01/10/2025 12:50 AM     01/10/2025 02:45 AM    CO2 25 01/10/2025 02:45 AM    BUN 13 01/10/2025 02:45 AM    CREATININE 0.7 01/10/2025 02:45 AM    GFRAA >60 05/24/2022 07:58 AM    GFRAA >60 05/22/2012 09:01 AM    LABGLOM 85 01/10/2025 02:45 AM    LABGLOM >60 11/15/2023 08:41 AM    GLUCOSE 135 01/10/2025 02:45 AM    PHOS 2.4 06/20/2024 07:06 AM    MG 1.80 03/20/2019 04:49 AM    CALCIUM 8.9 01/10/2025 02:45 AM     Lab Results   Component Value Date/Time    WBC 6.0 01/10/2025 02:45 AM    RBC 3.94 01/10/2025 02:45 AM    HGB 11.5 01/10/2025 02:45 AM    HCT 33.8 01/10/2025 02:45 AM    MCV 85.8 01/10/2025 02:45 AM    RDW 13.6 01/10/2025 02:45 AM     01/10/2025 02:45 AM     Lab Results   Component Value Date    INR 0.96 01/09/2025    PROTIME 13.0 01/09/2025       Neuroimaging was independently reviewed by myself and discussed results with the patient  Reviewed notes from different physicians including H&P and ED notes.  Reviewed lab and blood testing    Impression:     Acute encephalopathy and right hand numbness.  Could

## 2025-01-10 NOTE — CARE COORDINATION
Case Management -  Discharge Note      Patient Name: Herman Mccarthy                   YOB: 1940  Room: [unfilled]            Readmission Risk (Low < 19, Mod (19-27), High > 27): Readmission Risk Score: 13.5    Current PCP: Enoch Varner MD      (IMM) Important Message from Medicare:    Has pt received appropriate compliance notices before being discharged if required: N/A  Compliance doc:  [] 2nd IMM; [] Code 44 [] Moon  Date: NA    PT AM-PAC: 18 /24  OT AM-PAC: 17 /24    Patient/patient representative has been educated on the benefits of NA as well as the possible risks of declining recommended services. Patient/patient representative has acknowledged the information provided and decided on the following discharge plan. Patient/ patient representative has been provided freedom of choice regarding service provider, supported by basic dialogue that supports the patient's individualized plan of care/goals.    (Add Smart Phrase Here/Delete)      [facility] agency notified of discharge:  [] Yes [] No  [x] NA    Family notified of discharge:  [] Yes  [] No  [x] NA    Facility notified of discharge:  [] Yes  [] No  [x] NA     Financial    Payor: [employer] MEDICARE / Plan: [employer] MEDICARE-ADVANTAGE PPO / Product Type: Medicare /     Pharmacy:  Potential assistance Purchasing Medications: No  Meds-to-Beds request:        [id number] - [facility] [address] [phone] - F [phone]  [address] [facility] [address]  Phone: [phone] Fax: [phone]    [id number] - [place], [place] - [address] [phone] - F [phone]  [address] [facility] [facility] [address]  Phone: [phone] Fax: [phone]      Notes:    Additional Case Management Notes: Patient was discharged before SW was able to meet with patient and family at bedside.  Electronically signed by SURJIT Bean on 1/10/25 at 1:34 PM EST          Duonebs scheduled q4hrs  Monitor respiratory status, at risk for intubation  Continuous pulse oximetry, ABG prn  Start CPT, pulmonary toileting

## 2025-01-10 NOTE — CONSULTS
[facility]   CONSULTATION        Chief Complaint   Patient presents with    Altered Mental Status     Per pt , pt began becoming altered and incomprehensible this around 0945 this morning.       History of Present Illness:  Herman Mccarthy is a 84 y.o. patient who presented to the hospital with complaints of AMS. I have been asked to provide consultation regarding further management and testing.    Patient admitted with acute encephalopathy   Had a decent discussion today with her and her  at bedside appears as though she is back to her current baseline.  She reports to me that she did not feel confused yesterday but she did feel overall weak and her actual complaint was a fall with pain in her foot.  Denies any active chest pain.  Not fairly exertional but however she states that due to her age as well as arthritic pain in her foot when it gets cold.    Cardiology consulted as she had moderate biomarker elevation.    Past Medical History:   has a past medical history of Diabetes mellitus (HCC) and Hypertension.    Surgical History:   has a past surgical history that includes Cholecystectomy; Colonoscopy (N/A, 6/21/2021); Colonoscopy (6/21/2021); Colonoscopy (6/21/2021); colectomy (N/A, 8/25/2021); Upper gastrointestinal endoscopy (N/A, 5/17/2024); and Colonoscopy (N/A, 5/17/2024).     Social History:   reports that she has quit smoking. Her smoking use included cigarettes. She has a 5 pack-year smoking history. She has never used smokeless tobacco. She reports that she does not drink alcohol and does not use drugs.     Family History:  No family history of premature coronary artery disease, aortic disease, or valve disease.    Home Medications:  Were reviewed and are listed in nursing record. and/or listed below  Prior to Admission medications    Medication Sig Start Date End Date Taking? Authorizing Provider   Insulin Pen Needle (BD PEN NEEDLE MIHAELA 2ND GEN) 32G X 4 MM MISC USE TO INJECT  5-40 mL  5-40 mL IntraVENous PRN Adis Michaud MD        0.9 % sodium chloride infusion   IntraVENous PRN Adis Michaud MD        potassium chloride (KLOR-CON M) extended release tablet 40 mEq  40 mEq Oral PRN Adis Michaud MD        Or    potassium bicarb-citric acid (EFFER-K) effervescent tablet 40 mEq  40 mEq Oral PRN Adis Michaud MD        Or    potassium chloride 10 mEq/100 mL IVPB (Peripheral Line)  10 mEq IntraVENous PRN Adis Michaud MD        enoxaparin Sodium (LOVENOX) injection 30 mg  30 mg SubCUTAneous Daily Adis Michaud MD   30 mg at 01/10/25 0831    ondansetron (ZOFRAN-ODT) disintegrating tablet 4 mg  4 mg Oral Q8H PRN Adis Michaud MD        Or    ondansetron (ZOFRAN) injection 4 mg  4 mg IntraVENous Q6H PRN Adis Michaud MD        magnesium hydroxide (MILK OF MAGNESIA) 400 MG/5ML suspension 30 mL  30 mL Oral Daily PRN Adis Michaud MD        acetaminophen (TYLENOL) tablet 650 mg  650 mg Oral Q6H PRN Adis Michaud MD        Or    acetaminophen (TYLENOL) suppository 650 mg  650 mg Rectal Q6H PRN Adis Michaud MD        hydrALAZINE (APRESOLINE) injection 10 mg  10 mg IntraVENous Q6H PRN Adis Michaud MD   10 mg at 01/09/25 2052    sodium chloride 0.9 % bolus 500 mL  500 mL IntraVENous PRN Adis Michaud MD        insulin lispro (HUMALOG,ADMELOG) injection vial 0-8 Units  0-8 Units SubCUTAneous 4x Daily AC & HS Adis Michaud MD        dextrose bolus 10% 125 mL  125 mL IntraVENous PRN Adis Michaud MD        Or    dextrose bolus 10% 250 mL  250 mL IntraVENous PRN Adis Michaud MD        glucagon injection 1 mg  1 mg SubCUTAneous PRN Adis Michaud MD        dextrose 10 % infusion   IntraVENous Continuous PRN Adis Michaud MD        HYDROmorphone HCl PF (DILAUDID) injection 0.25 mg  0.25 mg IntraVENous Once PRN Sony-Marker, Thalia, APRN - CNP            Allergies:  Asa [aspirin], Codeine, Meloxicam, and Penicillins     Review of

## 2025-01-10 NOTE — PROGRESS NOTES
Patient and patient's  adamant that they want to be discharged. Encouraged both to wait for testing and PT/OT recommendations. Both decline. Discussed with MD. Dc order received. IVs removed and dressing applied. Encouraged patient and  to f/u with PCP asap. Instructions reviewed with patient and . State understanding. Belongings gathered and pt transported out by this RN and d/c to home.

## 2025-01-13 ENCOUNTER — CARE COORDINATION (OUTPATIENT)
Dept: CASE MANAGEMENT | Age: 85
End: 2025-01-13

## 2025-01-13 ENCOUNTER — TELEPHONE (OUTPATIENT)
Dept: ENDOCRINOLOGY | Age: 85
End: 2025-01-13

## 2025-01-13 DIAGNOSIS — E10.65 UNCONTROLLED TYPE 1 DIABETES MELLITUS WITH HYPERGLYCEMIA (HCC): ICD-10-CM

## 2025-01-13 RX ORDER — INSULIN ASPART 100 [IU]/ML
INJECTION, SOLUTION INTRAVENOUS; SUBCUTANEOUS
Qty: 15 ML | Refills: 3 | Status: SHIPPED | OUTPATIENT
Start: 2025-01-13

## 2025-01-13 RX ORDER — PEN NEEDLE, DIABETIC 32GX 5/32"
NEEDLE, DISPOSABLE MISCELLANEOUS
Qty: 400 EACH | Refills: 3 | Status: SHIPPED | OUTPATIENT
Start: 2025-01-13

## 2025-01-13 NOTE — TELEPHONE ENCOUNTER
Medication:   Requested Prescriptions     Pending Prescriptions Disp Refills    insulin aspart (NOVOLOG FLEXPEN) 100 UNIT/ML injection pen [Pharmacy Med Name: NOVOLOG 100 UNIT/ML FLEXPEN]  3     Sig: INJECT 3 UNITS UNDER THE SKIN WITH EACH MEAL       Last appt: 10/9/2024   Next appt: 2/18/2025    Last Labs DM:   Lab Results   Component Value Date/Time    LABA1C 6.8 01/09/2025 09:14 PM     Last Lipid:   Lab Results   Component Value Date/Time    CHOL 127 06/19/2024 06:37 AM    TRIG 80 06/19/2024 06:37 AM    HDL 54 06/19/2024 06:37 AM     Last PSA: No results found for: \"PSA\"  Last Thyroid:   Lab Results   Component Value Date/Time    TSH 1.24 10/09/2024 11:18 AM    C7FRZQV 0.68 10/07/2019 10:25 AM    T4FREE 1.2 11/05/2022 08:47 AM

## 2025-01-13 NOTE — CARE COORDINATION
Care Transitions Note    Initial Call - Call within 2 business days of discharge: Yes    Patient Current Location:  [place]    Care Transition Nurse contacted the patient by telephone to perform post hospital discharge assessment, verified name and  as identifiers. Provided introduction to self, and explanation of the Care Transition Nurse role.     Patient: Herman Mccarthy    Patient : 1940   MRN: [id number]    Reason for Admission: Cynthia- Encephalopathy  Discharge Date: 1/10/25  RURS: Readmission Risk Score: 13.5      Last Discharge Facility       Date Complaint Diagnosis Description Type Department Provider    25 Altered Mental Status Acute encephalopathy ... ED to Hosp-Admission (Discharged) (ADMITTED) [facility] Adis Michaud MD; Citlalli Bryant...            Was this an external facility discharge? No    Additional needs identified to be addressed with provider   High priority: please assist with HFU scheduling with PCP . Call placed to office [phone] and spoke to Anjelica             Method of communication with provider: phone call. Spoke Anjelica    Patients top risk factors for readmission: functional physical ability    Interventions to address risk factors:   Review of patient management of conditions/medications: encephalopathy    Care Summary Note: Patient answered call and verified . Patient pleasant and agreeable to transition call. Patient was noted to be in vehicle and running errands at time of call. Patient stated her  was driving and could talk, but call was brief. Confirmed she has all prescriptions and declined full medication reconciliation because she didn't have paperwork and was in the car. CTN offered to schedule HFU with PCP, but declined and message routed to PCP office. Stated she is doing \"alright\" and denied any acute needs at present time.  Agreeable to f/u calls.  Educated on the use of urgent care or physician’s 24 hr access line if assistance is needed after

## 2025-01-13 NOTE — TELEPHONE ENCOUNTER
Call from pt's spouse stating that [facility] pharmacy is requesting a 90 day supply for Rx   Insulin Pen Needle (BD PEN NEEDLE MIHAELA 2ND GEN) 32G X 4 MM     Please advise

## 2025-01-14 PROBLEM — E13.22: Status: ACTIVE | Noted: 2023-05-09

## 2025-01-14 PROBLEM — N18.31 STAGE 3A CHRONIC KIDNEY DISEASE (HCC): Status: RESOLVED | Noted: 2023-11-15 | Resolved: 2025-01-14

## 2025-01-14 PROBLEM — N18.31 STAGE 3A CHRONIC KIDNEY DISEASE (HCC): Status: ACTIVE | Noted: 2023-11-15

## 2025-01-14 PROBLEM — R56.9 SEIZURE WITH PROVOKING FACTOR (HCC): Status: RESOLVED | Noted: 2024-06-19 | Resolved: 2025-01-14

## 2025-01-14 PROBLEM — E13.22: Status: RESOLVED | Noted: 2023-05-09 | Resolved: 2025-01-14

## 2025-01-20 ENCOUNTER — CARE COORDINATION (OUTPATIENT)
Dept: CASE MANAGEMENT | Age: 85
End: 2025-01-20

## 2025-01-20 NOTE — CARE COORDINATION
Care Transitions Note    Follow Up Call     Patient Current Location:  Home: [address] [place] [id number]    Care Transition Nurse contacted the patient by telephone. Verified name and  as identifiers.    Additional needs identified to be addressed with provider   No needs identified         Method of communication with provider: none.    Care Summary Note: Patient answered call and verified . Patient pleasant and agreeable to transition call.  Patient continues to have soreness in left knee from a fall about a month ago. Stated she had xrays during her hospitalization and nothing showed fractured. Patient is taking OTC medication for pain management with good relief. Stated she will elevate when sitting or lying down. Denied any acute needs at present time.  Agreeable to f/u calls.  Educated on the use of urgent care or physician’s 24 hr access line if assistance is needed after hours.    Plan of care updates since last contact:  Review of patient management of conditions/medications:         Advance Care Planning:   Does patient have an Advance Directive: reviewed during previous call, see note. .    Medication Review:  Full medication reconciliation completed during previous call.    Remote Patient Monitoring:  Offered patient enrollment in the Remote Patient Monitoring (RPM) program for in-home monitoring: Yes, but did not enroll at this time: did not want to participate .    Assessments:  Care Transitions Subsequent and Final Call    Subsequent and Final Calls  Care Transitions Interventions  Other Interventions:              Follow Up Appointment:   Reviewed upcoming appointment(s). and DAVID appointment attended as scheduled   Future Appointments         Provider Specialty Dept Phone    2025 10:15 AM Enoch Varner MD Internal Medicine [phone]    2025 11:00 AM Sangeetha Villegas MD Endocrinology [phone]            Care Transition Nurse provided contact information.  Plan for

## 2025-01-22 NOTE — PROGRESS NOTES
Physician Progress Note      PATIENT:               ADELINE HART  [facility] #:                  [id number]  :                       1940  ADMIT DATE:       2025 10:16 AM  DISCH DATE:        1/10/2025 11:46 AM  RESPONDING  PROVIDER #:        Adis MACIAS MD          QUERY TEXT:    Patient admitted -1/10 with acute onset confusion and has encephalopathy   documented. If possible, please document in progress notes to further specify   the type of encephalopathy and etiology.    The medical record reflects the following:  Risk Factors: 84 year old female  Clinical Indicators: On admit K 2.9, Calcium 7.2.  CTA head and neck:   Impression: 1. 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 Moderate luminal irregularity throughout the distal   right PCA. 2. 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   Approximately 40% stenosis per NASCET criteria. 30% stenosis of the left   proximal cervical ICA due to fibrocalcific plaque.  DC Summary: \"Pt appears to be back at baseline, and this is confirmed by her    who is present.\"  Neuro 1/10: \"Acute encephalopathy and right hand numbness. Could be new TIA.    MRI showed no acute stroke.\"  Neuro : \"Undifferentiated encephalopathy - suspect toxic-metabolic related   to poor nutrition or acute cardiac process (given elevated NT pro-BNP and   troponin with tachycardia). Cannot rule out infectious process, seizures,   intoxication.\"  H&P: \"BMP with a hypokalemia of 2.9 and hypocalcemia of 7.2.\"  ED: \"Patient is alert and oriented x 2 to person and place on arrival.  She is   mumbling and seems very confused.\"    Treatment: Neuro consult, CTA head/neck, CThead, MRI brain, KCl replacement  Options provided:  -- Acute encephalopathy likely related to TIA due to high grade stenosis of   left PCA.  -- Metabolic encephalopathy due to electrolyte imbalances of hypokalemia and   hypocalcemia.  -- Encephalopathy due to

## 2025-01-29 ENCOUNTER — CARE COORDINATION (OUTPATIENT)
Dept: CASE MANAGEMENT | Age: 85
End: 2025-01-29

## 2025-01-29 NOTE — CARE COORDINATION
on severity of symptoms and risk factors.  Plan for next call: self management-       Irais Gandhi RN

## 2025-02-05 ENCOUNTER — CARE COORDINATION (OUTPATIENT)
Dept: CARE COORDINATION | Age: 85
End: 2025-02-05

## 2025-02-05 NOTE — CARE COORDINATION
Care Transitions Note    Follow Up Call     Attempted to reach patient for transitions of care follow up.  Unable to reach patient.      Outreach Attempts:   Unable to leave message. Mailbox full.     Follow Up Appointment:   Future Appointments         Provider Specialty Dept Phone    2/18/2025 11:00 AM Sangeetha Villegas MD Endocrinology [phone]    2/20/2025 9:30 AM Enoch Varner MD Internal Medicine [phone]            Plan for follow-up call in 2-5 days based on severity of symptoms and risk factors. Plan for next call: symptom management-     Haley Salinas

## 2025-02-07 ENCOUNTER — CARE COORDINATION (OUTPATIENT)
Dept: CASE MANAGEMENT | Age: 85
End: 2025-02-07

## 2025-02-07 NOTE — CARE COORDINATION
Care Transitions Note    Follow Up Call     Patient Current Location:  Home: [address] [place] [id number]    Care Transition Nurse contacted the spouse/partner  by telephone. Verified name and  as identifiers.    Additional needs identified to be addressed with provider   No needs identified           Method of communication with provider: none.    Care Summary Note: Patient's  answered call and verified pateint's . Pleasant and agreeable to transition call. Patient was eating and request that  speak to CTN. Patient is doing well and walking better. Stated she had callus removed from her foot earlier this week. Denied any pain or drainage from procedure. Taking all medication as directed. Reviewed scheduled appts noted in system. Denied any acute needs at present time.  Agreeable to f/u calls.  Educated on the use of urgent care or physician’s 24 hr access line if assistance is needed after hours.      Plan of care updates since last contact:  Review of patient management of conditions/medications:         Advance Care Planning:   Does patient have an Advance Directive: reviewed during previous call, see note. .    Medication Review:  Full medication reconciliation completed during previous call.    Remote Patient Monitoring:  Offered patient enrollment in the Remote Patient Monitoring (RPM) program for in-home monitoring: Yes, but did not enroll at this time: controlled chronic disease management.    Assessments:  Care Transitions Subsequent and Final Call    Subsequent and Final Calls  Care Transitions Interventions  Other Interventions:              Follow Up Appointment:   Reviewed upcoming appointment(s). and DAVID appointment attended as scheduled   Future Appointments         Provider Specialty Dept Phone    2025 11:00 AM Sangeetha Villegas MD Endocrinology [phone]    2025 9:30 AM Enoch Varner MD Internal Medicine [phone]            Care Transition Nurse provided

## 2025-02-10 ENCOUNTER — TELEPHONE (OUTPATIENT)
Dept: ENDOCRINOLOGY | Age: 85
End: 2025-02-10

## 2025-02-10 ENCOUNTER — HOSPITAL ENCOUNTER (OUTPATIENT)
Age: 85
Discharge: HOME | End: 2025-02-10
Payer: MEDICARE

## 2025-02-10 DIAGNOSIS — E10.65 UNCONTROLLED TYPE 1 DIABETES MELLITUS WITH HYPERGLYCEMIA (HCC): ICD-10-CM

## 2025-02-10 DIAGNOSIS — N18.31 STAGE 3A CHRONIC KIDNEY DISEASE (HCC): ICD-10-CM

## 2025-02-10 DIAGNOSIS — I10 ESSENTIAL HYPERTENSION: ICD-10-CM

## 2025-02-10 LAB
ALBUMIN SERPL-MCNC: 4.3 G/DL (ref 3.4–5)
ALBUMIN/GLOB SERPL: 1.5 {RATIO} (ref 1.1–2.2)
ALP SERPL-CCNC: 96 U/L (ref 40–129)
ALT SERPL-CCNC: <5 U/L (ref 10–40)
ANION GAP SERPL CALCULATED.3IONS-SCNC: 9 MMOL/L (ref 3–16)
AST SERPL-CCNC: 13 U/L (ref 15–37)
BILIRUB SERPL-MCNC: 0.7 MG/DL (ref 0–1)
BUN SERPL-MCNC: 16 MG/DL (ref 7–20)
CALCIUM SERPL-MCNC: 9.6 MG/DL (ref 8.3–10.6)
CHLORIDE SERPL-SCNC: 104 MMOL/L (ref 99–110)
CHOLEST SERPL-MCNC: 161 MG/DL (ref 0–199)
CO2 SERPL-SCNC: 27 MMOL/L (ref 21–32)
CREAT SERPL-MCNC: 0.9 MG/DL (ref 0.6–1.2)
CREAT UR-MCNC: 31.6 MG/DL (ref 28–259)
EST. AVERAGE GLUCOSE BLD GHB EST-MCNC: 151.3 MG/DL
GFR SERPLBLD CREATININE-BSD FMLA CKD-EPI: 63 ML/MIN/{1.73_M2}
GLUCOSE SERPL-MCNC: 158 MG/DL (ref 70–99)
HBA1C MFR BLD: 6.9 %
HDLC SERPL-MCNC: 53 MG/DL (ref 40–60)
LDLC SERPL CALC-MCNC: 95 MG/DL
MICROALBUMIN UR DL<=1MG/L-MCNC: 2.34 MG/DL
MICROALBUMIN/CREAT UR: 74.1 MG/G (ref 0–30)
POTASSIUM SERPL-SCNC: 4.8 MMOL/L (ref 3.5–5.1)
PROT SERPL-MCNC: 7.1 G/DL (ref 6.4–8.2)
SODIUM SERPL-SCNC: 140 MMOL/L (ref 136–145)
TRIGL SERPL-MCNC: 67 MG/DL (ref 0–150)
TSH SERPL DL<=0.005 MIU/L-ACNC: 2.45 UIU/ML (ref 0.27–4.2)
VLDLC SERPL CALC-MCNC: 13 MG/DL

## 2025-02-10 PROCEDURE — 80061 LIPID PANEL: CPT

## 2025-02-10 PROCEDURE — 36415 COLL VENOUS BLD VENIPUNCTURE: CPT

## 2025-02-10 PROCEDURE — 82570 ASSAY OF URINE CREATININE: CPT

## 2025-02-10 PROCEDURE — 84443 ASSAY THYROID STIM HORMONE: CPT

## 2025-02-10 PROCEDURE — 82043 UR ALBUMIN QUANTITATIVE: CPT

## 2025-02-10 PROCEDURE — 80053 COMPREHEN METABOLIC PANEL: CPT

## 2025-02-10 PROCEDURE — 83036 HEMOGLOBIN GLYCOSYLATED A1C: CPT

## 2025-02-11 ENCOUNTER — CARE COORDINATION (OUTPATIENT)
Dept: CASE MANAGEMENT | Age: 85
End: 2025-02-11

## 2025-02-11 NOTE — CARE COORDINATION
Care Transitions Note    Final Call     Attempted to reach patient, spouse/partner  for transitions of care follow up.  Unable to reach patient, spouse/partner .      Outreach Attempts:   Unable to leave message.     Patient graduated from the Care Transitions program.  Patient/family has the ability to self manage at this time..      Handoff:   Patient was not referred to the ACM team due to unable to contact patient.      Care Summary Note:     Assessments:  Care Transitions Subsequent and Final Call    Subsequent and Final Calls  Care Transitions Interventions  Other Interventions:              Upcoming Appointments:    Future Appointments         Provider Specialty Dept Phone    2/18/2025 11:00 AM Sangeteha Villegas MD Endocrinology [phone]    2/20/2025 9:30 AM Enoch Varner MD Internal Medicine [phone]            Irais Gandhi RN

## 2025-02-18 ENCOUNTER — OFFICE VISIT (OUTPATIENT)
Dept: ENDOCRINOLOGY | Age: 85
End: 2025-02-18
Payer: MEDICARE

## 2025-02-18 VITALS
BODY MASS INDEX: 18 KG/M2 | RESPIRATION RATE: 14 BRPM | HEIGHT: 62 IN | HEART RATE: 72 BPM | DIASTOLIC BLOOD PRESSURE: 84 MMHG | SYSTOLIC BLOOD PRESSURE: 160 MMHG | TEMPERATURE: 98 F | WEIGHT: 97.8 LBS

## 2025-02-18 DIAGNOSIS — E13.9 DIABETES MELLITUS TYPE 1.5, MANAGED AS TYPE 2 (HCC): Primary | ICD-10-CM

## 2025-02-18 DIAGNOSIS — I10 ESSENTIAL HYPERTENSION: ICD-10-CM

## 2025-02-18 DIAGNOSIS — I60.9 SUBARACHNOID HEMORRHAGE (HCC): ICD-10-CM

## 2025-02-18 PROCEDURE — G2211 COMPLEX E/M VISIT ADD ON: HCPCS | Performed by: INTERNAL MEDICINE

## 2025-02-18 PROCEDURE — 3077F SYST BP >= 140 MM HG: CPT | Performed by: INTERNAL MEDICINE

## 2025-02-18 PROCEDURE — 99214 OFFICE O/P EST MOD 30 MIN: CPT | Performed by: INTERNAL MEDICINE

## 2025-02-18 PROCEDURE — 3044F HG A1C LEVEL LT 7.0%: CPT | Performed by: INTERNAL MEDICINE

## 2025-02-18 PROCEDURE — 1159F MED LIST DOCD IN RCRD: CPT | Performed by: INTERNAL MEDICINE

## 2025-02-18 PROCEDURE — 3079F DIAST BP 80-89 MM HG: CPT | Performed by: INTERNAL MEDICINE

## 2025-02-18 PROCEDURE — 1160F RVW MEDS BY RX/DR IN RCRD: CPT | Performed by: INTERNAL MEDICINE

## 2025-02-18 PROCEDURE — 1123F ACP DISCUSS/DSCN MKR DOCD: CPT | Performed by: INTERNAL MEDICINE

## 2025-02-18 NOTE — PROGRESS NOTES
Herman Mccarthy is a 84 y.o. 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   Herman Mccarthy was diagnosed with Diabetes mellitus in March 2019 when she was admitted to the hospital with polyuria polydipsia and weight loss..  At the time of diagnosis patient had polyuria polydipsia and weight loss aprox 15 lbs . She was admitted to hospital for DKA in march 2019 and that is when she was diagnosed with diabetes , her aic was 14.9 she was discharged home on basal bolus insulin.  Patient has been watching her diet closely and does not eat enough due to the fear that her blood glucose are good to go elevated.  At the same time she does tend to eat snacks between meals so that she does not have hypoglycemia.  She denies any hypoglycemia.  --On review of chart in November 2015 she had blood work done and her blood glucose was 88, in February 2017 she had a fasting glucose of 106, again in August 2018 she was noted to have a blood glucose of 106 on her Chem-7  In March 2019 when she was admitted to the hospital with a diabetic ketoacidosis her glucose was 665, sodium 125 and anion gap of 27 with a creatinine of 1.8.  She had an A1c of 14.9    --- Weight loss according to the  she has been slowly losing weight in the last 5 years approximately since 2014 when she weighed around 130 to 140 pounds --denied any significant change in appetite at that time.  On review of chart she had lost 16 to 17 pounds from November 2017 to March 2019  --In August 2013 she weighed 117 pounds as per office note so actual weight loss might be approximately 30 pounds since 2013 but it has been gradually.    She has hypertension and takes medication and at home blood pressure runs better  She has  Arthritis and is not on meds.  Patient was evaluated in June 2024 in the hospital after she had a fall , was diagnosed with  intracranial

## 2025-03-07 ENCOUNTER — TELEPHONE (OUTPATIENT)
Dept: ENDOCRINOLOGY | Age: 85
End: 2025-03-07

## 2025-03-24 ENCOUNTER — TELEPHONE (OUTPATIENT)
Dept: ENDOCRINOLOGY | Age: 85
End: 2025-03-24

## 2025-03-24 NOTE — TELEPHONE ENCOUNTER
Please advise patient glucose level looks fine on the home fingerstick glucose logs  Continue with the current regimen

## 2025-03-25 DIAGNOSIS — E10.65 UNCONTROLLED TYPE 1 DIABETES MELLITUS WITH HYPERGLYCEMIA (HCC): ICD-10-CM

## 2025-03-25 RX ORDER — PEN NEEDLE, DIABETIC 32GX 5/32"
NEEDLE, DISPOSABLE MISCELLANEOUS
Qty: 300 EACH | Refills: 1 | Status: SHIPPED | OUTPATIENT
Start: 2025-03-25

## 2025-03-25 NOTE — TELEPHONE ENCOUNTER
Medication:   Requested Prescriptions     Pending Prescriptions Disp Refills    Insulin Pen Needle (BD PEN NEEDLE MIHAELA 2ND GEN) 32G X 4 MM MISC [Pharmacy Med Name: BD MIHAELA 2 GEN PEN NDL 32G 4MM] 300 each 1     Sig: USE TO INJECT INSULIN 4 TIMES DAILY       Last Filled:      Patient Phone Number: [phone] (home)     Last appt: 2/18/2025   Next appt: 5/21/2025    Last Labs DM:   Lab Results   Component Value Date/Time    LABA1C 6.9 02/10/2025 09:27 AM

## 2025-04-01 ENCOUNTER — TELEPHONE (OUTPATIENT)
Dept: ENDOCRINOLOGY | Age: 85
End: 2025-04-01

## 2025-04-01 DIAGNOSIS — E10.65 UNCONTROLLED TYPE 1 DIABETES MELLITUS WITH HYPERGLYCEMIA (HCC): ICD-10-CM

## 2025-04-01 RX ORDER — METFORMIN HYDROCHLORIDE 500 MG/5ML
SOLUTION ORAL
Qty: 1350 ML | Refills: 3 | Status: SHIPPED | OUTPATIENT
Start: 2025-04-01

## 2025-04-01 NOTE — TELEPHONE ENCOUNTER
Pts  calling and states pt needs refill on her Metformin Liquid    Pharmacy info - [facility] [address] in [place]    CB# [phone]  for Pa

## 2025-04-01 NOTE — TELEPHONE ENCOUNTER
Requested Prescriptions     Signed Prescriptions Disp Refills    metFORMIN HCl 500 MG/5ML SOLN 1350 mL 3     Sig: TAKE 5ML BY MOUTH 3 TIMES A DAY     Authorizing Provider: VICKY URIBE     Ordering User: SIERRA FRANCISCO     .pref

## 2025-04-03 NOTE — TELEPHONE ENCOUNTER
Patient  called stating the insurance informed him the metformin liquid is no longer in their formulary. I advised  a PA has been started.     Please advise for an update on PA? Patient has about two days left of metformin.     Patient  stated patient said if patient has to take the pill form, she will stop taking it.

## 2025-04-04 NOTE — TELEPHONE ENCOUNTER
No update yet. I did refax it today.     If this requires a response please respond to the pool ( P [facility] MEDICATION PRE-AUTH).      Thank you please advise patient.

## 2025-04-08 NOTE — TELEPHONE ENCOUNTER
Please appeal denial. Patient is a diabetic who cannot swallow pills. She needs the liquid metformin.

## 2025-04-08 NOTE — TELEPHONE ENCOUNTER
Yes would like to appeal, patient is unable to take/swallow oral medications so would need a liquid form

## 2025-04-09 NOTE — TELEPHONE ENCOUNTER
That information was included in the original PA. They are denying the PA because they state the pt's diagnosis is not a medically-accepted indication.           Please advise. Thanks!    If this requires a response please respond to the pool ( P MHCX PSC MEDICATION PRE-AUTH).      Thank you please advise patient.

## 2025-04-10 ENCOUNTER — TELEPHONE (OUTPATIENT)
Dept: ENDOCRINOLOGY | Age: 85
End: 2025-04-10

## 2025-04-10 NOTE — TELEPHONE ENCOUNTER
Call from pt's spouse sergio requesting to speak with Dr. Villegas regarding the pt's liquid metformin   He stated that he is aware that insurance will not cover it. He is wanting to know if there is an alternative?    Please advise   CB# [phone]

## 2025-04-10 NOTE — TELEPHONE ENCOUNTER
Called patient's  and discussed that he did she does not need to take metformin anymore she can just manage her diabetes with insulin

## 2025-04-18 ENCOUNTER — TELEPHONE (OUTPATIENT)
Dept: ENDOCRINOLOGY | Age: 85
End: 2025-04-18

## 2025-04-18 RX ORDER — AMLODIPINE BESYLATE 5 MG/1
5 TABLET ORAL DAILY
Qty: 30 TABLET | Refills: 0 | Status: SHIPPED | OUTPATIENT
Start: 2025-04-18

## 2025-04-18 RX ORDER — LISINOPRIL 20 MG/1
20 TABLET ORAL DAILY
Qty: 30 TABLET | Refills: 0 | Status: SHIPPED | OUTPATIENT
Start: 2025-04-18

## 2025-05-01 NOTE — PROGRESS NOTES
Progress Note - Dr. Michaud - Internal Medicine  PCP: Enoch Varner MD [address] [address] [phone]    Hospital Day: 1  Code Status: Full Code  Current Diet: ADULT DIET; Regular; 4 carb choices (60 gm/meal)  ADULT ORAL NUTRITION SUPPLEMENT; Breakfast, Lunch, Dinner; Diabetic Oral Supplement        CC: follow up on medical issues    Subjective:   Herman Mccarthy is a 84 y.o. female.    Pt seen and examined  Chart reviewed since last visit, labs and imaging below      Doing ok   says much closer to baseline      Review of Systems: (1 system for EPF, 2-9 for detailed, 10+ for comprehensive)  Constitutional: Negative for chills and fever.     HENT: Negative for dental problem, nosebleeds and rhinorrhea.      Eyes: Negative for photophobia and visual disturbance.     Respiratory: Negative for cough, chest tightness and shortness of breath.      Cardiovascular: Negative for chest pain and leg swelling.     Gastrointestinal: Negative for diarrhea, nausea and vomiting.     Endocrine: Negative for polydipsia and polyphagia.     Genitourinary: Negative for frequency, hematuria and urgency.     Musculoskeletal: Negative for back pain and myalgias.     Skin: Negative for rash.     Allergic/Immunologic: Negative for food allergies.     Neurological: Negative for dizziness, seizures, syncope and facial asymmetry.     Hematological: Negative for adenopathy.     Psychiatric/Behavioral: Negative for dysphoric mood. The patient is not nervous/anxious.         I have reviewed the patient's medical and social history in detail and updated the computerized patient record.  To recap: She  has a past medical history of Diabetes mellitus (HCC) and Hypertension.. She  has a past surgical history that includes Cholecystectomy; Colonoscopy (N/A, 6/21/2021); Colonoscopy (6/21/2021); Colonoscopy (6/21/2021); colectomy (N/A, 8/25/2021); Upper gastrointestinal endoscopy (N/A, 5/17/2024); and Colonoscopy (N/A,  room. Has a \"code stroke\" or \"stroke alert\" been called?->Yes Reason for exam:->Acute onset confusion. Cannot follow commands. C/O right hand numbness Reason for Exam: Acute onset confusion. Cannot follow commands. C/O right hand numbness FINDINGS: BRAIN/VENTRICLES: There is no acute intracranial hemorrhage, mass effect or midline shift. No abnormal extra-axial fluid collection.  The gray-white differentiation is maintained without evidence of an acute infarct. There is prominence of the ventricles and sulci due to global parenchymal volume loss. There are nonspecific areas of hypoattenuation within the periventricular and subcortical white matter, which likely represent chronic microvascular ischemic change.  Tiny lacunar infarct involving the right caudate. ORBITS: The visualized portion of the orbits demonstrate no acute abnormality. SINUSES: The visualized paranasal sinuses and mastoid air cells demonstrate no acute abnormality. SOFT TISSUES/SKULL:  No acute abnormality of the visualized skull or soft tissues.     Age related findings in the brain and findings likely related to microvascular ischemic disease. No acute intracranial process identified.     CTA HEAD NECK W CONTRAST    Result Date: 1/9/2025  EXAMINATION: CTA OF THE HEAD AND NECK WITH CONTRAST 1/9/2025 10:25 am: TECHNIQUE: CTA of the head and neck was performed with the administration of intravenous contrast. Multiplanar reformatted images are provided for review.  MIP images are provided for review. Stenosis of the internal carotid arteries measured using NASCET criteria. Automated exposure control, iterative reconstruction, and/or weight based adjustment of the mA/kV was utilized to reduce the radiation dose to as low as reasonably achievable. This scan was analyzed using Viz.ai contact LVO. Identification of suspected findings is not for diagnostic use beyond notification. Viz LVO is limited to analysis of imaging data and should not be used  in-lieu of full patient evaluation or relied upon to make or confirm diagnosis. COMPARISON: None. HISTORY: ORDERING SYSTEM PROVIDED HISTORY: Acute onset confusion. Cannot follow commands. C/O right hand numbness FINDINGS: CTA NECK: AORTIC ARCH/ARCH VESSELS: No dissection or arterial injury.  No significant stenosis of the brachiocephalic or subclavian arteries. CAROTID ARTERIES: Dense fibrocalcific plaque along the left proximal cervical ICA.  Approximately 40 % stenosis per NASCET criteria.  30% stenosis of the left proximal cervical ICA due to fibrocalcific plaque. VERTEBRAL ARTERIES: No dissection, arterial injury, or significant stenosis. SOFT TISSUES: The lung apices are clear.  No cervical or superior mediastinal lymphadenopathy.  The larynx and pharynx are unremarkable.  No acute abnormality of the salivary and thyroid glands. BONES: No acute osseous abnormality. CTA HEAD: ANTERIOR CIRCULATION: No significant stenosis of the intracranial internal carotid, anterior cerebral, or middle cerebral arteries. No aneurysm. 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  Moderate luminal irregularity throughout the distal right PCA.  No significant stenosis of the basilar artery.  No aneurysm. OTHER: No dural venous sinus thrombosis on this non-dedicated study. BRAIN: No mass effect or midline shift. No extra-axial fluid collection. The gray-white differentiation is maintained.     1. 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 Moderate luminal irregularity throughout the distal right PCA. 2. 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 Approximately 40% stenosis per NASCET criteria. 30% stenosis of the left proximal cervical ICA due to fibrocalcific plaque.       Lab Results   Component Value Date/Time    GLUCOSE 135 01/10/2025 02:45 AM     Lab Results   Component Value Date/Time    POCGLU 154 01/10/2025  4

## 2025-05-06 DIAGNOSIS — E10.65 UNCONTROLLED TYPE 1 DIABETES MELLITUS WITH HYPERGLYCEMIA (HCC): ICD-10-CM

## 2025-05-06 RX ORDER — INSULIN GLARGINE 100 [IU]/ML
INJECTION, SOLUTION SUBCUTANEOUS
Qty: 15 ML | Refills: 0 | Status: SHIPPED | OUTPATIENT
Start: 2025-05-06

## 2025-05-06 NOTE — TELEPHONE ENCOUNTER
Medication:   Requested Prescriptions     Pending Prescriptions Disp Refills    insulin glargine (BASAGLAR KWIKPEN) 100 UNIT/ML injection pen 15 mL 0     Si units daily.       Last Filled:      Patient Phone Number: [phone] (home)     Last appt: 2025   Next appt: 2025    Last Labs DM:   Lab Results   Component Value Date/Time    LABA1C 6.9 02/10/2025 09:27 AM

## 2025-05-06 NOTE — TELEPHONE ENCOUNTER
Refill is needed    insulin glargine (BASAGLAR KWIKPEN) 100 UNIT/ML injection pen [[id number]]      [id number] - [place] - [address] - P [phone] - F [phone]  [address] [place] [address]  Phone: [phone]  Fax: [phone]

## 2025-05-16 ENCOUNTER — HOSPITAL ENCOUNTER (OUTPATIENT)
Age: 85
Discharge: HOME | End: 2025-05-16
Payer: MEDICARE

## 2025-05-16 DIAGNOSIS — E13.9 DIABETES MELLITUS TYPE 1.5, MANAGED AS TYPE 2 (HCC): ICD-10-CM

## 2025-05-16 DIAGNOSIS — I10 ESSENTIAL HYPERTENSION: ICD-10-CM

## 2025-05-16 DIAGNOSIS — I60.9 SUBARACHNOID HEMORRHAGE (HCC): ICD-10-CM

## 2025-05-16 LAB
ALBUMIN SERPL-MCNC: 4.4 G/DL (ref 3.4–5)
ALBUMIN/GLOB SERPL: 1.5 {RATIO} (ref 1.1–2.2)
ALP SERPL-CCNC: 93 U/L (ref 40–129)
ALT SERPL-CCNC: 7 U/L (ref 10–40)
ANION GAP SERPL CALCULATED.3IONS-SCNC: 11 MMOL/L (ref 3–16)
AST SERPL-CCNC: 15 U/L (ref 15–37)
BILIRUB SERPL-MCNC: 0.9 MG/DL (ref 0–1)
BUN SERPL-MCNC: 18 MG/DL (ref 7–20)
CALCIUM SERPL-MCNC: 9.6 MG/DL (ref 8.3–10.6)
CHLORIDE SERPL-SCNC: 100 MMOL/L (ref 99–110)
CHOLEST SERPL-MCNC: 146 MG/DL (ref 0–199)
CO2 SERPL-SCNC: 25 MMOL/L (ref 21–32)
CREAT SERPL-MCNC: 1.1 MG/DL (ref 0.6–1.2)
EST. AVERAGE GLUCOSE BLD GHB EST-MCNC: 157.1 MG/DL
GFR SERPLBLD CREATININE-BSD FMLA CKD-EPI: 49 ML/MIN/{1.73_M2}
GLUCOSE SERPL-MCNC: 178 MG/DL (ref 70–99)
HBA1C MFR BLD: 7.1 %
HDLC SERPL-MCNC: 56 MG/DL (ref 40–60)
LDLC SERPL CALC-MCNC: 78 MG/DL
POTASSIUM SERPL-SCNC: 4.4 MMOL/L (ref 3.5–5.1)
PROT SERPL-MCNC: 7.3 G/DL (ref 6.4–8.2)
SODIUM SERPL-SCNC: 136 MMOL/L (ref 136–145)
TRIGL SERPL-MCNC: 58 MG/DL (ref 0–150)
TSH SERPL DL<=0.005 MIU/L-ACNC: 2.8 UIU/ML (ref 0.27–4.2)
VLDLC SERPL CALC-MCNC: 12 MG/DL

## 2025-05-16 PROCEDURE — 83036 HEMOGLOBIN GLYCOSYLATED A1C: CPT

## 2025-05-16 PROCEDURE — 80061 LIPID PANEL: CPT

## 2025-05-16 PROCEDURE — 80053 COMPREHEN METABOLIC PANEL: CPT

## 2025-05-16 PROCEDURE — 84443 ASSAY THYROID STIM HORMONE: CPT

## 2025-05-16 PROCEDURE — 36415 COLL VENOUS BLD VENIPUNCTURE: CPT

## 2025-05-21 ENCOUNTER — OFFICE VISIT (OUTPATIENT)
Dept: ENDOCRINOLOGY | Age: 85
End: 2025-05-21
Payer: MEDICARE

## 2025-05-21 VITALS
SYSTOLIC BLOOD PRESSURE: 140 MMHG | OXYGEN SATURATION: 98 % | HEIGHT: 62 IN | RESPIRATION RATE: 16 BRPM | TEMPERATURE: 98 F | HEART RATE: 62 BPM | DIASTOLIC BLOOD PRESSURE: 73 MMHG | WEIGHT: 95.6 LBS | BODY MASS INDEX: 17.59 KG/M2

## 2025-05-21 DIAGNOSIS — E10.65 TYPE 1 DIABETES MELLITUS WITH HYPERGLYCEMIA (HCC): Primary | ICD-10-CM

## 2025-05-21 DIAGNOSIS — D69.6 THROMBOCYTOPENIA, UNSPECIFIED: ICD-10-CM

## 2025-05-21 DIAGNOSIS — I10 ESSENTIAL HYPERTENSION: ICD-10-CM

## 2025-05-21 PROCEDURE — 1159F MED LIST DOCD IN RCRD: CPT | Performed by: INTERNAL MEDICINE

## 2025-05-21 PROCEDURE — G2211 COMPLEX E/M VISIT ADD ON: HCPCS | Performed by: INTERNAL MEDICINE

## 2025-05-21 PROCEDURE — 99214 OFFICE O/P EST MOD 30 MIN: CPT | Performed by: INTERNAL MEDICINE

## 2025-05-21 PROCEDURE — 3078F DIAST BP <80 MM HG: CPT | Performed by: INTERNAL MEDICINE

## 2025-05-21 PROCEDURE — 3051F HG A1C>EQUAL 7.0%<8.0%: CPT | Performed by: INTERNAL MEDICINE

## 2025-05-21 PROCEDURE — 1123F ACP DISCUSS/DSCN MKR DOCD: CPT | Performed by: INTERNAL MEDICINE

## 2025-05-21 PROCEDURE — 1160F RVW MEDS BY RX/DR IN RCRD: CPT | Performed by: INTERNAL MEDICINE

## 2025-05-21 PROCEDURE — 3077F SYST BP >= 140 MM HG: CPT | Performed by: INTERNAL MEDICINE

## 2025-06-10 ENCOUNTER — TELEPHONE (OUTPATIENT)
Dept: ENDOCRINOLOGY | Age: 85
End: 2025-06-10

## 2025-06-13 ENCOUNTER — TELEPHONE (OUTPATIENT)
Dept: ENDOCRINOLOGY | Age: 85
End: 2025-06-13

## 2025-06-13 NOTE — TELEPHONE ENCOUNTER
Patient is having cataract surgery coming up she asking is any of the medication she taking here going to interfere with that please advise

## 2025-06-16 NOTE — TELEPHONE ENCOUNTER
Please find out when is this surgery, as she will be n.p.o. for the surgery so I would recommend reducing the long-acting insulin by 1 to 2 units the day before surgery and not to take any short acting insulin the day before surgery if she is kept n.p.o.

## 2025-06-17 NOTE — TELEPHONE ENCOUNTER
Patients  aware. She has the left eye surgery on July 14th and then the right eye surgery on July 28th.

## 2025-06-17 NOTE — TELEPHONE ENCOUNTER
Patient  called back and states that she will be put under local anesthesia  should she not take the short insulin that day he was confused please call

## 2025-07-11 ENCOUNTER — TELEPHONE (OUTPATIENT)
Dept: ENDOCRINOLOGY | Age: 85
End: 2025-07-11

## 2025-08-05 ENCOUNTER — TELEPHONE (OUTPATIENT)
Dept: ENDOCRINOLOGY | Age: 85
End: 2025-08-05

## 2025-08-08 ENCOUNTER — TELEPHONE (OUTPATIENT)
Dept: ENDOCRINOLOGY | Age: 85
End: 2025-08-08

## 2025-08-18 PROBLEM — Z86.73 HISTORY OF STROKE: Status: ACTIVE | Noted: 2024-06-18

## 2025-09-02 ENCOUNTER — TELEPHONE (OUTPATIENT)
Dept: ENDOCRINOLOGY | Age: 85
End: 2025-09-02

## 2025-09-02 DIAGNOSIS — E10.65 TYPE 1 DIABETES MELLITUS WITH HYPERGLYCEMIA (HCC): Primary | ICD-10-CM

## 2025-09-02 RX ORDER — BLOOD-GLUCOSE METER
EACH MISCELLANEOUS
Qty: 150 EACH | Refills: 1 | Status: SHIPPED | OUTPATIENT
Start: 2025-09-02

## (undated) DEVICE — MERCY FAIRFIELD TURNOVER KIT: Brand: MEDLINE INDUSTRIES, INC.

## (undated) DEVICE — SOLUTION IRRIG 1000ML 0.9% SOD CHL USP POUR PLAS BTL

## (undated) DEVICE — COVER LT HNDL BLU PLAS

## (undated) DEVICE — PLUMEPORT LAPAROSCOPIC SMOKE FILTRATION DEVICE: Brand: PLUMEPORT ACTIV

## (undated) DEVICE — DRAPE THER FLUID WARMING 66X44 IN FLAT SLUSH DBL DISC ORS

## (undated) DEVICE — SOLUTION IV IRRIG WATER 500ML POUR BRL ST 2F7113

## (undated) DEVICE — STAPLER INT L34CM 60MM LNG ENDOSCP ARTC PWR + ECHELON FLX

## (undated) DEVICE — Device

## (undated) DEVICE — GLOVE SURG SZ 6.5 L11.2IN FNGR THK9.8MIL STRW LTX POLYMER

## (undated) DEVICE — SUTURE VCRL + SZ 0 L27IN CT 3 ABSRB VCP329H

## (undated) DEVICE — SET VLV 3 PC AWS DISPOSABLE GRDIAN SCOPEVALET

## (undated) DEVICE — SEALER LAP L37CM MARYLAND JAW OPN NANO COAT MULTIFUNCTIONAL

## (undated) DEVICE — CONTAINER,SPECIMEN,OR STERILE,4OZ: Brand: MEDLINE

## (undated) DEVICE — 30977 SEE SHARP - ENHANCED INTRAOPERATIVE LAPAROSCOPE CLEANING & DEFOGGING: Brand: 30977 SEE SHARP - ENHANCED INTRAOPERATIVE LAPAROSCOPE CLEANING & DEFOGGING

## (undated) DEVICE — PROCEDURE KIT ENDOSCP CUST

## (undated) DEVICE — BLANKET WRM W29.9XL79.1IN UP BODY FORC AIR MISTRAL-AIR

## (undated) DEVICE — SPONGE LAP W18XL18IN WHT COT 4 PLY FLD STRUNG RADPQ DISP ST

## (undated) DEVICE — SINGLE USE AIR/WATER, SUCTION AND BIOPSY VALVES SET: Brand: ORCAPOD™

## (undated) DEVICE — HYPODERMIC SAFETY NEEDLE: Brand: MAGELLAN

## (undated) DEVICE — MOUTHPIECE ENDOSCP L CTRL OPN AND SIDE PORTS DISP

## (undated) DEVICE — BW-412T DISP COMBO CLEANING BRUSH: Brand: SINGLE USE COMBINATION CLEANING BRUSH

## (undated) DEVICE — SYRINGE, LUER LOCK, 10ML: Brand: MEDLINE

## (undated) DEVICE — SUTURE PDS II SZ 0 L60IN ABSRB VLT L48MM CTX 1/2 CIR Z990G

## (undated) DEVICE — SUTURE ABSORBABLE MONOFILAMENT 0 CTX 60 IN VIO PDS + PDP990G

## (undated) DEVICE — TROCAR SLEEVE: Brand: KII ® LOW PROFILE SLEEVE

## (undated) DEVICE — FORCEPS BX L240CM WRK CHN 2.8MM STD CAP W/ NDL MIC MESH

## (undated) DEVICE — [HIGH FLOW INSUFFLATOR,  DO NOT USE IF PACKAGE IS DAMAGED,  KEEP DRY,  KEEP AWAY FROM SUNLIGHT,  PROTECT FROM HEAT AND RADIOACTIVE SOURCES.]: Brand: PNEUMOSURE

## (undated) DEVICE — RELOAD STPL L60MM H1.5-3.6MM REG TISS BLU GRIPPING SURF B

## (undated) DEVICE — GOWN SIRUS NONREIN LG W/TWL: Brand: MEDLINE INDUSTRIES, INC.

## (undated) DEVICE — ADHESIVE SKIN CLSR 0.7ML TOP DERMBND ADV

## (undated) DEVICE — CORD ES L10FT MPLR LAP

## (undated) DEVICE — SUTURE MCRYL + SZ 4-0 L27IN ABSRB UD L19MM PS-2 3/8 CIR MCP426H

## (undated) DEVICE — BLADE ES L6IN ELASTOMERIC COAT INSUL DURABLE BEND UPTO

## (undated) DEVICE — Device: Brand: SPOT EX ENDOSCOPIC TATTOO

## (undated) DEVICE — DRAPE,LAP,CHOLE,W/TROUGHS,STERILE: Brand: MEDLINE

## (undated) DEVICE — SUTURE PERMAHAND SZ 3-0 L18IN NONABSORBABLE BLK L26MM SH C013D

## (undated) DEVICE — TOTAL TRAY, DB, 100% SILI FOLEY, 16FR 10: Brand: MEDLINE

## (undated) DEVICE — 3M™ IOBAN™ 2 ANTIMICROBIAL INCISE DRAPE 6650EZ: Brand: IOBAN™ 2

## (undated) DEVICE — TRAP SPEC RETRV CLR PLAS POLYP IN LN SUCT QUIK CTCH

## (undated) DEVICE — NEEDLE 25GAX5.0MM INJ CARR LOCKE

## (undated) DEVICE — LAPAROSCOPIC ACCESS SYSTEM: Brand: ALEXIS LAPAROSCOPIC SYSTEM WITH KII FIOS FIRST ENTRY

## (undated) DEVICE — 3M™ STERI-STRIP™ REINFORCED ADHESIVE SKIN CLOSURES, R1547, 1/2 IN X 4 IN (12 MM X 100 MM), 6 STRIPS/ENVELOPE: Brand: 3M™ STERI-STRIP™

## (undated) DEVICE — MAJOR SET UP PK

## (undated) DEVICE — AIR/WATER CLEANING ADAPTER FOR OLYMPUS® GI ENDOSCOPE: Brand: BULLDOG®

## (undated) DEVICE — APPLICATOR MEDICATED 26 CC SOLUTION HI LT ORNG CHLORAPREP

## (undated) DEVICE — LEGGINGS, PAIR, 31X48, STERILE: Brand: MEDLINE

## (undated) DEVICE — SUTURE MCRYL SZ 4-0 L27IN ABSRB UD L19MM PS-2 1/2 CIR PRIM Y426H

## (undated) DEVICE — LAPAROSCOPIC TROCAR SLEEVE/SINGLE USE: Brand: KII® LOW PROFILE OPTICAL ACCESS SYSTEM

## (undated) DEVICE — SHEET,DRAPE,40X58,STERILE: Brand: MEDLINE

## (undated) DEVICE — ENDOSCOPIC KIT 6X3/16 FT COLON W/ 1.1 OZ 2 GWN W/O BRSH